# Patient Record
Sex: MALE | Race: WHITE | NOT HISPANIC OR LATINO | Employment: FULL TIME | URBAN - METROPOLITAN AREA
[De-identification: names, ages, dates, MRNs, and addresses within clinical notes are randomized per-mention and may not be internally consistent; named-entity substitution may affect disease eponyms.]

---

## 2017-01-23 RX ORDER — PRAVASTATIN SODIUM 80 MG/1
TABLET ORAL
Qty: 90 TABLET | Refills: 0 | Status: SHIPPED | OUTPATIENT
Start: 2017-01-23 | End: 2017-06-02 | Stop reason: SDUPTHER

## 2017-01-23 RX ORDER — PRAVASTATIN SODIUM 80 MG/1
80 TABLET ORAL DAILY
Qty: 90 TABLET | Refills: 0 | Status: SHIPPED | OUTPATIENT
Start: 2017-01-23 | End: 2017-01-23 | Stop reason: SDUPTHER

## 2017-01-23 RX ORDER — PRAVASTATIN SODIUM 80 MG/1
80 TABLET ORAL DAILY
Qty: 90 TABLET | Refills: 0 | Status: SHIPPED | OUTPATIENT
Start: 2017-01-23 | End: 2018-06-07 | Stop reason: SDUPTHER

## 2017-04-10 ENCOUNTER — PATIENT MESSAGE (OUTPATIENT)
Dept: FAMILY MEDICINE | Facility: CLINIC | Age: 62
End: 2017-04-10

## 2017-04-11 ENCOUNTER — PATIENT MESSAGE (OUTPATIENT)
Dept: FAMILY MEDICINE | Facility: CLINIC | Age: 62
End: 2017-04-11

## 2017-04-11 DIAGNOSIS — R07.9 CHEST PAIN, UNSPECIFIED TYPE: Primary | ICD-10-CM

## 2017-04-11 DIAGNOSIS — R53.1 GENERALIZED WEAKNESS: ICD-10-CM

## 2017-04-11 DIAGNOSIS — R42 DIZZINESS: ICD-10-CM

## 2017-04-11 NOTE — TELEPHONE ENCOUNTER
----- Message from German Bull sent at 4/11/2017 11:10 AM CDT -----  Contact: Self  Pt called regarding [facility] messages. Pt states he needs a response ASAP. Pt can be reached @  [phone]

## 2017-04-11 NOTE — TELEPHONE ENCOUNTER
Discussed at length on the phone with patient.  He does admit to taking 1 puff of marijuana but no one else smoking it had similar symptoms and he got it from a reputable friend.  Is not something he typically does.  He felt profoundly weak for about 6 hours.  Apparently the workup in the emergency room was negative.  The did do a urine.  He was not told there was anything unusual found.  No particular explanation could be given.  His description sounds very much like acute vertigo.  All this happened recently any exercise yesterday without any problems and exercise today without problems.  He was not told his blood pressure was low in the emergency room.  He was generally profoundly weak.    We discussed that this is likely an episode of vertigo and could be recurrent    The also reviewed his family history of heart disease, his low HDL, prior abnormal testing and he did have an angiogram that was normal but that was 2003.  We will repeat his cardiac testing under the circumstances and have him come in for a physical.

## 2017-04-24 ENCOUNTER — HOSPITAL ENCOUNTER (OUTPATIENT)
Dept: RADIOLOGY | Facility: HOSPITAL | Age: 62
Discharge: HOME | End: 2017-04-24
Attending: INTERNAL MEDICINE
Payer: COMMERCIAL

## 2017-04-24 ENCOUNTER — PATIENT MESSAGE (OUTPATIENT)
Dept: FAMILY MEDICINE | Facility: CLINIC | Age: 62
End: 2017-04-24

## 2017-04-24 ENCOUNTER — HOSPITAL ENCOUNTER (OUTPATIENT)
Dept: CARDIOLOGY | Facility: HOSPITAL | Age: 62
Discharge: HOME | End: 2017-04-24
Attending: INTERNAL MEDICINE
Payer: COMMERCIAL

## 2017-04-24 DIAGNOSIS — R42 DIZZINESS: ICD-10-CM

## 2017-04-24 DIAGNOSIS — R53.1 GENERALIZED WEAKNESS: ICD-10-CM

## 2017-04-24 DIAGNOSIS — R07.9 CHEST PAIN, UNSPECIFIED TYPE: ICD-10-CM

## 2017-04-24 LAB — DIASTOLIC DYSFUNCTION: NO

## 2017-04-24 PROCEDURE — 78452 HT MUSCLE IMAGE SPECT MULT: CPT | Mod: TC

## 2017-04-24 PROCEDURE — 93017 CV STRESS TEST TRACING ONLY: CPT

## 2017-04-24 PROCEDURE — 78452 HT MUSCLE IMAGE SPECT MULT: CPT | Mod: 26,,, | Performed by: INTERNAL MEDICINE

## 2017-04-24 PROCEDURE — 93016 CV STRESS TEST SUPVJ ONLY: CPT | Mod: ,,, | Performed by: INTERNAL MEDICINE

## 2017-04-24 PROCEDURE — 93018 CV STRESS TEST I&R ONLY: CPT | Mod: ,,, | Performed by: INTERNAL MEDICINE

## 2017-06-02 ENCOUNTER — OFFICE VISIT (OUTPATIENT)
Dept: FAMILY MEDICINE | Facility: CLINIC | Age: 62
End: 2017-06-02
Payer: COMMERCIAL

## 2017-06-02 VITALS
TEMPERATURE: 98 F | BODY MASS INDEX: 25.44 KG/M2 | DIASTOLIC BLOOD PRESSURE: 78 MMHG | SYSTOLIC BLOOD PRESSURE: 130 MMHG | HEART RATE: 89 BPM | OXYGEN SATURATION: 95 % | WEIGHT: 177.69 LBS | HEIGHT: 70 IN

## 2017-06-02 DIAGNOSIS — R09.82 POST-NASAL DRIP: Primary | ICD-10-CM

## 2017-06-02 PROCEDURE — 99213 OFFICE O/P EST LOW 20 MIN: CPT | Mod: S$GLB,,, | Performed by: NURSE PRACTITIONER

## 2017-06-02 PROCEDURE — 99999 PR PBB SHADOW E&M-EST. PATIENT-LVL III: CPT | Mod: PBBFAC,,, | Performed by: NURSE PRACTITIONER

## 2017-06-02 NOTE — PATIENT INSTRUCTIONS
Claritin(loratadine), Allegra(fexofenadine), or zyrtec(cetirizine) for post nasal drip, runny nose, and congestion.   Delsym for cough.

## 2017-06-02 NOTE — PROGRESS NOTES
This dictation has been generated using Dragon Dictation some phonetic errors may occur.     Dariusz was seen today for sinus problem and cough.    Diagnoses and all orders for this visit:    Post-nasal drip      Patient Instructions   Claritin(loratadine), Allegra(fexofenadine), or zyrtec(cetirizine) for post nasal drip, runny nose, and congestion.   Delsym for cough.       Return if symptoms worsen or fail to improve.      ________________________________________________________________  ________________________________________________________________        Chief Complaint   Patient presents with    Sinus Problem    Cough     History of present illness  This 61 y.o. presents today for complaint of postnasal drip and cough.  Cough isn't present for a couple weeks.  Chest is become somewhat uncomfortable.  Postnasal drip is worse at night.  Recently had cardiac workup which was unremarkable.  Does 30 minutes on the elliptical.  He tried Delsym for the cough which was helpful.  Review of systems  No fever or chills  No facial pain or pressure  Some tightness in the chest no shortness of breath no dyspnea on exertion  No nausea vomiting.  Patient did have an episode of diarrhea.  Past medical and social history reviewed.  Patient due to me.  Follows with normal partners.    Past Medical History:   Diagnosis Date    Decreased libido 11/11/2013    Dermatitis 10/12/2012    Hyperlipidemia 11/11/2013       History reviewed. No pertinent surgical history.    History reviewed. No pertinent family history.    Social History     Social History    Marital status:      Spouse name: N/A    Number of children: N/A    Years of education: N/A     Social History Main Topics    Smoking status: Never Smoker    Smokeless tobacco: None    Alcohol use None    Drug use: Unknown    Sexual activity: Not Asked     Other Topics Concern    None     Social History Narrative    None       Current Outpatient Prescriptions    Medication Sig Dispense Refill    aspirin 325 MG tablet Take 325 mg by mouth.  Tablet Oral Every day      pravastatin (PRAVACHOL) 80 MG tablet Take 1 tablet (80 mg total) by mouth once daily. 90 tablet 0     No current facility-administered medications for this visit.        Review of patient's allergies indicates:  No Known Allergies    Physical examination  Vitals Reviewed  Gen. Well-dressed well-nourished no apparent distress  Skin warm dry and intact.  No rashes noted.  HEENT.  TM intact bilateral with normal light reflex.  No mastoid tenderness during percussion.  Nares patent bilateral.  Pharynx is unremarkable except postnasal drip.  No maxillary or frontal sinus tenderness when percussed.    Neck is supple without adenopathy  Chest.  Respirations are even unlabored.  Lungs are clear to auscultation.  Cardiac regular rate and rhythm.  No chest wall adenopathy noted.  Neuro. Awake alert oriented x4.  Normal judgment and cognition noted.  Extremities no clubbing cyanosis or edema noted.     Call or return to clinic prn if these symptoms worsen or fail to improve as anticipated.

## 2017-06-10 ENCOUNTER — PATIENT MESSAGE (OUTPATIENT)
Dept: FAMILY MEDICINE | Facility: CLINIC | Age: 62
End: 2017-06-10

## 2017-06-10 ENCOUNTER — OFFICE VISIT (OUTPATIENT)
Dept: FAMILY MEDICINE | Facility: CLINIC | Age: 62
End: 2017-06-10
Payer: COMMERCIAL

## 2017-06-10 ENCOUNTER — HOSPITAL ENCOUNTER (OUTPATIENT)
Dept: RADIOLOGY | Facility: HOSPITAL | Age: 62
Discharge: HOME | End: 2017-06-10
Attending: FAMILY MEDICINE
Payer: COMMERCIAL

## 2017-06-10 VITALS
RESPIRATION RATE: 18 BRPM | OXYGEN SATURATION: 94 % | HEIGHT: 70 IN | SYSTOLIC BLOOD PRESSURE: 120 MMHG | DIASTOLIC BLOOD PRESSURE: 70 MMHG | BODY MASS INDEX: 24.79 KG/M2 | HEART RATE: 107 BPM | WEIGHT: 173.19 LBS | TEMPERATURE: 98 F

## 2017-06-10 DIAGNOSIS — J18.9 ATYPICAL PNEUMONIA: ICD-10-CM

## 2017-06-10 DIAGNOSIS — R93.89 ABNORMAL CXR: Primary | ICD-10-CM

## 2017-06-10 DIAGNOSIS — J18.9 ATYPICAL PNEUMONIA: Primary | ICD-10-CM

## 2017-06-10 DIAGNOSIS — R06.00 DYSPNEA, UNSPECIFIED TYPE: ICD-10-CM

## 2017-06-10 PROCEDURE — 71020 XR CHEST PA AND LATERAL: CPT | Mod: 26,,, | Performed by: RADIOLOGY

## 2017-06-10 PROCEDURE — 99999 PR PBB SHADOW E&M-EST. PATIENT-LVL III: CPT | Mod: PBBFAC,,,

## 2017-06-10 PROCEDURE — 71020 XR CHEST PA AND LATERAL: CPT | Mod: TC,PO

## 2017-06-10 PROCEDURE — 99214 OFFICE O/P EST MOD 30 MIN: CPT | Mod: S$GLB,,, | Performed by: FAMILY MEDICINE

## 2017-06-10 RX ORDER — ASPIRIN 81 MG/1
81 TABLET ORAL EVERY MORNING
COMMUNITY
End: 2017-09-15

## 2017-06-10 RX ORDER — AZITHROMYCIN 250 MG/1
TABLET, FILM COATED ORAL
Qty: 6 TABLET | Refills: 0 | Status: SHIPPED | OUTPATIENT
Start: 2017-06-10 | End: 2017-06-14 | Stop reason: ALTCHOICE

## 2017-06-10 NOTE — PROGRESS NOTES
Routine Office Visit    Patient Name: Dariusz Urbina    : 1955  MRN: [id number]    Subjective:  Dariusz is a 62 y.o. male who presents today for:    1. Fatigue, cough, and shortness of breath  Patient presenting with 2 weeks of feeling fatigued, productive cough, and shortness of breath on exertion.  He states that he had a temperature of 101F today.  There has been some congestion.  No chills, sweats, n/v/d.  No abdominal pain.  He states that there is pain with deep inspiration.      Past Medical History  Past Medical History:   Diagnosis Date    Decreased libido 2013    Dermatitis 10/12/2012    Hyperlipidemia 2013       Past Surgical History  History reviewed. No pertinent surgical history.    Family History  History reviewed. No pertinent family history.    Social History  Social History     Social History    Marital status:      Spouse name: N/A    Number of children: N/A    Years of education: N/A     Occupational History    Not on file.     Social History Main Topics    Smoking status: Former Smoker    Smokeless tobacco: Not on file    Alcohol use Not on file    Drug use: Unknown    Sexual activity: Not on file     Other Topics Concern    Not on file     Social History Narrative    No narrative on file       Current Medications  Current Outpatient Prescriptions on File Prior to Visit   Medication Sig Dispense Refill    pravastatin (PRAVACHOL) 80 MG tablet Take 1 tablet (80 mg total) by mouth once daily. 90 tablet 0    [DISCONTINUED] aspirin 325 MG tablet Take 325 mg by mouth.  Tablet Oral Every day       No current facility-administered medications on file prior to visit.        Allergies   Review of patient's allergies indicates:  No Known Allergies    Review of Systems (Pertinent positives)  Review of Systems   Constitutional: Positive for fever and malaise/fatigue.   HENT: Positive for congestion.    Eyes: Negative.    Respiratory: Positive for cough, sputum production  "and shortness of breath. Negative for hemoptysis and wheezing.    Cardiovascular: Negative.    Gastrointestinal: Negative.    Skin: Negative.          /70 (BP Location: Right arm, Patient Position: Sitting, BP Method: Manual)   Pulse 107   Temp 98.4 °F (36.9 °C) (Oral) Comment: ibuprofen 2 tablets about 25 minutes ago  Resp 18   Ht 5' 10" (1.778 m)   Wt 78.5 kg (173 lb 2.7 oz)   SpO2 (!) 94%   BMI 24.85 kg/m²     GENERAL APPEARANCE: in no apparent distress and well developed and well nourished  HEENT: PERRL, EOMI, Sclera clear, anicteric, Oropharynx clear, no lesions, Neck supple with midline trachea  NECK: normal, supple, no adenopathy, thyroid normal in size  RESPIRATORY: appears well, vitals normal, no respiratory distress, acyanotic, normal RR, chest clear, no wheezing, crepitations, rhonchi, normal symmetric air entry  HEART: regular rate and rhythm, S1, S2 normal, no murmur, click, rub or gallop.    ABDOMEN: abdomen is soft without tenderness, no masses, no hernias, no organomegaly, no rebound, no guarding. Suprapubic tenderness absent. No CVA tenderness.  SKIN: no rashes, no wounds, no other lesions  PSYCH: Alert, oriented x 3, thought content appropriate, speech normal, pleasant and cooperative, good eye contact, well groomed    Assessment/Plan:  Dariusz Urbina is a 62 y.o. male who presents today for :    Dariusz was seen today for cough and fever.    Diagnoses and all orders for this visit:    Atypical pneumonia  -     azithromycin (Z-LUIS) 250 MG tablet; Take 2 tablets by mouth on day 1; Take 1 tablet by mouth on days 2-5  -     X-Ray Chest PA And Lateral; Future    1.  cxr to be done today  2.  zpak as prescribed  3.  He is to call if no improvement by Monday and will get blood work  4.  Follow up as needed    Carlos Jacinto MD      "

## 2017-06-12 ENCOUNTER — HOSPITAL ENCOUNTER (OUTPATIENT)
Dept: RADIOLOGY | Facility: HOSPITAL | Age: 62
Discharge: HOME | End: 2017-06-12
Attending: FAMILY MEDICINE
Payer: COMMERCIAL

## 2017-06-12 DIAGNOSIS — R91.8 ABNORMAL CT SCAN, LUNG: ICD-10-CM

## 2017-06-12 DIAGNOSIS — R93.89 ABNORMAL CXR: ICD-10-CM

## 2017-06-12 DIAGNOSIS — R91.8 PULMONARY NODULES/LESIONS, MULTIPLE: Primary | ICD-10-CM

## 2017-06-12 DIAGNOSIS — R06.00 DYSPNEA, UNSPECIFIED TYPE: ICD-10-CM

## 2017-06-12 PROCEDURE — 71250 CT THORAX DX C-: CPT | Mod: TC

## 2017-06-12 PROCEDURE — 71250 CT THORAX DX C-: CPT | Mod: 26,,, | Performed by: RADIOLOGY

## 2017-06-12 NOTE — PROGRESS NOTES
Patient called and notified of CT results [facility] 6/12/17 at 1745.  Patient informed that abnormal chest CT was concerning for metastatic disease.  He stated that he continues to have fevers and sweats.  He does occasionally get short of breath with exertion, but not at rest.  No night sweats, bruising, or changes in bowel habits.  He is up to date on colonoscopies and reports they have always been normal.  No testicular pain.  Will place referral to pulmonary for evaluation.  Patient told to go to the ED should his current symptoms worsen.    Carlos Jacinto MD

## 2017-06-13 ENCOUNTER — PATIENT MESSAGE (OUTPATIENT)
Dept: FAMILY MEDICINE | Facility: CLINIC | Age: 62
End: 2017-06-13

## 2017-06-13 ENCOUNTER — TELEPHONE (OUTPATIENT)
Dept: FAMILY MEDICINE | Facility: CLINIC | Age: 62
End: 2017-06-13

## 2017-06-13 NOTE — TELEPHONE ENCOUNTER
----- Message from Mari Mohamud sent at 6/13/2017  7:57 AM CDT -----  Patient is requesting a return call regarding a pulmonary referral. Please call at [phone] Thank you!

## 2017-06-13 NOTE — TELEPHONE ENCOUNTER
Patient already has a referral in for pulmonary so please call the referral coordinators and have them work on that ASAP, ANY available pulmonary    If necessary, ask the referral coordinators to call the pulmonary department and have them review the recent CTs of that they can triage an appropriate appointment since he does need to be seen soon

## 2017-06-14 ENCOUNTER — OFFICE VISIT (OUTPATIENT)
Dept: PULMONOLOGY | Facility: CLINIC | Age: 62
End: 2017-06-14
Payer: COMMERCIAL

## 2017-06-14 VITALS
OXYGEN SATURATION: 96 % | DIASTOLIC BLOOD PRESSURE: 82 MMHG | HEIGHT: 70 IN | SYSTOLIC BLOOD PRESSURE: 142 MMHG | HEART RATE: 95 BPM | WEIGHT: 172.5 LBS | BODY MASS INDEX: 24.69 KG/M2

## 2017-06-14 DIAGNOSIS — R91.8 LUNG MASS: Primary | ICD-10-CM

## 2017-06-14 PROCEDURE — 99204 OFFICE O/P NEW MOD 45 MIN: CPT | Mod: S$GLB,,, | Performed by: INTERNAL MEDICINE

## 2017-06-14 PROCEDURE — 99999 PR PBB SHADOW E&M-EST. PATIENT-LVL III: CPT | Mod: PBBFAC,,, | Performed by: INTERNAL MEDICINE

## 2017-06-14 NOTE — PROGRESS NOTES
Subjective:       Patient ID: Dariusz Urbina is a 62 y.o. male.    Chief Complaint: Abnormal Ct Scan    HPI   Dariusz Urbina 62 y.o. male    has a past medical history of Decreased libido (2013); Dermatitis (10/12/2012); and Hyperlipidemia (2013).    has no past surgical history on file.   reports that he has quit smoking. He does not have any smokeless tobacco history on file.  Referred by: Dr. Carlos Jacinto  Who had concerns including Abnormal Ct Scan.  The patient's last visit with me was on Visit date not found.    No chills, ns, wt changes, nausea, vomiting, diarrhea, constipation, chest pain, tightness, pressure  Doing well until 3 weeks ago, developed low grade fever, malaise, fatigue  Thought to be allergies, took mucinex  Returned to pcp  Cxr done with multiple masses  Ct done and referred here  Very distant tobacco history  Sister  20 years ago of lung cancer  Cardiac ct 1.5 years  Ago  Negative except 4mm lll   No rashes, nodes,     Review of Systems   All other systems reviewed and are negative.      Objective:      Physical Exam   Constitutional: He is oriented to person, place, and time. He appears well-developed and well-nourished. He appears not cachectic. No distress.   HENT:   Head: Normocephalic.   Nose: Nose normal. No mucosal edema.   Mouth/Throat: Oropharynx is clear and moist. Normal dentition. No oropharyngeal exudate.   Neck: Normal range of motion. Neck supple.   Cardiovascular: Normal rate, regular rhythm, normal heart sounds and intact distal pulses.  Exam reveals no gallop and no friction rub.    No murmur heard.  Pulmonary/Chest: Effort normal and breath sounds normal. No stridor.   Abdominal: Soft. Bowel sounds are normal. He exhibits no distension.   Musculoskeletal: Normal range of motion. He exhibits no edema or tenderness.   Lymphadenopathy: No supraclavicular adenopathy is present.     He has no cervical adenopathy.   Neurological: He is alert and oriented to  person, place, and time. Gait normal.   Skin: Skin is warm and dry. No rash noted. He is not diaphoretic. No cyanosis or erythema. No pallor. Nails show no clubbing.   Psychiatric: He has a normal mood and affect. His behavior is normal. Judgment and thought content normal.   Nursing note and vitals reviewed.    Personal Diagnostic Review    Pulmonary Function Tests 6/14/2017   SpO2 96   Height 70.000   Weight 2760   BMI (Calculated) 24.8         Assessment:       1. Lung mass        Outpatient Encounter Prescriptions as of 6/14/2017   Medication Sig Dispense Refill    pravastatin (PRAVACHOL) 80 MG tablet Take 1 tablet (80 mg total) by mouth once daily. 90 tablet 0    aspirin (ECOTRIN) 81 MG EC tablet Take 81 mg by mouth once daily.      [DISCONTINUED] azithromycin (Z-LUIS) 250 MG tablet Take 2 tablets by mouth on day 1; Take 1 tablet by mouth on days 2-5 6 tablet 0     No facility-administered encounter medications on file as of 6/14/2017.      No orders of the defined types were placed in this encounter.    Plan:             I personally reviewed the      1. CXR   2. CXR report   3. CT chest   4. CT chest report     Assessment:  Dariusz was seen today for abnormal ct scan.    Diagnoses and all orders for this visit:    Lung mass  -     NM PET CT Routine Skull to Mid Thigh; Future  -     CBC auto differential; Future  -     Comprehensive metabolic panel; Future  -     IR Biopsy Lung; Future        Plan:  Multiple lung masses, concerning for metastatic disease  IR biopsy- discussed with Dr Desir, message left for Marisol [phone]  Labs today  Pet scan ordered  Call patient with results  Advised to call back directly if there are further questions, or if these symptoms fail to improve as anticipated or worsen.    No Follow-up on file.    There are no Patient Instructions on file for this visit.    Immunization History   Administered Date(s) Administered    influenza - Quadrivalent 11/24/2015

## 2017-06-14 NOTE — LETTER
June 14, 2017      Carlos Jacinto MD  [address] [place] [address]           [facility]  [address]  [place] [address]  Phone: [phone]          Patient: Dariusz Urbina   MR Number: [id number]   YOB: 1955   Date of Visit: 6/14/2017       Dear Dr. Carlos ANDRADE Page:    Thank you for referring Dariusz Urbina to me for evaluation. Attached you will find relevant portions of my assessment and plan of care.    If you have questions, please do not hesitate to call me. I look forward to following Dariusz Urbina along with you.    Sincerely,    Rene Yao MD    Enclosure  CC:  No Recipients    If you would like to receive this communication electronically, please contact [email or web address] or [phone] to request more information on [facility] access.    For providers and/or their staff who would like to refer a patient to [facility], please contact us through our one-stop-shop provider referral line, [facility], at [phone].    If you feel you have received this communication in error or would no longer like to receive these types of communications, please e-mail [email or web address]

## 2017-06-16 ENCOUNTER — HOSPITAL ENCOUNTER (OUTPATIENT)
Facility: HOSPITAL | Age: 62
Discharge: HOME | End: 2017-06-16
Attending: INTERNAL MEDICINE | Admitting: INTERNAL MEDICINE
Payer: COMMERCIAL

## 2017-06-16 ENCOUNTER — PATIENT MESSAGE (OUTPATIENT)
Dept: PULMONOLOGY | Facility: CLINIC | Age: 62
End: 2017-06-16

## 2017-06-16 VITALS
HEART RATE: 88 BPM | RESPIRATION RATE: 15 BRPM | OXYGEN SATURATION: 95 % | TEMPERATURE: 99 F | DIASTOLIC BLOOD PRESSURE: 60 MMHG | SYSTOLIC BLOOD PRESSURE: 127 MMHG

## 2017-06-16 DIAGNOSIS — R91.8 LUNG MASS: ICD-10-CM

## 2017-06-16 LAB
INR PPP: 1
PROTHROMBIN TIME: 10.8 SEC

## 2017-06-16 PROCEDURE — 25000003 PHARM REV CODE 250: Performed by: RADIOLOGY

## 2017-06-16 PROCEDURE — 88313 SPECIAL STAINS GROUP 2: CPT | Mod: 26,,, | Performed by: PATHOLOGY

## 2017-06-16 PROCEDURE — 88305 TISSUE EXAM BY PATHOLOGIST: CPT | Mod: 26,,, | Performed by: PATHOLOGY

## 2017-06-16 PROCEDURE — 85610 PROTHROMBIN TIME: CPT

## 2017-06-16 PROCEDURE — 88312 SPECIAL STAINS GROUP 1: CPT | Mod: 26,,, | Performed by: PATHOLOGY

## 2017-06-16 PROCEDURE — 88305 TISSUE EXAM BY PATHOLOGIST: CPT | Performed by: PATHOLOGY

## 2017-06-16 PROCEDURE — 88333 PATH CONSLTJ SURG CYTO XM 1: CPT | Mod: 26,,, | Performed by: PATHOLOGY

## 2017-06-16 PROCEDURE — 63600175 PHARM REV CODE 636 W HCPCS: Performed by: RADIOLOGY

## 2017-06-16 RX ORDER — MIDAZOLAM HYDROCHLORIDE 1 MG/ML
INJECTION, SOLUTION INTRAMUSCULAR; INTRAVENOUS CODE/TRAUMA/SEDATION MEDICATION
Status: COMPLETED | OUTPATIENT
Start: 2017-06-16 | End: 2017-06-16

## 2017-06-16 RX ORDER — MIDAZOLAM HYDROCHLORIDE 1 MG/ML
1 INJECTION INTRAMUSCULAR; INTRAVENOUS
Status: DISCONTINUED | OUTPATIENT
Start: 2017-06-16 | End: 2017-06-16 | Stop reason: HOSPADM

## 2017-06-16 RX ORDER — SODIUM CHLORIDE 9 MG/ML
500 INJECTION, SOLUTION INTRAVENOUS ONCE
Status: DISCONTINUED | OUTPATIENT
Start: 2017-06-16 | End: 2017-06-16 | Stop reason: HOSPADM

## 2017-06-16 RX ORDER — FENTANYL CITRATE 50 UG/ML
50 INJECTION, SOLUTION INTRAMUSCULAR; INTRAVENOUS
Status: DISCONTINUED | OUTPATIENT
Start: 2017-06-16 | End: 2017-06-16 | Stop reason: HOSPADM

## 2017-06-16 RX ORDER — ACETAMINOPHEN 500 MG
500 TABLET ORAL ONCE
Status: COMPLETED | OUTPATIENT
Start: 2017-06-16 | End: 2017-06-16

## 2017-06-16 RX ORDER — AZITHROMYCIN 250 MG/1
TABLET, FILM COATED ORAL
Qty: 6 TABLET | Refills: 0 | Status: SHIPPED | OUTPATIENT
Start: 2017-06-16 | End: 2017-06-21

## 2017-06-16 RX ORDER — FENTANYL CITRATE 50 UG/ML
INJECTION, SOLUTION INTRAMUSCULAR; INTRAVENOUS CODE/TRAUMA/SEDATION MEDICATION
Status: COMPLETED | OUTPATIENT
Start: 2017-06-16 | End: 2017-06-16

## 2017-06-16 RX ADMIN — MIDAZOLAM HYDROCHLORIDE 1 MG: 1 INJECTION, SOLUTION INTRAMUSCULAR; INTRAVENOUS at 08:06

## 2017-06-16 RX ADMIN — ACETAMINOPHEN 500 MG: 500 TABLET ORAL at 10:06

## 2017-06-16 RX ADMIN — FENTANYL CITRATE 50 MCG: 50 INJECTION, SOLUTION INTRAMUSCULAR; INTRAVENOUS at 08:06

## 2017-06-16 NOTE — PROCEDURES
Radiology Post-Procedure Note    Pre Op Diagnosis: Left lung mass    Post Op Diagnosis: Left lung mass    Procedure: left lung biopsy    Procedure performed by: Omaira Scott and Mian Aleman    Written Informed Consent Obtained: Yes    Specimen Removed: YES 3 core specimen    Estimated Blood Loss: Minimal    Findings:   Using CT guidance a 19g sheath needle was placed into a Left lung mass. 20g monopty biopsy gun used to take 3 core biopsy samples. Specimen sent to pathology.     Additional specimen sent to lab: No    Patient tolerated procedure well.    Omaira Scott MD  PGY-3  Department of Radiology  Pager: [phone]

## 2017-06-16 NOTE — PROGRESS NOTES
Per Dr. Scott, CXR are good to proceed with discharge. Recovery completed, pt tolerated well. No apparent distress noted. Dressing CDI. Discharge instructions reviewed and acknowledged. Pt discharged via wheelchair and private vehicle, accompanied by family.

## 2017-06-16 NOTE — PROGRESS NOTES
Pt arrive to IR dept for Lung Bx. Pre--Op assessment is in progress at this time. Awaiting lab results and consent at this time

## 2017-06-16 NOTE — DISCHARGE SUMMARY
Radiology Discharge Summary      Hospital Course: No complications    Admit Date: 6/16/2017  Discharge Date: 06/16/2017     Instructions Given to Patient: Yes  Diet: Resume prior diet  Activity: activity as tolerated and no driving for today    Description of Condition on Discharge: Stable  Vital Signs (Most Recent): Temp: 98.6 °F (37 °C) (06/16/17 0930)  Pulse: 88 (06/16/17 1230)  Resp: 15 (06/16/17 1230)  BP: 127/60 (06/16/17 1230)  SpO2: 95 % (06/16/17 1230)    Discharge Disposition: Home    Discharge Diagnosis: Lung mass s/p CT-guided lung biopsy. Follow up with referring physician.    Omaira Scott MD  PGY-3  Department of Radiology  Pager: [phone]

## 2017-06-16 NOTE — H&P
Radiology History & Physical      SUBJECTIVE:     Chief Complaint: Lung mass    History of Present Illness:  Dariusz Urbina is a 62 y.o. male who presents for CT-guided lung biopsy.  Past Medical History:   Diagnosis Date    Decreased libido 11/11/2013    Dermatitis 10/12/2012    Hyperlipidemia 11/11/2013     History reviewed. No pertinent surgical history.    Home Meds:   Prior to Admission medications    Medication Sig Start Date End Date Taking? Authorizing Provider   aspirin (ECOTRIN) 81 MG EC tablet Take 81 mg by mouth once daily.   Yes Historical Provider, MD   pravastatin (PRAVACHOL) 80 MG tablet Take 1 tablet (80 mg total) by mouth once daily. 1/23/17  Yes Sarika Rodriguez, IRVINGP-C     Anticoagulants/Antiplatelets: no anticoagulation    Allergies: Review of patient's allergies indicates:  No Known Allergies  Sedation History:  no adverse reactions    Review of Systems:   Hematological: no known coagulopathies  Respiratory: no shortness of breath  Cardiovascular: no chest pain  Gastrointestinal: no abdominal pain  Genito-Urinary: no dysuria  Musculoskeletal: negative  Neurological: no TIA or stroke symptoms         OBJECTIVE:     Vital Signs (Most Recent)  Pulse: 85 (06/16/17 0722)  Resp: 18 (06/16/17 0722)  BP: (!) 149/73 (06/16/17 0722)  SpO2: 98 % (06/16/17 0722)    Physical Exam:  ASA: 2  Mallampati: 3    General: no acute distress  Mental Status: alert and oriented to person, place and time  HEENT: normocephalic, atraumatic  Chest: unlabored breathing  Heart: regular heart rate  Abdomen: nondistended  Extremity: moves all extremities    Laboratory  No results found for: INR    Lab Results   Component Value Date    WBC 7.64 06/14/2017    HGB 14.8 06/14/2017    HCT 43.4 06/14/2017    MCV 83 06/14/2017     06/14/2017      Lab Results   Component Value Date     (H) 06/14/2017     06/14/2017    K 4.5 06/14/2017     06/14/2017    CO2 26 06/14/2017    BUN 10 06/14/2017    CREATININE  1.0 06/14/2017    CALCIUM 9.4 06/14/2017    ALT 29 06/14/2017    AST 24 06/14/2017    ALBUMIN 3.2 (L) 06/14/2017    BILITOT 0.6 06/14/2017       ASSESSMENT/PLAN:     Sedation Plan: moderate  Patient will undergo CT-guided lung biopsy.    Omaira Scott MD  PGY-3  Department of Radiology  Pager: [phone]

## 2017-06-19 ENCOUNTER — HOSPITAL ENCOUNTER (OUTPATIENT)
Dept: RADIOLOGY | Facility: HOSPITAL | Age: 62
Discharge: HOME | End: 2017-06-19
Attending: INTERNAL MEDICINE
Payer: COMMERCIAL

## 2017-06-19 DIAGNOSIS — R91.8 LUNG MASS: ICD-10-CM

## 2017-06-19 PROCEDURE — 78815 PET IMAGE W/CT SKULL-THIGH: CPT | Mod: 26,PI,, | Performed by: RADIOLOGY

## 2017-06-19 PROCEDURE — A9552 F18 FDG: HCPCS

## 2017-06-19 PROCEDURE — 78815 PET IMAGE W/CT SKULL-THIGH: CPT | Mod: TC

## 2017-06-20 ENCOUNTER — TELEPHONE (OUTPATIENT)
Dept: CRITICAL CARE MEDICINE | Facility: HOSPITAL | Age: 62
End: 2017-06-20

## 2017-06-21 ENCOUNTER — LAB VISIT (OUTPATIENT)
Dept: LAB | Facility: HOSPITAL | Age: 62
End: 2017-06-21
Payer: COMMERCIAL

## 2017-06-21 ENCOUNTER — OFFICE VISIT (OUTPATIENT)
Dept: INFECTIOUS DISEASES | Facility: CLINIC | Age: 62
End: 2017-06-21
Payer: COMMERCIAL

## 2017-06-21 DIAGNOSIS — R91.8 LUNG MASS: ICD-10-CM

## 2017-06-21 DIAGNOSIS — R91.8 LUNG MASS: Primary | ICD-10-CM

## 2017-06-21 PROCEDURE — 87385 HISTOPLASMA CAPSUL AG IA: CPT | Mod: 91

## 2017-06-21 PROCEDURE — 86682 HELMINTH ANTIBODY: CPT

## 2017-06-21 PROCEDURE — 86635 COCCIDIOIDES ANTIBODY: CPT | Mod: 91

## 2017-06-21 PROCEDURE — 99205 OFFICE O/P NEW HI 60 MIN: CPT | Mod: S$GLB,,, | Performed by: INTERNAL MEDICINE

## 2017-06-21 PROCEDURE — 99999 PR PBB SHADOW E&M-EST. PATIENT-LVL II: CPT | Mod: PBBFAC,,, | Performed by: INTERNAL MEDICINE

## 2017-06-21 PROCEDURE — 87385 HISTOPLASMA CAPSUL AG IA: CPT

## 2017-06-21 PROCEDURE — 36415 COLL VENOUS BLD VENIPUNCTURE: CPT

## 2017-06-21 PROCEDURE — 86703 HIV-1/HIV-2 1 RESULT ANTBDY: CPT

## 2017-06-21 PROCEDURE — 86403 PARTICLE AGGLUT ANTBDY SCRN: CPT

## 2017-06-21 PROCEDURE — 86361 T CELL ABSOLUTE COUNT: CPT

## 2017-06-21 NOTE — PROGRESS NOTES
Subjective:      Patient ID: Dariusz Urbina is a 62 y.o. male.    Chief Complaint:Fever    History of Present Illness    63 y/o male comes to office for evaluation of fever that started about 3 weeks ago. He reports daily fever up to 101F, he has been taking ibuprofen 2-3 times daily with improvement of temperature. Patient also has noticed progressive shortness of breath, more to exercise. He used to perform daily aerobic exercise but now he has noticed decreased in tolerance due to shortness of breath. He also reports generalized weakness and night sweats.    He has been working in [place] for 33 years. Report that his wife had a cough for a year, had extensive work up but no diagnosis was ever done, recently cough improved, no sick contact or any person with similar symptoms. About 3 years ago he visited [facility], denies any visit to [place], last September (2016) he was in [place] for about 9 days (activities included riding motorcycle, no outdoor/rural activities), also was in [place] visiting his daughter (also riding motorcycles and no outdoor exposure); no travel outside [place]. No obvious animal exposure. Patient is a former smoker, a little less than a pack per day for 20 years, he stopped 25 years ago.    Chest CT 6/10/17: Numerous variable size scattered small rounded opacities in the lungs bilaterally which correspond to opacities seen on chest x-ray primarily concerning for metastatic disease.    Pet Scan 6/14/17: Innumerable hypermetabolic masses throughout both lungs concerning for malignancy. No scintigraphic evidence of extrathoracic malignancy.    Patient had IR left lung mass biopsy on 6/14/17 showing: Fibrosis and granuloma with focal necrosis. Special stain GMS is positive for fungal yeast forms. AFB stain is negative. Negative for malignancy.    Review of Systems   Constitution: Positive for fever, weakness and night sweats. Negative for chills, decreased appetite, malaise/fatigue,  weight gain and weight loss.   HENT: Positive for congestion. Negative for ear pain, headaches, hearing loss, hoarse voice, sore throat and tinnitus.    Eyes: Negative for blurred vision, redness and visual disturbance.   Cardiovascular: Negative for chest pain, leg swelling and palpitations.   Respiratory: Positive for cough and sputum production. Negative for hemoptysis and shortness of breath.    Hematologic/Lymphatic: Negative for adenopathy. Does not bruise/bleed easily.   Skin: Negative for dry skin, itching, rash and suspicious lesions.   Musculoskeletal: Negative for back pain, joint pain, myalgias and neck pain.   Gastrointestinal: Negative for abdominal pain, constipation, diarrhea, heartburn, nausea and vomiting.   Genitourinary: Negative for dysuria, flank pain, frequency, hematuria, hesitancy and urgency.   Neurological: Negative for dizziness, numbness and paresthesias.   Psychiatric/Behavioral: Negative for depression and memory loss. The patient does not have insomnia and is not nervous/anxious.      Objective:   Physical Exam   Constitutional: He is oriented to person, place, and time. No distress.   HENT:   Head: Normocephalic and atraumatic.   Mouth/Throat: No oropharyngeal exudate.   Eyes: No scleral icterus.   Cardiovascular: Normal rate, regular rhythm and normal heart sounds.  Exam reveals no gallop and no friction rub.    No murmur heard.  Pulmonary/Chest: Effort normal and breath sounds normal. No respiratory distress. He has no wheezes. He has no rales.   Abdominal: Soft. Bowel sounds are normal. He exhibits no distension. There is no tenderness. There is no rebound and no guarding.   Musculoskeletal: He exhibits no edema.   Neurological: He is alert and oriented to person, place, and time. No cranial nerve deficit.   Skin: No rash noted. No erythema.   No lesions   Vitals reviewed.    Assessment:       1) Lung mass:   - pathology negative for malignancy, GMS showing fungal yeasts forms   -  differential include: Cryptococcus, Blastomyces, Histoplasma, less likely Coccidiomycosis    Plan:       1) Labs today: HIV, CD4 count, Histoplasma serum/urine antigen, Cryptococcus serum antigen, Blastomyces urine antigen, Coccidioidomycosis serum Ab    2) Depending on labs results will take further decisions    Carlos Jimenes MD  Infectious Disease Fellow, PGY-4  Pager: [phone]  [facility]

## 2017-06-22 ENCOUNTER — TELEPHONE (OUTPATIENT)
Dept: INFECTIOUS DISEASES | Facility: CLINIC | Age: 62
End: 2017-06-22

## 2017-06-22 ENCOUNTER — PATIENT MESSAGE (OUTPATIENT)
Dept: INFECTIOUS DISEASES | Facility: CLINIC | Age: 62
End: 2017-06-22

## 2017-06-22 ENCOUNTER — LAB VISIT (OUTPATIENT)
Dept: LAB | Facility: HOSPITAL | Age: 62
End: 2017-06-22
Attending: INTERNAL MEDICINE
Payer: COMMERCIAL

## 2017-06-22 DIAGNOSIS — R91.8 LUNG MASS: Primary | ICD-10-CM

## 2017-06-22 DIAGNOSIS — R91.8 LUNG MASS: ICD-10-CM

## 2017-06-22 LAB
CD3+CD4+ CELLS # BLD: 373 CELLS/UL (ref 300–1400)
CD3+CD4+ CELLS NFR BLD: 38.8 % (ref 28–57)
CRYPTOC AG SER QL LA: NEGATIVE
HIV 1+2 AB+HIV1 P24 AG SERPL QL IA: NEGATIVE

## 2017-06-22 PROCEDURE — 87449 NOS EACH ORGANISM AG IA: CPT

## 2017-06-22 RX ORDER — FLUCONAZOLE 200 MG/1
800 TABLET ORAL DAILY
Qty: 120 TABLET | Refills: 0 | Status: SHIPPED | OUTPATIENT
Start: 2017-06-22 | End: 2017-07-22

## 2017-06-22 NOTE — TELEPHONE ENCOUNTER
HIV negative. Crypto serum antigen negative. Both results discussed with patient.  Tomorrow will coordinate to send PCR from blood to identify the yeast (is a send out lab). Meanwhile will start patient in Fluconazole 800 mg PO daily.    Carlos Jimenes MD  Infectious Disease Fellow, PGY-4  Pager: [phone]  [facility]

## 2017-06-23 LAB — STRONGYLOIDES ANTIBODY IGG: NEGATIVE

## 2017-06-23 NOTE — PROGRESS NOTES
INFECTIOUS DISEASES STAFF  Patient was seen and examined with Dr. Jimenes.  I agree with the history of present illness, physical exam, assessment and plan.  Patient presented with lung mass and suspected to be a malignancy; work up revealed granulomatous inflammation with yeast forms present.  Follow up with Dr. Austin in pathology and reviewed slides including GMS stains; no organisms seen in H&E stains; those seen in GMS were in size similar to crypto; not broad base as expected from blasto and too large for histo although no background to compare.    Patient remains febrile.  Plan:  1. Obtain serologies - blasto, crypto and histo; HIV and CD4; eosinophilia noted a few years back and will obtain strongyloides for completeness but we do not think that it has anything to do with this process.  2. If unable to ascertain diagnosis, will get PCR on fixed tissue and may need additional tissue if no dx is obtained.  3. Await for above, and will start antifungals this week.

## 2017-06-24 ENCOUNTER — PATIENT MESSAGE (OUTPATIENT)
Dept: FAMILY MEDICINE | Facility: CLINIC | Age: 62
End: 2017-06-24

## 2017-06-24 LAB
HISTOPLASMA AG INTERP.: NEGATIVE
HISTOPLASMA RESULT: NORMAL NG/ML

## 2017-06-25 LAB
HISTOPLASMA AG VALUE: 0 NG/ML
HISTOPLASMA ANTIGEN URINE: NEGATIVE

## 2017-06-26 ENCOUNTER — PATIENT MESSAGE (OUTPATIENT)
Dept: INFECTIOUS DISEASES | Facility: CLINIC | Age: 62
End: 2017-06-26

## 2017-06-26 LAB
C IMMITIS AB TITR SER CF: NEGATIVE {TITER}
C IMMITIS IGG SER QL: NEGATIVE
C IMMITIS IGM SER QL: NEGATIVE

## 2017-06-27 LAB
BLASTOMYCES AG INTERP: NEGATIVE
BLASTOMYCES AG RESULT: NORMAL NG/ML
BLASTOMYCES AG SPECIMEN: NORMAL

## 2017-06-30 ENCOUNTER — PATIENT MESSAGE (OUTPATIENT)
Dept: INFECTIOUS DISEASES | Facility: CLINIC | Age: 62
End: 2017-06-30

## 2017-07-01 ENCOUNTER — PATIENT MESSAGE (OUTPATIENT)
Dept: FAMILY MEDICINE | Facility: CLINIC | Age: 62
End: 2017-07-01

## 2017-07-01 DIAGNOSIS — L30.9 DERMATITIS: ICD-10-CM

## 2017-07-03 RX ORDER — TRIAMCINOLONE ACETONIDE 1 MG/G
CREAM TOPICAL 2 TIMES DAILY
Qty: 80 G | Refills: 0 | Status: SHIPPED | OUTPATIENT
Start: 2017-07-03 | End: 2017-09-15

## 2017-07-03 NOTE — TELEPHONE ENCOUNTER
Called patient to see where he would like Triamcinolone cream patient states he would like it at the [facility].

## 2017-07-05 ENCOUNTER — PATIENT MESSAGE (OUTPATIENT)
Dept: INFECTIOUS DISEASES | Facility: CLINIC | Age: 62
End: 2017-07-05

## 2017-07-11 ENCOUNTER — PATIENT MESSAGE (OUTPATIENT)
Dept: PULMONOLOGY | Facility: CLINIC | Age: 62
End: 2017-07-11

## 2017-07-11 ENCOUNTER — OFFICE VISIT (OUTPATIENT)
Dept: INFECTIOUS DISEASES | Facility: CLINIC | Age: 62
End: 2017-07-11
Payer: COMMERCIAL

## 2017-07-11 ENCOUNTER — HOSPITAL ENCOUNTER (OUTPATIENT)
Dept: RADIOLOGY | Facility: HOSPITAL | Age: 62
Discharge: HOME | End: 2017-07-11
Attending: INTERNAL MEDICINE
Payer: COMMERCIAL

## 2017-07-11 VITALS
TEMPERATURE: 98 F | BODY MASS INDEX: 24.81 KG/M2 | WEIGHT: 173.31 LBS | SYSTOLIC BLOOD PRESSURE: 136 MMHG | DIASTOLIC BLOOD PRESSURE: 85 MMHG | HEART RATE: 97 BPM | HEIGHT: 70 IN

## 2017-07-11 DIAGNOSIS — B49 FUNGAL PNEUMONIA: ICD-10-CM

## 2017-07-11 DIAGNOSIS — B49 FUNGAL PNEUMONIA: Primary | ICD-10-CM

## 2017-07-11 DIAGNOSIS — J16.8 FUNGAL PNEUMONIA: Primary | ICD-10-CM

## 2017-07-11 DIAGNOSIS — J16.8 FUNGAL PNEUMONIA: ICD-10-CM

## 2017-07-11 PROCEDURE — 71020 XR CHEST PA AND LATERAL: CPT | Mod: TC

## 2017-07-11 PROCEDURE — 99999 PR PBB SHADOW E&M-EST. PATIENT-LVL III: CPT | Mod: PBBFAC,,, | Performed by: INTERNAL MEDICINE

## 2017-07-11 PROCEDURE — 99214 OFFICE O/P EST MOD 30 MIN: CPT | Mod: S$GLB,,, | Performed by: INTERNAL MEDICINE

## 2017-07-11 PROCEDURE — 71020 XR CHEST PA AND LATERAL: CPT | Mod: 26,,, | Performed by: RADIOLOGY

## 2017-07-11 NOTE — PROGRESS NOTES
Subjective:      Patient ID: Dariusz Urbina is a 62 y.o. male.    Chief Complaint:Follow-up      History of Present Illness    Patient seen a few weeks earlier with pulmonary process initially suspected to be malignancy; however, it was granulomatous and with fungal organisms present.  However, all fungal serologies are negative thus far.    Molecular sequencing was ordered and pending at the time of this visit.   Patient was started empirically on 800 mg fluconazole. Tolerating well.   Still short of breath.  Spoke with pathology and they will follow up on results of PCR.     Review of Systems   Constitution: Positive for chills, fever, weakness and night sweats. Negative for decreased appetite, malaise/fatigue, weight gain and weight loss.   HENT: Negative for congestion, ear pain, headaches, hearing loss, hoarse voice, sore throat and tinnitus.    Eyes: Negative for blurred vision, redness and visual disturbance.   Cardiovascular: Negative for chest pain, leg swelling and palpitations.   Respiratory: Positive for cough, sputum production and wheezing. Negative for hemoptysis and shortness of breath.    Hematologic/Lymphatic: Negative for adenopathy. Does not bruise/bleed easily.   Skin: Negative for dry skin, itching, rash and suspicious lesions.   Musculoskeletal: Negative for back pain, joint pain, myalgias and neck pain.   Gastrointestinal: Negative for abdominal pain, constipation, diarrhea, heartburn, nausea and vomiting.   Genitourinary: Negative for dysuria, flank pain, frequency, hematuria, hesitancy and urgency.   Neurological: Negative for dizziness, numbness and paresthesias.   Psychiatric/Behavioral: Negative for depression and memory loss. The patient does not have insomnia and is not nervous/anxious.      Objective:   Physical Exam   Constitutional: He is oriented to person, place, and time. He appears well-developed and well-nourished.   HENT:   Head: Normocephalic and atraumatic.   Eyes:  Conjunctivae and EOM are normal. Pupils are equal, round, and reactive to light.   Neck: Normal range of motion. Neck supple. No thyromegaly present.   Cardiovascular: Normal rate, regular rhythm and normal heart sounds.    No murmur heard.  Pulmonary/Chest: Effort normal. He has rales.   Abdominal: Soft. Bowel sounds are normal.   Musculoskeletal: Normal range of motion.   Lymphadenopathy:     He has no cervical adenopathy.   Neurological: He is alert and oriented to person, place, and time.   Skin: Skin is warm and dry.   Psychiatric: He has a normal mood and affect. His behavior is normal.   Vitals reviewed.    Assessment:       1. Fungal pneumonia          Plan:       1. Chest x-ray today.  2. Labs today.  3. Await for results of biopsy.      ADDENDUM:  Results reviewed and negative.   Spoke with Dread Maguire and will schedule consultation with CT surgery for VATS instead of bronchoscopy.    Will follow up thereafter.

## 2017-07-12 ENCOUNTER — TELEPHONE (OUTPATIENT)
Dept: CARDIOTHORACIC SURGERY | Facility: CLINIC | Age: 62
End: 2017-07-12

## 2017-07-12 ENCOUNTER — TELEPHONE (OUTPATIENT)
Dept: INFECTIOUS DISEASES | Facility: CLINIC | Age: 62
End: 2017-07-12

## 2017-07-12 ENCOUNTER — PATIENT MESSAGE (OUTPATIENT)
Dept: TRANSPLANT | Facility: CLINIC | Age: 62
End: 2017-07-12

## 2017-07-12 ENCOUNTER — PATIENT MESSAGE (OUTPATIENT)
Dept: PULMONOLOGY | Facility: CLINIC | Age: 62
End: 2017-07-12

## 2017-07-12 DIAGNOSIS — R91.8 MASS OF LOWER LOBE OF LEFT LUNG: Primary | ICD-10-CM

## 2017-07-12 DIAGNOSIS — R93.89 ABNORMAL CT OF THE CHEST: Primary | ICD-10-CM

## 2017-07-12 NOTE — TELEPHONE ENCOUNTER
----- Message from Delmis Tai sent at 7/12/2017  9:49 AM CDT -----  Contact: PT: [phone]  PT Called and wants the staff to call him back [phone].  Please call.    Thanks

## 2017-07-12 NOTE — TELEPHONE ENCOUNTER
"Received a referral from Dr. Yao re: evaluate for VATS biopsy. LLL mass noted on CT chest on 6/12/17 "Variable sized numerous opacities throughout the lungs bilaterally the largest is in the right upper lobe measuring 3.1 cm peripheral lateral and largest in the right lower lobe anteriorly measuring 4 cm with additional prominent focus in the right middle lobe inferiorly measuring 3.4 cm.  There is a large consolidation in the left lower lobe abutting the diaphragmatic surface measuring approximate 9 cm."    IR biopsy performed on 6/16/17 revealing "Fibrosis and granuloma with focal necrosis. Special stain GMS is positive for fungal yeast forms. AFB stain is negative.  Negative for malignancy."   Path sent to [facility] in [place], no fungal DNA found. Pt seen by ID and additional tissue for micro is needed at this time.    PET obtained on 6/19/17 "The largest lesion in the left lower lobe demonstrates hypermetabolic activity and an SUV max of 18.2. An additional hypermetabolic mass within the anterior segment right upper lobe demonstrates an SUV max of 14.4. No extrathoracic disease identified."    Spoke with the pt, he would like to be seen ASAP. Offered appt for tomorrow, he is also agreeable to PFT's at 845a and Dr. Rodriguez at 1015a. Provided him with location information. Message sent to Dr. Yao notifying her of the scheduled appts.  "

## 2017-07-13 ENCOUNTER — HOSPITAL ENCOUNTER (OUTPATIENT)
Dept: PULMONOLOGY | Facility: CLINIC | Age: 62
Discharge: HOME | End: 2017-07-13
Payer: COMMERCIAL

## 2017-07-13 ENCOUNTER — OFFICE VISIT (OUTPATIENT)
Dept: CARDIOTHORACIC SURGERY | Facility: CLINIC | Age: 62
End: 2017-07-13
Payer: COMMERCIAL

## 2017-07-13 VITALS
DIASTOLIC BLOOD PRESSURE: 81 MMHG | WEIGHT: 173 LBS | BODY MASS INDEX: 24.77 KG/M2 | HEIGHT: 70 IN | SYSTOLIC BLOOD PRESSURE: 150 MMHG | OXYGEN SATURATION: 96 % | HEART RATE: 84 BPM

## 2017-07-13 DIAGNOSIS — R91.8 LUNG MASS: Primary | ICD-10-CM

## 2017-07-13 DIAGNOSIS — R91.8 MASS OF LOWER LOBE OF LEFT LUNG: ICD-10-CM

## 2017-07-13 LAB
POST FEV1 FVC: 0.79
POST FEV1: 2.64
POST FVC: 3.33
PRE FEV1 FVC: 76
PRE FEV1: 2.47
PRE FVC: 3.23
PREDICTED FEV1 FVC: 80
PREDICTED FEV1: 3.31
PREDICTED FVC: 4.12

## 2017-07-13 PROCEDURE — 99999 PR PBB SHADOW E&M-EST. PATIENT-LVL IV: CPT | Mod: PBBFAC,,, | Performed by: THORACIC SURGERY (CARDIOTHORACIC VASCULAR SURGERY)

## 2017-07-13 PROCEDURE — 94729 DIFFUSING CAPACITY: CPT | Mod: S$GLB,,, | Performed by: INTERNAL MEDICINE

## 2017-07-13 PROCEDURE — 94727 GAS DIL/WSHOT DETER LNG VOL: CPT | Mod: 59,S$GLB,, | Performed by: INTERNAL MEDICINE

## 2017-07-13 PROCEDURE — 94060 EVALUATION OF WHEEZING: CPT | Mod: S$GLB,,, | Performed by: INTERNAL MEDICINE

## 2017-07-13 PROCEDURE — 99245 OFF/OP CONSLTJ NEW/EST HI 55: CPT | Mod: S$GLB,,, | Performed by: THORACIC SURGERY (CARDIOTHORACIC VASCULAR SURGERY)

## 2017-07-13 NOTE — LETTER
July 13, 2017      Rene Yao MD  [address]  [facility] [address]           [facility]  [address]  [facility] [address]  Phone: [phone]  Fax: [phone]          Patient: Dariusz Urbina   MR Number: [id number]   YOB: 1955   Date of Visit: 7/13/2017       Dear Dr. Rene Yao:    Thank you for referring Dariusz Urbina to me for evaluation. Attached you will find relevant portions of my assessment and plan of care.    If you have questions, please do not hesitate to call me. I look forward to following Dariusz Urbina along with you.    Sincerely,    Yuval Rodriguez MD    Enclosure  CC:  No Recipients    If you would like to receive this communication electronically, please contact [email or web address] or [phone] to request more information on [employer] access.    For providers and/or their staff who would like to refer a patient to [facility], please contact us through our one-stop-shop provider referral line, [facility], at [phone].    If you feel you have received this communication in error or would no longer like to receive these types of communications, please e-mail [email or web address]

## 2017-07-13 NOTE — PROGRESS NOTES
History & Physical    SUBJECTIVE:     History of Present Illness:    Patient is a 61 yo male with HLD presenting with bilateral PET avid lung masses here today for surgical biopsy evaluation. History dates back about 6-7 weeks ago with onset of daily fever up to 101, progressive SOB, generalized weakness, and night sweats. Saw PCP and attributed to URI. After no relief went back to PCP who started him on zpack. CXR revealed bilateral patchy nodules. Subsequent CT significant for LLL mass measuring 9 cm, RUL measuring 3.1cm, RLL measuring 4cm, and RML measuring 3.4cm. IR biopsy of LLL lesion with fibrosis, focal necrosis, negative AFB, and send out negative for fungal DNA. Started on fluconazole. PET scan with bilateral PET avid intrathoracic disease with LLL SUV max 18.2 and RUL SUV max 14.4. Today, continues to report overall fatigue, low grade fever, and occasional SOB/wheezing. Takes ibuprofen daily. Treadmill stress/cardiology w/u April '17 after dizziness and weakness- benign. Prior to onset of malaise daily exercise on elliptical. Denies chills, CP, nausea, vomiting, appetite changes.      Former smoker. Quit 28 years ago. 10 pack year history.  PSH: lumbar sx  Works in commercial real estate.         Chief Complaint   Patient presents with    Consult       Review of patient's allergies indicates:  No Known Allergies    Current Outpatient Prescriptions   Medication Sig Dispense Refill    fluconazole (DIFLUCAN) 200 MG Tab Take 4 tablets (800 mg total) by mouth once daily. 120 tablet 0    aspirin (ECOTRIN) 81 MG EC tablet Take 81 mg by mouth once daily.      pravastatin (PRAVACHOL) 80 MG tablet Take 1 tablet (80 mg total) by mouth once daily. 90 tablet 0    triamcinolone acetonide 0.1% (KENALOG) 0.1 % cream Apply topically 2 (two) times daily. 80 g 0     No current facility-administered medications for this visit.        Past Medical History:   Diagnosis Date    Decreased libido 11/11/2013    Dermatitis  "10/12/2012    Hyperlipidemia 11/11/2013     No past surgical history on file.  No family history on file.  Social History   Substance Use Topics    Smoking status: Former Smoker     Packs/day: 1.00     Years: 10.00     Quit date: 7/13/1989    Smokeless tobacco: Never Used    Alcohol use No        Review of Systems:  Review of Systems   Constitutional: Positive for activity change, fatigue and fever. Negative for appetite change and chills.   HENT: Positive for congestion.    Eyes: Negative for pain.   Respiratory: Positive for shortness of breath and wheezing. Negative for cough and choking.    Cardiovascular: Negative for chest pain and palpitations.   Gastrointestinal: Negative for abdominal pain, nausea and vomiting.   Genitourinary: Negative for difficulty urinating.   Musculoskeletal: Negative for arthralgias.   Skin: Negative for color change and rash.   Neurological: Negative for headaches.   Psychiatric/Behavioral: The patient is not nervous/anxious.        OBJECTIVE:     Vital Signs (Most Recent)  Pulse: 84 (07/13/17 0948)  BP: (!) 150/81 (07/13/17 0948)  SpO2: 96 % (07/13/17 0948)  5' 10" (1.778 m)  78.5 kg (173 lb)     Physical Exam:  Physical Exam   Constitutional: He is oriented to person, place, and time. He appears well-developed and well-nourished.   HENT:   Head: Normocephalic and atraumatic.   Eyes: EOM are normal. Pupils are equal, round, and reactive to light.   Neck: Normal range of motion. Neck supple. No tracheal deviation present.   Cardiovascular: Normal rate, regular rhythm, normal heart sounds and intact distal pulses.    Pulmonary/Chest: Effort normal. He has no wheezes. He has rhonchi in the left lower field.   Abdominal: Soft. He exhibits no distension.   Musculoskeletal: Normal range of motion.   Lymphadenopathy:     He has no cervical adenopathy.   Neurological: He is alert and oriented to person, place, and time.   Skin: Skin is warm and dry.   Psychiatric: He has a normal mood " and affect. Thought content normal.       Treadmill Stress 4/24/17    Impression: NORMAL MYOCARDIAL PERFUSION  1. The perfusion scan is free of evidence for myocardial ischemia or injury.   2. Resting wall motion is physiologic.   3. Resting LV function is normal.   4. The ventricular volumes are normal at rest and stress.   5. The extracardiac distribution of radioactivity is normal.     Pathology 6/14/17:   Fibrosis and granuloma with focal necrosis.   Special stain GMS is positive for fungal yeast forms.   AFB stain is negative.   Negative for malignancy.   No fungal DNA seen     Chest CT 6/12/17:  Numerous variable size scattered small rounded opacities in the lungs bilaterally which correspond to opacities seen on chest x-ray primarily concerning for metastatic disease.  Clinical correlation and further evaluation recommended.  There is large consolidation left lower lobe cannot exclude underlying mass with superimposed pneumonia not excluded.    PET 6/19/17:  Positron emission tomography images demonstrate numerous masses identified throughout both lung fields concerning for malignancy. The largest lesion in the left lower lobe demonstrates hypermetabolic activity and an SUV max of 18.2. An additional hypermetabolic mass within the anterior segment right upper lobe demonstrates an SUV max of 14.4. No extrathoracic disease identified.    PFTs 7/13/17:  FEV1 - 2.47L 75%  DLCO - 21.3  82%      ASSESSMENT/PLAN:     Patient is a 63 yo male with HLD presenting with bilateral PET avid lung masses here today for surgical biopsy evaluation.     PLAN:Plan     Will proceed to OR on 7/19/17 for bronchoscopy, right VATS with wedge resection, possible thoracotomy.   Appropriate patient education regarding the lexii-operative period as well as intraoperative details were discussed. Risks, including but not limited to, bleeding, infection, pain and anesthetic complication were discussed. Patient was given the opportunity to  ask questions and to have those questions answered to their satisfaction. Patient verbalized understanding to both procedure and associated risks. Consent was obtained.    ATTENDING ATTESTATION:    I evaluated the patient and I agree with the assessment and plan.  Scattered bilateral lung nodules that are PET-avid.  Percutaneous biopsy initially concerning for fungal infection, PCR not consistent.  Referred for wedge resection for diagnosis.  Dominant mass in LLL not amenable to thoracoscopic biopsy, so will target right-sided lesion.  This may present difficulty with single-lung ventilation on left, so approach may be changed intraoperatively based on ability to ventilate.    Long discussion with patient about the indication and rationale for wedge resection, the details of the operation, as well as its risks, benefits and potential outcomes, including but not limited to prolonged air leak requiring chest tube, respiratory failure requiring prolonged mechanical ventilation, pneumonia and post-op arrhythmia.  We also discussed the alternative treatments, including repeat percutaneous or bronchoscopic biopsy, and the risks of no treatment.  He was given the opportunity to ask questions and I answered all of his questions to his satisfaction.  He demonstrated understanding and appreciation of above info.  He has decided to proceed with Right Thoracoscopy with Diagnostic Wedge Resection on 7/19/2017.

## 2017-07-18 ENCOUNTER — ANESTHESIA EVENT (OUTPATIENT)
Dept: SURGERY | Facility: HOSPITAL | Age: 62
DRG: 165 | End: 2017-07-18
Payer: COMMERCIAL

## 2017-07-18 ENCOUNTER — TELEPHONE (OUTPATIENT)
Dept: CARDIOTHORACIC SURGERY | Facility: CLINIC | Age: 62
End: 2017-07-18

## 2017-07-19 ENCOUNTER — HOSPITAL ENCOUNTER (OUTPATIENT)
Facility: HOSPITAL | Age: 62
Discharge: HOME | DRG: 165 | End: 2017-07-20
Attending: THORACIC SURGERY (CARDIOTHORACIC VASCULAR SURGERY) | Admitting: THORACIC SURGERY (CARDIOTHORACIC VASCULAR SURGERY)
Payer: COMMERCIAL

## 2017-07-19 ENCOUNTER — SURGERY (OUTPATIENT)
Age: 62
End: 2017-07-19

## 2017-07-19 ENCOUNTER — ANESTHESIA (OUTPATIENT)
Dept: SURGERY | Facility: HOSPITAL | Age: 62
DRG: 165 | End: 2017-07-19
Payer: COMMERCIAL

## 2017-07-19 DIAGNOSIS — R91.8 LUNG MASS: Primary | ICD-10-CM

## 2017-07-19 DIAGNOSIS — R91.8 LUNG NODULES: ICD-10-CM

## 2017-07-19 LAB
ABO GROUP BLD: NORMAL
BLD GP AB SCN CELLS X3 SERPL QL: NORMAL
BLD GP AB SCN CELLS X3 SERPL QL: NORMAL
GRAM STN SPEC: NORMAL
RH BLD: NORMAL

## 2017-07-19 PROCEDURE — 87075 CULTR BACTERIA EXCEPT BLOOD: CPT | Mod: 59

## 2017-07-19 PROCEDURE — 25000003 PHARM REV CODE 250: Performed by: THORACIC SURGERY (CARDIOTHORACIC VASCULAR SURGERY)

## 2017-07-19 PROCEDURE — 88331 PATH CONSLTJ SURG 1 BLK 1SPC: CPT | Performed by: PATHOLOGY

## 2017-07-19 PROCEDURE — 71000039 HC RECOVERY, EACH ADD'L HOUR: Performed by: THORACIC SURGERY (CARDIOTHORACIC VASCULAR SURGERY)

## 2017-07-19 PROCEDURE — 71000033 HC RECOVERY, INTIAL HOUR: Performed by: THORACIC SURGERY (CARDIOTHORACIC VASCULAR SURGERY)

## 2017-07-19 PROCEDURE — 36000710: Performed by: THORACIC SURGERY (CARDIOTHORACIC VASCULAR SURGERY)

## 2017-07-19 PROCEDURE — 94761 N-INVAS EAR/PLS OXIMETRY MLT: CPT

## 2017-07-19 PROCEDURE — 25000003 PHARM REV CODE 250: Performed by: PHYSICIAN ASSISTANT

## 2017-07-19 PROCEDURE — C1769 GUIDE WIRE: HCPCS | Performed by: THORACIC SURGERY (CARDIOTHORACIC VASCULAR SURGERY)

## 2017-07-19 PROCEDURE — 36000711: Performed by: THORACIC SURGERY (CARDIOTHORACIC VASCULAR SURGERY)

## 2017-07-19 PROCEDURE — 25000003 PHARM REV CODE 250: Performed by: SURGERY

## 2017-07-19 PROCEDURE — 87102 FUNGUS ISOLATION CULTURE: CPT | Mod: 59

## 2017-07-19 PROCEDURE — 25000003 PHARM REV CODE 250: Performed by: ANESTHESIOLOGY

## 2017-07-19 PROCEDURE — 31624 DX BRONCHOSCOPE/LAVAGE: CPT | Mod: 51,,, | Performed by: THORACIC SURGERY (CARDIOTHORACIC VASCULAR SURGERY)

## 2017-07-19 PROCEDURE — G0378 HOSPITAL OBSERVATION PER HR: HCPCS

## 2017-07-19 PROCEDURE — 87076 CULTURE ANAEROBE IDENT EACH: CPT

## 2017-07-19 PROCEDURE — 88112 CYTOPATH CELL ENHANCE TECH: CPT | Mod: 26,,, | Performed by: PATHOLOGY

## 2017-07-19 PROCEDURE — 88313 SPECIAL STAINS GROUP 2: CPT | Mod: 26,,, | Performed by: PATHOLOGY

## 2017-07-19 PROCEDURE — 88312 SPECIAL STAINS GROUP 1: CPT | Mod: 26,,, | Performed by: PATHOLOGY

## 2017-07-19 PROCEDURE — 86901 BLOOD TYPING SEROLOGIC RH(D): CPT

## 2017-07-19 PROCEDURE — D9220A PRA ANESTHESIA: Mod: ,,, | Performed by: ANESTHESIOLOGY

## 2017-07-19 PROCEDURE — 87116 MYCOBACTERIA CULTURE: CPT | Mod: 59

## 2017-07-19 PROCEDURE — 87205 SMEAR GRAM STAIN: CPT

## 2017-07-19 PROCEDURE — 63600175 PHARM REV CODE 636 W HCPCS: Performed by: THORACIC SURGERY (CARDIOTHORACIC VASCULAR SURGERY)

## 2017-07-19 PROCEDURE — 86850 RBC ANTIBODY SCREEN: CPT | Mod: 91

## 2017-07-19 PROCEDURE — 88305 TISSUE EXAM BY PATHOLOGIST: CPT | Mod: 26,,, | Performed by: PATHOLOGY

## 2017-07-19 PROCEDURE — 27201423 OPTIME MED/SURG SUP & DEVICES STERILE SUPPLY: Performed by: THORACIC SURGERY (CARDIOTHORACIC VASCULAR SURGERY)

## 2017-07-19 PROCEDURE — 37000009 HC ANESTHESIA EA ADD 15 MINS: Performed by: THORACIC SURGERY (CARDIOTHORACIC VASCULAR SURGERY)

## 2017-07-19 PROCEDURE — 86900 BLOOD TYPING SEROLOGIC ABO: CPT

## 2017-07-19 PROCEDURE — 63600175 PHARM REV CODE 636 W HCPCS: Performed by: ANESTHESIOLOGY

## 2017-07-19 PROCEDURE — C1729 CATH, DRAINAGE: HCPCS | Performed by: THORACIC SURGERY (CARDIOTHORACIC VASCULAR SURGERY)

## 2017-07-19 PROCEDURE — 32666 THORACOSCOPY W/WEDGE RESECT: CPT | Mod: ,,, | Performed by: THORACIC SURGERY (CARDIOTHORACIC VASCULAR SURGERY)

## 2017-07-19 PROCEDURE — 87015 SPECIMEN INFECT AGNT CONCNTJ: CPT | Mod: 59

## 2017-07-19 PROCEDURE — 27000221 HC OXYGEN, UP TO 24 HOURS

## 2017-07-19 PROCEDURE — 37000008 HC ANESTHESIA 1ST 15 MINUTES: Performed by: THORACIC SURGERY (CARDIOTHORACIC VASCULAR SURGERY)

## 2017-07-19 PROCEDURE — 87070 CULTURE OTHR SPECIMN AEROBIC: CPT

## 2017-07-19 PROCEDURE — 20600001 HC STEP DOWN PRIVATE ROOM

## 2017-07-19 PROCEDURE — 87176 TISSUE HOMOGENIZATION CULTR: CPT

## 2017-07-19 PROCEDURE — 88331 PATH CONSLTJ SURG 1 BLK 1SPC: CPT | Mod: 26,,, | Performed by: PATHOLOGY

## 2017-07-19 PROCEDURE — 88305 TISSUE EXAM BY PATHOLOGIST: CPT | Performed by: PATHOLOGY

## 2017-07-19 PROCEDURE — 88309 TISSUE EXAM BY PATHOLOGIST: CPT | Mod: 26,,, | Performed by: PATHOLOGY

## 2017-07-19 RX ORDER — PHENYLEPHRINE HYDROCHLORIDE 10 MG/ML
INJECTION INTRAVENOUS
Status: DISCONTINUED | OUTPATIENT
Start: 2017-07-19 | End: 2017-07-19

## 2017-07-19 RX ORDER — SODIUM CHLORIDE 0.9 % (FLUSH) 0.9 %
3 SYRINGE (ML) INJECTION EVERY 8 HOURS
Status: DISCONTINUED | OUTPATIENT
Start: 2017-07-19 | End: 2017-07-20 | Stop reason: HOSPADM

## 2017-07-19 RX ORDER — PROPOFOL 10 MG/ML
VIAL (ML) INTRAVENOUS
Status: DISCONTINUED | OUTPATIENT
Start: 2017-07-19 | End: 2017-07-19

## 2017-07-19 RX ORDER — ENOXAPARIN SODIUM 100 MG/ML
40 INJECTION SUBCUTANEOUS
Status: DISCONTINUED | OUTPATIENT
Start: 2017-07-20 | End: 2017-07-20 | Stop reason: HOSPADM

## 2017-07-19 RX ORDER — ACETAMINOPHEN 325 MG/1
650 TABLET ORAL EVERY 8 HOURS PRN
Status: DISCONTINUED | OUTPATIENT
Start: 2017-07-19 | End: 2017-07-20 | Stop reason: HOSPADM

## 2017-07-19 RX ORDER — DIPHENHYDRAMINE HYDROCHLORIDE 50 MG/ML
25 INJECTION INTRAMUSCULAR; INTRAVENOUS EVERY 4 HOURS PRN
Status: DISCONTINUED | OUTPATIENT
Start: 2017-07-19 | End: 2017-07-20 | Stop reason: HOSPADM

## 2017-07-19 RX ORDER — CEFAZOLIN SODIUM 2 G/50ML
2 SOLUTION INTRAVENOUS
Status: COMPLETED | OUTPATIENT
Start: 2017-07-19 | End: 2017-07-20

## 2017-07-19 RX ORDER — ONDANSETRON 2 MG/ML
INJECTION INTRAMUSCULAR; INTRAVENOUS
Status: DISCONTINUED | OUTPATIENT
Start: 2017-07-19 | End: 2017-07-19

## 2017-07-19 RX ORDER — OXYCODONE AND ACETAMINOPHEN 10; 325 MG/1; MG/1
1 TABLET ORAL EVERY 4 HOURS PRN
Status: DISCONTINUED | OUTPATIENT
Start: 2017-07-19 | End: 2017-07-20 | Stop reason: HOSPADM

## 2017-07-19 RX ORDER — HYDROMORPHONE HYDROCHLORIDE 1 MG/ML
0.2 INJECTION, SOLUTION INTRAMUSCULAR; INTRAVENOUS; SUBCUTANEOUS EVERY 5 MIN PRN
Status: DISCONTINUED | OUTPATIENT
Start: 2017-07-19 | End: 2017-07-19 | Stop reason: HOSPADM

## 2017-07-19 RX ORDER — NEOSTIGMINE METHYLSULFATE 1 MG/ML
INJECTION, SOLUTION INTRAVENOUS
Status: DISCONTINUED | OUTPATIENT
Start: 2017-07-19 | End: 2017-07-19

## 2017-07-19 RX ORDER — PRAVASTATIN SODIUM 40 MG/1
80 TABLET ORAL DAILY
Status: DISCONTINUED | OUTPATIENT
Start: 2017-07-19 | End: 2017-07-20 | Stop reason: HOSPADM

## 2017-07-19 RX ORDER — ROCURONIUM BROMIDE 10 MG/ML
INJECTION, SOLUTION INTRAVENOUS
Status: DISCONTINUED | OUTPATIENT
Start: 2017-07-19 | End: 2017-07-19

## 2017-07-19 RX ORDER — SODIUM CHLORIDE 9 MG/ML
INJECTION, SOLUTION INTRAVENOUS CONTINUOUS
Status: DISCONTINUED | OUTPATIENT
Start: 2017-07-19 | End: 2017-07-19

## 2017-07-19 RX ORDER — FLUCONAZOLE 200 MG/1
800 TABLET ORAL DAILY
Status: DISCONTINUED | OUTPATIENT
Start: 2017-07-19 | End: 2017-07-20 | Stop reason: HOSPADM

## 2017-07-19 RX ORDER — SODIUM CHLORIDE 0.9 % (FLUSH) 0.9 %
3 SYRINGE (ML) INJECTION
Status: DISCONTINUED | OUTPATIENT
Start: 2017-07-19 | End: 2017-07-19

## 2017-07-19 RX ORDER — ACETAMINOPHEN 10 MG/ML
INJECTION, SOLUTION INTRAVENOUS
Status: DISCONTINUED | OUTPATIENT
Start: 2017-07-19 | End: 2017-07-19

## 2017-07-19 RX ORDER — ASPIRIN 81 MG/1
81 TABLET ORAL EVERY MORNING
Status: DISCONTINUED | OUTPATIENT
Start: 2017-07-20 | End: 2017-07-20 | Stop reason: HOSPADM

## 2017-07-19 RX ORDER — LIDOCAINE HYDROCHLORIDE 10 MG/ML
1 INJECTION, SOLUTION EPIDURAL; INFILTRATION; INTRACAUDAL; PERINEURAL ONCE
Status: COMPLETED | OUTPATIENT
Start: 2017-07-19 | End: 2017-07-19

## 2017-07-19 RX ORDER — OXYCODONE AND ACETAMINOPHEN 5; 325 MG/1; MG/1
1 TABLET ORAL EVERY 4 HOURS PRN
Status: DISCONTINUED | OUTPATIENT
Start: 2017-07-19 | End: 2017-07-20 | Stop reason: HOSPADM

## 2017-07-19 RX ORDER — MIDAZOLAM HYDROCHLORIDE 1 MG/ML
INJECTION, SOLUTION INTRAMUSCULAR; INTRAVENOUS
Status: DISCONTINUED | OUTPATIENT
Start: 2017-07-19 | End: 2017-07-19

## 2017-07-19 RX ORDER — HYDROMORPHONE HYDROCHLORIDE 1 MG/ML
0.5 INJECTION, SOLUTION INTRAMUSCULAR; INTRAVENOUS; SUBCUTANEOUS
Status: DISCONTINUED | OUTPATIENT
Start: 2017-07-19 | End: 2017-07-20 | Stop reason: HOSPADM

## 2017-07-19 RX ORDER — LIDOCAINE HCL/PF 100 MG/5ML
SYRINGE (ML) INTRAVENOUS
Status: DISCONTINUED | OUTPATIENT
Start: 2017-07-19 | End: 2017-07-19

## 2017-07-19 RX ORDER — FENTANYL CITRATE 50 UG/ML
INJECTION, SOLUTION INTRAMUSCULAR; INTRAVENOUS
Status: DISCONTINUED | OUTPATIENT
Start: 2017-07-19 | End: 2017-07-19

## 2017-07-19 RX ORDER — ONDANSETRON 8 MG/1
8 TABLET, ORALLY DISINTEGRATING ORAL EVERY 8 HOURS PRN
Status: DISCONTINUED | OUTPATIENT
Start: 2017-07-19 | End: 2017-07-20 | Stop reason: HOSPADM

## 2017-07-19 RX ORDER — GLYCOPYRROLATE 0.2 MG/ML
INJECTION INTRAMUSCULAR; INTRAVENOUS
Status: DISCONTINUED | OUTPATIENT
Start: 2017-07-19 | End: 2017-07-19

## 2017-07-19 RX ORDER — ENOXAPARIN SODIUM 100 MG/ML
40 INJECTION SUBCUTANEOUS
Status: DISCONTINUED | OUTPATIENT
Start: 2017-07-20 | End: 2017-07-19

## 2017-07-19 RX ORDER — PANTOPRAZOLE SODIUM 40 MG/1
40 TABLET, DELAYED RELEASE ORAL DAILY
Status: DISCONTINUED | OUTPATIENT
Start: 2017-07-19 | End: 2017-07-20 | Stop reason: HOSPADM

## 2017-07-19 RX ADMIN — LIDOCAINE HYDROCHLORIDE 10 MG: 10 INJECTION, SOLUTION EPIDURAL; INFILTRATION; INTRACAUDAL; PERINEURAL at 09:07

## 2017-07-19 RX ADMIN — BUPIVACAINE 20 ML: 13.3 INJECTION, SUSPENSION, LIPOSOMAL INFILTRATION at 12:07

## 2017-07-19 RX ADMIN — PHENYLEPHRINE HYDROCHLORIDE 200 MCG: 10 INJECTION INTRAVENOUS at 11:07

## 2017-07-19 RX ADMIN — PANTOPRAZOLE SODIUM 40 MG: 40 TABLET, DELAYED RELEASE ORAL at 04:07

## 2017-07-19 RX ADMIN — PROPOFOL 50 MG: 10 INJECTION, EMULSION INTRAVENOUS at 10:07

## 2017-07-19 RX ADMIN — CEFAZOLIN SODIUM 2 G: 2 SOLUTION INTRAVENOUS at 07:07

## 2017-07-19 RX ADMIN — FENTANYL CITRATE 125 MCG: 50 INJECTION, SOLUTION INTRAMUSCULAR; INTRAVENOUS at 10:07

## 2017-07-19 RX ADMIN — FENTANYL CITRATE 50 MCG: 50 INJECTION, SOLUTION INTRAMUSCULAR; INTRAVENOUS at 12:07

## 2017-07-19 RX ADMIN — SODIUM CHLORIDE, SODIUM GLUCONATE, SODIUM ACETATE, POTASSIUM CHLORIDE, MAGNESIUM CHLORIDE, SODIUM PHOSPHATE, DIBASIC, AND POTASSIUM PHOSPHATE: .53; .5; .37; .037; .03; .012; .00082 INJECTION, SOLUTION INTRAVENOUS at 10:07

## 2017-07-19 RX ADMIN — HYDROMORPHONE HYDROCHLORIDE 0.2 MG: 1 INJECTION, SOLUTION INTRAMUSCULAR; INTRAVENOUS; SUBCUTANEOUS at 01:07

## 2017-07-19 RX ADMIN — Medication 3 ML: at 10:07

## 2017-07-19 RX ADMIN — OXYCODONE HYDROCHLORIDE AND ACETAMINOPHEN 1 TABLET: 10; 325 TABLET ORAL at 07:07

## 2017-07-19 RX ADMIN — ONDANSETRON 4 MG: 2 INJECTION INTRAMUSCULAR; INTRAVENOUS at 12:07

## 2017-07-19 RX ADMIN — HYDROMORPHONE HYDROCHLORIDE 0.2 MG: 1 INJECTION, SOLUTION INTRAMUSCULAR; INTRAVENOUS; SUBCUTANEOUS at 04:07

## 2017-07-19 RX ADMIN — MIDAZOLAM HYDROCHLORIDE 2 MG: 1 INJECTION, SOLUTION INTRAMUSCULAR; INTRAVENOUS at 10:07

## 2017-07-19 RX ADMIN — OXYCODONE HYDROCHLORIDE AND ACETAMINOPHEN 1 TABLET: 10; 325 TABLET ORAL at 11:07

## 2017-07-19 RX ADMIN — PROPOFOL 150 MG: 10 INJECTION, EMULSION INTRAVENOUS at 10:07

## 2017-07-19 RX ADMIN — HYDROMORPHONE HYDROCHLORIDE 0.2 MG: 1 INJECTION, SOLUTION INTRAMUSCULAR; INTRAVENOUS; SUBCUTANEOUS at 02:07

## 2017-07-19 RX ADMIN — ROCURONIUM BROMIDE 10 MG: 10 INJECTION, SOLUTION INTRAVENOUS at 11:07

## 2017-07-19 RX ADMIN — SODIUM CHLORIDE: 0.9 INJECTION, SOLUTION INTRAVENOUS at 09:07

## 2017-07-19 RX ADMIN — OXYCODONE HYDROCHLORIDE AND ACETAMINOPHEN 1 TABLET: 10; 325 TABLET ORAL at 01:07

## 2017-07-19 RX ADMIN — FENTANYL CITRATE 50 MCG: 50 INJECTION, SOLUTION INTRAMUSCULAR; INTRAVENOUS at 11:07

## 2017-07-19 RX ADMIN — FLUCONAZOLE 800 MG: 200 TABLET ORAL at 05:07

## 2017-07-19 RX ADMIN — ROCURONIUM BROMIDE 50 MG: 10 INJECTION, SOLUTION INTRAVENOUS at 10:07

## 2017-07-19 RX ADMIN — NEOSTIGMINE METHYLSULFATE 5 MG: 1 INJECTION INTRAVENOUS at 12:07

## 2017-07-19 RX ADMIN — GLYCOPYRROLATE 0.6 MG: 0.2 INJECTION, SOLUTION INTRAMUSCULAR; INTRAVENOUS at 12:07

## 2017-07-19 RX ADMIN — PRAVASTATIN SODIUM 80 MG: 20 TABLET ORAL at 01:07

## 2017-07-19 RX ADMIN — ACETAMINOPHEN 1000 MG: 10 INJECTION, SOLUTION INTRAVENOUS at 12:07

## 2017-07-19 RX ADMIN — LIDOCAINE HYDROCHLORIDE 80 MG: 20 INJECTION, SOLUTION INTRAVENOUS at 10:07

## 2017-07-19 RX ADMIN — PROPOFOL 100 MG: 10 INJECTION, EMULSION INTRAVENOUS at 10:07

## 2017-07-19 RX ADMIN — Medication 2 G: at 11:07

## 2017-07-19 RX ADMIN — FENTANYL CITRATE 25 MCG: 50 INJECTION, SOLUTION INTRAMUSCULAR; INTRAVENOUS at 12:07

## 2017-07-19 NOTE — TRANSFER OF CARE
"Anesthesia Transfer of Care Note    Patient: Dariusz Urbina    Procedure(s) Performed: Procedure(s) (LRB):  THORACOSCOPY-VIDEO ASSISTED (VATS) W/WEDGE LUNG RESECTION (Right)  BRONCHOSCOPY-OPERATIVE,FLEXIBLE (N/A)  LAVAGE-ALVEOLAR (N/A)    Patient location: PACU    Anesthesia Type: general    Transport from OR: Transported from OR on 100% O2 by closed face mask with adequate spontaneous ventilation    Post pain: adequate analgesia    Post assessment: no apparent anesthetic complications    Post vital signs: stable    Level of consciousness: awake, alert and oriented    Nausea/Vomiting: no nausea/vomiting    Complications: none          Last vitals:   Visit Vitals  /66   Pulse 65   Temp 36.4 °C (97.5 °F) (Oral)   Resp 17   Ht 5' 10" (1.778 m)   Wt 78 kg (172 lb)   SpO2 95%   BMI 24.68 kg/m²     "

## 2017-07-19 NOTE — H&P (VIEW-ONLY)
History & Physical    SUBJECTIVE:     History of Present Illness:    Patient is a 61 yo male with HLD presenting with bilateral PET avid lung masses here today for surgical biopsy evaluation. History dates back about 6-7 weeks ago with onset of daily fever up to 101, progressive SOB, generalized weakness, and night sweats. Saw PCP and attributed to URI. After no relief went back to PCP who started him on zpack. CXR revealed bilateral patchy nodules. Subsequent CT significant for LLL mass measuring 9 cm, RUL measuring 3.1cm, RLL measuring 4cm, and RML measuring 3.4cm. IR biopsy of LLL lesion with fibrosis, focal necrosis, negative AFB, and send out negative for fungal DNA. Started on fluconazole. PET scan with bilateral PET avid intrathoracic disease with LLL SUV max 18.2 and RUL SUV max 14.4. Today, continues to report overall fatigue, low grade fever, and occasional SOB/wheezing. Takes ibuprofen daily. Treadmill stress/cardiology w/u April '17 after dizziness and weakness- benign. Prior to onset of malaise daily exercise on elliptical. Denies chills, CP, nausea, vomiting, appetite changes.      Former smoker. Quit 28 years ago. 10 pack year history.  PSH: lumbar sx  Works in commercial real estate.         Chief Complaint   Patient presents with    Consult       Review of patient's allergies indicates:  No Known Allergies    Current Outpatient Prescriptions   Medication Sig Dispense Refill    fluconazole (DIFLUCAN) 200 MG Tab Take 4 tablets (800 mg total) by mouth once daily. 120 tablet 0    aspirin (ECOTRIN) 81 MG EC tablet Take 81 mg by mouth once daily.      pravastatin (PRAVACHOL) 80 MG tablet Take 1 tablet (80 mg total) by mouth once daily. 90 tablet 0    triamcinolone acetonide 0.1% (KENALOG) 0.1 % cream Apply topically 2 (two) times daily. 80 g 0     No current facility-administered medications for this visit.        Past Medical History:   Diagnosis Date    Decreased libido 11/11/2013    Dermatitis  "10/12/2012    Hyperlipidemia 11/11/2013     No past surgical history on file.  No family history on file.  Social History   Substance Use Topics    Smoking status: Former Smoker     Packs/day: 1.00     Years: 10.00     Quit date: 7/13/1989    Smokeless tobacco: Never Used    Alcohol use No        Review of Systems:  Review of Systems   Constitutional: Positive for activity change, fatigue and fever. Negative for appetite change and chills.   HENT: Positive for congestion.    Eyes: Negative for pain.   Respiratory: Positive for shortness of breath and wheezing. Negative for cough and choking.    Cardiovascular: Negative for chest pain and palpitations.   Gastrointestinal: Negative for abdominal pain, nausea and vomiting.   Genitourinary: Negative for difficulty urinating.   Musculoskeletal: Negative for arthralgias.   Skin: Negative for color change and rash.   Neurological: Negative for headaches.   Psychiatric/Behavioral: The patient is not nervous/anxious.        OBJECTIVE:     Vital Signs (Most Recent)  Pulse: 84 (07/13/17 0948)  BP: (!) 150/81 (07/13/17 0948)  SpO2: 96 % (07/13/17 0948)  5' 10" (1.778 m)  78.5 kg (173 lb)     Physical Exam:  Physical Exam   Constitutional: He is oriented to person, place, and time. He appears well-developed and well-nourished.   HENT:   Head: Normocephalic and atraumatic.   Eyes: EOM are normal. Pupils are equal, round, and reactive to light.   Neck: Normal range of motion. Neck supple. No tracheal deviation present.   Cardiovascular: Normal rate, regular rhythm, normal heart sounds and intact distal pulses.    Pulmonary/Chest: Effort normal. He has no wheezes. He has rhonchi in the left lower field.   Abdominal: Soft. He exhibits no distension.   Musculoskeletal: Normal range of motion.   Lymphadenopathy:     He has no cervical adenopathy.   Neurological: He is alert and oriented to person, place, and time.   Skin: Skin is warm and dry.   Psychiatric: He has a normal mood " and affect. Thought content normal.       Treadmill Stress 4/24/17    Impression: NORMAL MYOCARDIAL PERFUSION  1. The perfusion scan is free of evidence for myocardial ischemia or injury.   2. Resting wall motion is physiologic.   3. Resting LV function is normal.   4. The ventricular volumes are normal at rest and stress.   5. The extracardiac distribution of radioactivity is normal.     Pathology 6/14/17:   Fibrosis and granuloma with focal necrosis.   Special stain GMS is positive for fungal yeast forms.   AFB stain is negative.   Negative for malignancy.   No fungal DNA seen     Chest CT 6/12/17:  Numerous variable size scattered small rounded opacities in the lungs bilaterally which correspond to opacities seen on chest x-ray primarily concerning for metastatic disease.  Clinical correlation and further evaluation recommended.  There is large consolidation left lower lobe cannot exclude underlying mass with superimposed pneumonia not excluded.    PET 6/19/17:  Positron emission tomography images demonstrate numerous masses identified throughout both lung fields concerning for malignancy. The largest lesion in the left lower lobe demonstrates hypermetabolic activity and an SUV max of 18.2. An additional hypermetabolic mass within the anterior segment right upper lobe demonstrates an SUV max of 14.4. No extrathoracic disease identified.    PFTs 7/13/17:  FEV1 - 2.47L 75%  DLCO - 21.3  82%      ASSESSMENT/PLAN:     Patient is a 61 yo male with HLD presenting with bilateral PET avid lung masses here today for surgical biopsy evaluation.     PLAN:Plan     Will proceed to OR on 7/19/17 for bronchoscopy, right VATS with wedge resection, possible thoracotomy.   Appropriate patient education regarding the lexii-operative period as well as intraoperative details were discussed. Risks, including but not limited to, bleeding, infection, pain and anesthetic complication were discussed. Patient was given the opportunity to  ask questions and to have those questions answered to their satisfaction. Patient verbalized understanding to both procedure and associated risks. Consent was obtained.    ATTENDING ATTESTATION:    I evaluated the patient and I agree with the assessment and plan.  Scattered bilateral lung nodules that are PET-avid.  Percutaneous biopsy initially concerning for fungal infection, PCR not consistent.  Referred for wedge resection for diagnosis.  Dominant mass in LLL not amenable to thoracoscopic biopsy, so will target right-sided lesion.  This may present difficulty with single-lung ventilation on left, so approach may be changed intraoperatively based on ability to ventilate.    Long discussion with patient about the indication and rationale for wedge resection, the details of the operation, as well as its risks, benefits and potential outcomes, including but not limited to prolonged air leak requiring chest tube, respiratory failure requiring prolonged mechanical ventilation, pneumonia and post-op arrhythmia.  We also discussed the alternative treatments, including repeat percutaneous or bronchoscopic biopsy, and the risks of no treatment.  He was given the opportunity to ask questions and I answered all of his questions to his satisfaction.  He demonstrated understanding and appreciation of above info.  He has decided to proceed with Right Thoracoscopy with Diagnostic Wedge Resection on 7/19/2017.

## 2017-07-19 NOTE — INTERVAL H&P NOTE
The patient has been examined and the H&P has been reviewed:    I concur with the findings and no changes have occurred since H&P was written.    Anesthesia/Surgery risks, benefits and alternative options discussed and understood by patient/family.          Active Hospital Problems    Diagnosis  POA    Lung nodules [R91.8]  Yes    Lung mass [R91.8]  Yes      Resolved Hospital Problems    Diagnosis Date Resolved POA   No resolved problems to display.

## 2017-07-19 NOTE — BRIEF OP NOTE
[facility]  Brief Operative Note    SUMMARY     Surgery Date: 7/19/2017     Surgeon(s) and Role:     * Jonathan Schoen, MD - Resident - Assisting     * Yuval Rodriguez MD - Primary        Pre-op Diagnosis:  Lung mass [R91.8]    Post-op Diagnosis:  Post-Op Diagnosis Codes:     * Lung mass [R91.8]    Procedure(s) (LRB):  THORACOSCOPY-VIDEO ASSISTED (VATS) W/WEDGE LUNG RESECTION (Right)  BRONCHOSCOPY-OPERATIVE,FLEXIBLE (N/A)  LAVAGE-ALVEOLAR (N/A)    Anesthesia: General    Description of Procedure: bronchoscopy with LLL BAL with no concerning findings.  R VATS with upper lobe wedge resection of grossly abnormal nodule -- sent for frozen, permanent, and cultures.  28 Fr straight chest tube placed.    Description of the findings of the procedure: as above    Estimated Blood Loss: 20 mL         Specimens:   Specimen (12h ago through future)    Start     Ordered    07/19/17 1156  Specimen to Pathology - Surgery  Once     Comments:  Right upper lobe for Frozen section then to permanent      07/19/17 1159

## 2017-07-20 VITALS
HEIGHT: 70 IN | RESPIRATION RATE: 17 BRPM | SYSTOLIC BLOOD PRESSURE: 117 MMHG | HEART RATE: 73 BPM | OXYGEN SATURATION: 92 % | TEMPERATURE: 98 F | WEIGHT: 172.38 LBS | DIASTOLIC BLOOD PRESSURE: 59 MMHG | BODY MASS INDEX: 24.68 KG/M2

## 2017-07-20 DIAGNOSIS — J84.9 ILD (INTERSTITIAL LUNG DISEASE): Primary | ICD-10-CM

## 2017-07-20 DIAGNOSIS — R91.8 LUNG MASS: Primary | ICD-10-CM

## 2017-07-20 PROCEDURE — 25000003 PHARM REV CODE 250: Performed by: SURGERY

## 2017-07-20 PROCEDURE — 63600175 PHARM REV CODE 636 W HCPCS: Performed by: THORACIC SURGERY (CARDIOTHORACIC VASCULAR SURGERY)

## 2017-07-20 PROCEDURE — G0378 HOSPITAL OBSERVATION PER HR: HCPCS

## 2017-07-20 RX ORDER — OXYCODONE AND ACETAMINOPHEN 5; 325 MG/1; MG/1
1 TABLET ORAL EVERY 4 HOURS PRN
Qty: 51 TABLET | Refills: 0 | Status: SHIPPED | OUTPATIENT
Start: 2017-07-20 | End: 2018-03-06

## 2017-07-20 RX ADMIN — OXYCODONE HYDROCHLORIDE AND ACETAMINOPHEN 1 TABLET: 10; 325 TABLET ORAL at 04:07

## 2017-07-20 RX ADMIN — CEFAZOLIN SODIUM 2 G: 2 SOLUTION INTRAVENOUS at 04:07

## 2017-07-20 RX ADMIN — FLUCONAZOLE 800 MG: 200 TABLET ORAL at 08:07

## 2017-07-20 RX ADMIN — Medication 3 ML: at 05:07

## 2017-07-20 RX ADMIN — CEFAZOLIN SODIUM 2 G: 2 SOLUTION INTRAVENOUS at 12:07

## 2017-07-20 RX ADMIN — OXYCODONE HYDROCHLORIDE AND ACETAMINOPHEN 1 TABLET: 10; 325 TABLET ORAL at 08:07

## 2017-07-20 RX ADMIN — ASPIRIN 81 MG: 81 TABLET, COATED ORAL at 09:07

## 2017-07-20 RX ADMIN — ENOXAPARIN SODIUM 40 MG: 100 INJECTION SUBCUTANEOUS at 08:07

## 2017-07-20 NOTE — ASSESSMENT & PLAN NOTE
Patient is a 63 yo male with HLD presenting with bilateral PET avid lung masses here for biopsy evaluation POD 1 from right upper lobe VATS wedge resection and bronchoscopy.   Patient is doing well this morning. CT to water seal.     -CT to water seal  -Advance to regular diet  -OOB, Ambulate  -Pulmonary toilet  -Daily CXR  -GI DVT ppx

## 2017-07-20 NOTE — HOSPITAL COURSE
7/19/17 - 1. Flexible Bronchoscopy with Bronchoalveolar Lavage.  2. Right Thoracoscopy with Upper Lobe Wedge Resection. Chest tube to water seal.   7/20/17- POD 1 VATS wedge resection. Weaned to RA. Clamped chest tube in morning. Advanced to regular diet. Repeat CXR without pneumothorax. Chest tube removed. Repeat CXR stable. Ambulating and voiding without difficulty. Vitals remained stable.

## 2017-07-20 NOTE — ANESTHESIA PREPROCEDURE EVALUATION
07/20/2017  Dariusz Urbina is a 62 y.o., male.    Anesthesia Evaluation         Review of Systems  Social:  Non-Smoker   Cardiovascular:  Cardiovascular Normal     Pulmonary:  Pulmonary Normal    Renal/:  Renal/ Normal     Hepatic/GI:  Hepatic/GI Normal    Neurological:  Neurology Normal    Endocrine:  Endocrine Normal        Physical Exam  General:  Well nourished    Airway/Jaw/Neck:  Airway Findings: Mouth Opening: Normal Tongue: Normal  General Airway Assessment: Adult  Mallampati: III  Improves to II with phonation.  TM Distance: Normal, at least 6 cm  Jaw/Neck Findings:  Neck ROM: Extension Decreased, Mild      Dental:  Dental Findings: In tact   Chest/Lungs:  Chest/Lungs Findings: Clear to auscultation         Mental Status:  Mental Status Findings:  Cooperative         Anesthesia Plan  Type of Anesthesia, risks & benefits discussed:  Anesthesia Type:  general  Patient's Preference:   Intra-op Monitoring Plan: standard ASA monitors  Intra-op Monitoring Plan Comments:   Post Op Pain Control Plan: multimodal analgesia  Post Op Pain Control Plan Comments:   Induction:   IV  Beta Blocker:  Patient is not currently on a Beta-Blocker (No further documentation required).       Informed Consent: Patient understands risks and agrees with Anesthesia plan.  Questions answered. Anesthesia consent signed with patient.  ASA Score: 2     Day of Surgery Review of History & Physical:    H&P update referred to the surgeon.         Ready For Surgery From Anesthesia Perspective.

## 2017-07-20 NOTE — OP NOTE
Date of Procedure: 7/19/2017    Pre-operative Diagnosis: Bilateral Lung Nodules    Post-operative Diagnosis: Same    Procedure(s): 1. Flexible Bronchoscopy with Bronchoalveolar Lavage.  2. Right Thoracoscopy with Upper Lobe Wedge Resection    Surgeon: Yuval Rodriguez MD    Assistant(s): Jonathan Schoen, MD    Anesthesia: GETA    Findings: Scant thin, white secretions in left lower lobe basilar segment bronchi.  Large upper lobe nodule with overlying parenchyma adherent to chest wall but no evidence of invasion, completely excised, negative for malignancy on frozen section.    Estimated Blood Loss: 20mL    Drains: 24 Kyrgyz Chest Tube    Specimen(s): LLL BAL for culture and cytology; RUL wedge resection for culture and permanent    Complications: None    Indications for Procedure: 63 yo male found to bilateral lung nodules.  One of the nodules was biopsied and was concerning for fungal pneumonia, but was not definitively diagnostic.  It was decided to proceed with wedge resection for diagnosis.  Risks, benefits and possible outcomes were discussed in detail with the patient, and he  and his family were given the opportunity to ask questions and have those questions answered to their satisfaction.  he desires to proceed and signed consent.    Procedure in Detail: The patient was taken to the operating room and placed supine on the operating table.  Adequate general anesthesia was achieved.  Time-out was performed.  Flexible bronchoscope was passed through the endotracheal tube and the entire airway examined thoroughly.  The amy was sharp and the airways were normal to the subsegmental level without endobronchial lesions or excessive secretions.  Given the presence of the dominant mass in the left lower lobe, bronchoalveolar lavage was performed in the basilar segment bronchus and the effluent sectioned into a Lukens trap.  Scope was removed.  Double lumen endotracheal tube was then placed and its position and  function were confirmed with bronchoscopy.  He was turned to the left lateral decubitus position, padded appropriately and secured.  Right chest was prepped and draped in standard sterile fashion.  Intravenous antibiotics were administered.  Right lung was isolated.  Time-out was performed.  A 1.5cm incision was made in the 8th intercostal space in the posterior axillary line.  Port and camera were inserted.  There was no evidence of extraparenchymal disease.  A 4cm incision was made in the 5th intercostal space in the anterior axillary line.  Subcutaneous tissue was divided with electrocautery and the chest was entered.  Wound protector was placed.  There was a dense inflammatory adhesion between the anterolateral chest wall and the upper lobe that was taken down completely with electrocautery.  Deep to this a large nodule was palpated, corresponding to a large PET-avid nodule seen on imaging.  The nodule was completely resected using EndoGIA 45mm purple loads.  The specimen was placed in an endocatch bag and removed through the utility incision.  It was sent for frozen section, which revealed granulomata without evidence of malignancy.  A portion of the nodule was sent for culture.  The chest was irrigated with one liter of sterile water.  All irrigation was evacuated.  Then, 20mL of 1.3% Exparel was mixed with 10mL normal saline and used for a regional intercostal thoracic block; 3ml were injected into the intercostal spaces from T3-T10 under direct vision after aspiration to ensure extravascular injection.  Hemostasis was excellent.  A 24 Micronesian straight chest tube was passed through the camera port and directed posterior apically.  The lung was inflated under direct vision and filled the chest cavity nicely.  Tube was secured to the skin with Neurolon suture.  Utility incision was closed in layers with absorbable suture.  Steri-strips and sterile dressings were applied.  All sponge, needle and instrument counts  were correct at the end of the case.  The patient tolerated the procedure well.  There were no immediate complications.  He was extubated in the operating room.    Disposition: PACU in stable condition

## 2017-07-20 NOTE — PLAN OF CARE
Problem: Patient Care Overview  Goal: Plan of Care Review  Outcome: Ongoing (interventions implemented as appropriate)  Pt free from falls. Pt wears non slip socks when ambulating. Pt bed low and locked position. Pt afebrile. Pt has received oxycodone PRN for pain. Chest tube to water seal.

## 2017-07-20 NOTE — ANESTHESIA RELEASE NOTE
"Anesthesia Release from PACU Note    Patient: Dariusz Urbina    Procedure(s) Performed: Procedure(s) (LRB):  THORACOSCOPY-VIDEO ASSISTED (VATS) W/WEDGE LUNG RESECTION (Right)  BRONCHOSCOPY-OPERATIVE,FLEXIBLE (N/A)  LAVAGE-ALVEOLAR (N/A)    Anesthesia type: general    Post pain: Adequate analgesia    Post assessment: no apparent anesthetic complications    Last Vitals:   Visit Vitals  /61 (BP Location: Right arm, Patient Position: Lying, BP Method: Automatic)   Pulse 83   Temp 36.4 °C (97.5 °F) (Oral)   Resp 18   Ht 5' 10" (1.778 m)   Wt 78.2 kg (172 lb 6.4 oz)   SpO2 (!) 92%   BMI 24.74 kg/m²       Post vital signs: stable    Level of consciousness: awake, alert  and oriented    Nausea/Vomiting: no nausea/no vomiting    Complications: none    Airway Patency: patent    Respiratory: unassisted    Cardiovascular: stable and blood pressure at baseline    Hydration: euvolemic     "

## 2017-07-20 NOTE — DISCHARGE SUMMARY
[facility]  Thoracic Surgery  Discharge Summary    Patient Name: Dariusz Urbina  MRN: [id number]  Admission Date: 7/19/2017  Hospital Length of Stay: 1 days  Discharge Date and Time:  07/20/2017 4:27 PM  Attending Physician: Yuval Rodriguez MD   Discharging Provider: Dione Romero PA-C  Primary Care Provider: Nils Espino MD    HPI:   Patient is a 61 yo male with HLD presenting with bilateral PET avid lung masses here today for surgical biopsy evaluation. History dates back about 6-7 weeks ago with onset of daily fever up to 101, progressive SOB, generalized weakness, and night sweats. Saw PCP and attributed to URI. After no relief went back to PCP who started him on zpack. CXR revealed bilateral patchy nodules. Subsequent CT significant for LLL mass measuring 9 cm, RUL measuring 3.1cm, RLL measuring 4cm, and RML measuring 3.4cm. IR biopsy of LLL lesion with fibrosis, focal necrosis, negative AFB, and send out negative for fungal DNA. Started on fluconazole. PET scan with bilateral PET avid intrathoracic disease with LLL SUV max 18.2 and RUL SUV max 14.4. Today, continues to report overall fatigue, low grade fever, and occasional SOB/wheezing. Takes ibuprofen daily. Treadmill stress/cardiology w/u April '17 after dizziness and weakness- benign. Prior to onset of malaise daily exercise on elliptical. Denies chills, CP, nausea, vomiting, appetite changes.      Former smoker. Quit 28 years ago. 10 pack year history.  PSH: lumbar sx  Works in commercial real estate.          Procedure(s) (LRB):  1.Flexible Bronchoscopy with Bronchoalveolar Lavage.    2. Right Thoracoscopy with Upper Lobe Wedge Resection     Hospital Course:   7/19/17 - 1. Flexible Bronchoscopy with Bronchoalveolar Lavage.  2. Right Thoracoscopy with Upper Lobe Wedge Resection. CLD. Chest tube to water seal.   7/20/17- POD 1 VATS wedge resection. Weaned to RA. Clamped chest tube in morning. Advanced to regular diet. Repeat  CXR without pneumothorax. Chest tube removed. Repeat CXR stable. Ambulating and voiding without difficulty. Vitals remained stable.    Deemed stable for discharge        Significant Diagnostic Studies: Radiology: X-Ray: CXR: X-Ray Chest 1 View (CXR):   Results for orders placed or performed during the hospital encounter of 07/19/17   X-Ray Chest 1 View    Narrative    Time of Procedure: 07/20/17 15:30:00  Accession # [id number]    Comparison: 7/20/2017, 1003 hrs.  7/11/2017.    Number of views: 1.    Findings:  I detect no convincing evidence of pneumothorax following removal of the RIGHT thoracostomy tube.  Mediastinal structures are midline.    Multifocal patchy subsegmental opacities are present in both lungs, more apparent on the RIGHT, similar to 7/11/2017; please to the report of that chest radiograph for further information.    No new disease detected.    Impression    Please see above.      Electronically signed by: Sharla Urias MD  Date:     07/20/17  Time:    15:51        Pending Diagnostic Studies:     Procedure Component Value Units Date/Time    X-Ray Chest 1 View [[id number]]     Order Status:  Sent Lab Status:  No result         Final Active Diagnoses:    Diagnosis Date Noted POA    PRINCIPAL PROBLEM:  Lung mass [R91.8] 06/16/2017 Yes    Lung nodules [R91.8] 07/19/2017 Yes      Problems Resolved During this Admission:    Diagnosis Date Noted Date Resolved POA      Discharged Condition: good    Disposition: Home or Self Care    Follow Up:  Follow-up Information     Yuval Rodriguez MD On 8/3/2017.    Specialty:  Cardiothoracic Surgery  Contact information:  [address] [place] [id number]  [phone]                 Patient Instructions:     Diet general     Activity as tolerated     Shower on day dressing removed (No bath)     Lifting restrictions   Order Comments: Please do not lift more than 20lbs.     Call MD for:  persistent nausea and vomiting or diarrhea     Call MD for:  temperature  >100.4     Call MD for:  severe uncontrolled pain     Call MD for:  redness, tenderness, or signs of infection (pain, swelling, redness, odor or green/yellow discharge around incision site)     Call MD for:  difficulty breathing or increased cough     Call MD for:  severe persistent headache     Call MD for:  worsening rash     Call MD for:  persistent dizziness, light-headedness, or visual disturbances     Call MD for:  increased confusion or weakness     Remove dressing in 24 hours       Medications:  Reconciled Home Medications:   Current Discharge Medication List      START taking these medications    Details   oxycodone-acetaminophen (PERCOCET) 5-325 mg per tablet Take 1 tablet by mouth every 4 (four) hours as needed for Pain.  Qty: 51 tablet, Refills: 0         CONTINUE these medications which have NOT CHANGED    Details   fluconazole (DIFLUCAN) 200 MG Tab Take 4 tablets (800 mg total) by mouth once daily.  Qty: 120 tablet, Refills: 0    Associated Diagnoses: Lung mass      aspirin (ECOTRIN) 81 MG EC tablet Take 81 mg by mouth every morning.       pravastatin (PRAVACHOL) 80 MG tablet Take 1 tablet (80 mg total) by mouth once daily.  Qty: 90 tablet, Refills: 0      triamcinolone acetonide 0.1% (KENALOG) 0.1 % cream Apply topically 2 (two) times daily.  Qty: 80 g, Refills: 0    Associated Diagnoses: Dermatitis             Dione Romero PA-C  Thoracic Surgery  [facility]

## 2017-07-20 NOTE — SUBJECTIVE & OBJECTIVE
No current facility-administered medications on file prior to encounter.      Current Outpatient Prescriptions on File Prior to Encounter   Medication Sig    fluconazole (DIFLUCAN) 200 MG Tab Take 4 tablets (800 mg total) by mouth once daily.    aspirin (ECOTRIN) 81 MG EC tablet Take 81 mg by mouth every morning.     pravastatin (PRAVACHOL) 80 MG tablet Take 1 tablet (80 mg total) by mouth once daily.    triamcinolone acetonide 0.1% (KENALOG) 0.1 % cream Apply topically 2 (two) times daily.       Review of patient's allergies indicates:  No Known Allergies    Past Medical History:   Diagnosis Date    Decreased libido 11/11/2013    Dermatitis 10/12/2012    Hyperlipidemia 11/11/2013     Past Surgical History:   Procedure Laterality Date    COLONOSCOPY      LUMBAR FUSION      titanium rods    TONSILLECTOMY       Family History     None        Social History Main Topics    Smoking status: Former Smoker     Packs/day: 1.00     Years: 10.00     Quit date: 7/13/1989    Smokeless tobacco: Never Used    Alcohol use No    Drug use: No    Sexual activity: Not on file     Review of Systems  Objective:     Vital Signs (Most Recent):  Temp: 97.5 °F (36.4 °C) (07/20/17 0401)  Pulse: 83 (07/20/17 0401)  Resp: 18 (07/20/17 0401)  BP: 114/61 (07/20/17 0401)  SpO2: (!) 92 % (07/20/17 0401) Vital Signs (24h Range):  Temp:  [97.5 °F (36.4 °C)-98.2 °F (36.8 °C)] 97.5 °F (36.4 °C)  Pulse:  [51-83] 83  Resp:  [7-18] 18  SpO2:  [91 %-99 %] 92 %  BP: (111-153)/(58-83) 114/61     Weight: 78.2 kg (172 lb 6.4 oz)  Body mass index is 24.74 kg/m².  ECOG Performance Status Grade: 0 - Fully Active    Intake/Output - Last 3 Shifts       07/18 0700 - 07/19 0659 07/19 0700 - 07/20 0659 07/20 0700 - 07/21 0659    P.O.  900     I.V. (mL/kg)  1400 (17.9)     IV Piggyback  100     Total Intake(mL/kg)  2400 (30.7)     Urine (mL/kg/hr)  1200     Chest Tube  160     Total Output   1360      Net   +1040                   SpO2: (!) 92 %  O2  Device (Oxygen Therapy): room air    Physical Exam   Constitutional: He is oriented to person, place, and time. He appears well-developed and well-nourished. No distress.   HENT:   Head: Normocephalic and atraumatic.   Eyes: Conjunctivae are normal.   Neck: Normal range of motion. Neck supple.   Cardiovascular: Normal rate, regular rhythm, normal heart sounds and intact distal pulses.  Exam reveals no gallop and no friction rub.    No murmur heard.  Pulmonary/Chest: Effort normal.   Chest tube in place on right chest. Incisions CDI. Small air leak.   Abdominal: Soft. Bowel sounds are normal.   Musculoskeletal: Normal range of motion.   Neurological: He is alert and oriented to person, place, and time.   Skin: Skin is warm and dry. He is not diaphoretic.   Psychiatric: He has a normal mood and affect. His behavior is normal. Judgment and thought content normal.       Significant Labs:  BMP: No results for input(s): GLU, NA, K, CL, CO2, BUN, CREATININE, CALCIUM, MG in the last 48 hours.  CBC: No results for input(s): WBC, RBC, HGB, HCT, PLT, MCV, MCH, MCHC in the last 48 hours.    Significant Diagnostics:  I have reviewed and interpreted all pertinent imaging results/findings within the past 24 hours.

## 2017-07-20 NOTE — NURSING
Discharge instructions discussed with pt. Verbalizes understanding. Paperwork and prescription given to pt. Medications reconciled. IV's D/C'd, catheters intact. Tolerated well. Pt informed that showering is okay, but to not submerge incision. Instructed against strenuous lifting. Vital signs stable. No acute distress noted.

## 2017-07-20 NOTE — HPI
Patient is a 61 yo male with HLD presenting with bilateral PET avid lung masses here today for surgical biopsy evaluation. History dates back about 6-7 weeks ago with onset of daily fever up to 101, progressive SOB, generalized weakness, and night sweats. Saw PCP and attributed to URI. After no relief went back to PCP who started him on zpack. CXR revealed bilateral patchy nodules. Subsequent CT significant for LLL mass measuring 9 cm, RUL measuring 3.1cm, RLL measuring 4cm, and RML measuring 3.4cm. IR biopsy of LLL lesion with fibrosis, focal necrosis, negative AFB, and send out negative for fungal DNA. Started on fluconazole. PET scan with bilateral PET avid intrathoracic disease with LLL SUV max 18.2 and RUL SUV max 14.4. Today, continues to report overall fatigue, low grade fever, and occasional SOB/wheezing. Takes ibuprofen daily. Treadmill stress/cardiology w/u April '17 after dizziness and weakness- benign. Prior to onset of malaise daily exercise on elliptical. Denies chills, CP, nausea, vomiting, appetite changes.      Former smoker. Quit 28 years ago. 10 pack year history.  PSH: lumbar sx  Works in commercial real estate.

## 2017-07-20 NOTE — ANESTHESIA POSTPROCEDURE EVALUATION
"Anesthesia Post Evaluation    Patient: Dariusz Urbina    Procedure(s) Performed: Procedure(s) (LRB):  THORACOSCOPY-VIDEO ASSISTED (VATS) W/WEDGE LUNG RESECTION (Right)  BRONCHOSCOPY-OPERATIVE,FLEXIBLE (N/A)  LAVAGE-ALVEOLAR (N/A)    Final Anesthesia Type: general  Patient location during evaluation: PACU  Patient participation: Yes- Able to Participate  Level of consciousness: awake and alert  Post-procedure vital signs: reviewed and stable  Pain management: adequate  Airway patency: patent  PONV status at discharge: No PONV  Anesthetic complications: no      Cardiovascular status: blood pressure returned to baseline  Respiratory status: unassisted  Hydration status: euvolemic  Follow-up not needed.        Visit Vitals  /61 (BP Location: Right arm, Patient Position: Lying, BP Method: Automatic)   Pulse 83   Temp 36.4 °C (97.5 °F) (Oral)   Resp 18   Ht 5' 10" (1.778 m)   Wt 78.2 kg (172 lb 6.4 oz)   SpO2 (!) 92%   BMI 24.74 kg/m²       Pain/Kourtney Score: Pain Assessment Performed: Yes (7/19/2017  7:40 PM)  Presence of Pain: denies (7/19/2017  6:00 PM)  Pain Rating Prior to Med Admin: 8 (7/19/2017 11:42 PM)  Kourtney Score: 9 (7/19/2017  4:00 PM)      "

## 2017-07-20 NOTE — PROGRESS NOTES
[facility]  General Surgery  Progress Note    Subjective:     History of Present Illness:  Patient is a 61 yo male with HLD presenting with bilateral PET avid lung masses here today for surgical biopsy evaluation. History dates back about 6-7 weeks ago with onset of daily fever up to 101, progressive SOB, generalized weakness, and night sweats. Saw PCP and attributed to URI. After no relief went back to PCP who started him on zpack. CXR revealed bilateral patchy nodules. Subsequent CT significant for LLL mass measuring 9 cm, RUL measuring 3.1cm, RLL measuring 4cm, and RML measuring 3.4cm. IR biopsy of LLL lesion with fibrosis, focal necrosis, negative AFB, and send out negative for fungal DNA. Started on fluconazole. PET scan with bilateral PET avid intrathoracic disease with LLL SUV max 18.2 and RUL SUV max 14.4. Today, continues to report overall fatigue, low grade fever, and occasional SOB/wheezing. Takes ibuprofen daily. Treadmill stress/cardiology w/u April '17 after dizziness and weakness- benign. Prior to onset of malaise daily exercise on elliptical. Denies chills, CP, nausea, vomiting, appetite changes.      Former smoker. Quit 28 years ago. 10 pack year history.  PSH: lumbar sx  Works in commercial real estate.          Post-Op Info:  Procedure(s) (LRB):  THORACOSCOPY-VIDEO ASSISTED (VATS) W/WEDGE LUNG RESECTION (Right)  BRONCHOSCOPY-OPERATIVE,FLEXIBLE (N/A)  LAVAGE-ALVEOLAR (N/A)   1 Day Post-Op     Interval History: No acute events overnight. CT to water seal, small air leak. Patient tolerating full liquid diet. Pain well controlled. Voiding spontaneously.     No current facility-administered medications on file prior to encounter.      Current Outpatient Prescriptions on File Prior to Encounter   Medication Sig    fluconazole (DIFLUCAN) 200 MG Tab Take 4 tablets (800 mg total) by mouth once daily.    aspirin (ECOTRIN) 81 MG EC tablet Take 81 mg by mouth every morning.      pravastatin (PRAVACHOL) 80 MG tablet Take 1 tablet (80 mg total) by mouth once daily.    triamcinolone acetonide 0.1% (KENALOG) 0.1 % cream Apply topically 2 (two) times daily.       Review of patient's allergies indicates:  No Known Allergies    Past Medical History:   Diagnosis Date    Decreased libido 11/11/2013    Dermatitis 10/12/2012    Hyperlipidemia 11/11/2013     Past Surgical History:   Procedure Laterality Date    COLONOSCOPY      LUMBAR FUSION      titanium rods    TONSILLECTOMY       Family History     None        Social History Main Topics    Smoking status: Former Smoker     Packs/day: 1.00     Years: 10.00     Quit date: 7/13/1989    Smokeless tobacco: Never Used    Alcohol use No    Drug use: No    Sexual activity: Not on file     Review of Systems  Objective:     Vital Signs (Most Recent):  Temp: 97.5 °F (36.4 °C) (07/20/17 0401)  Pulse: 83 (07/20/17 0401)  Resp: 18 (07/20/17 0401)  BP: 114/61 (07/20/17 0401)  SpO2: (!) 92 % (07/20/17 0401) Vital Signs (24h Range):  Temp:  [97.5 °F (36.4 °C)-98.2 °F (36.8 °C)] 97.5 °F (36.4 °C)  Pulse:  [51-83] 83  Resp:  [7-18] 18  SpO2:  [91 %-99 %] 92 %  BP: (111-153)/(58-83) 114/61     Weight: 78.2 kg (172 lb 6.4 oz)  Body mass index is 24.74 kg/m².  ECOG Performance Status Grade: 0 - Fully Active    Intake/Output - Last 3 Shifts       07/18 0700 - 07/19 0659 07/19 0700 - 07/20 0659 07/20 0700 - 07/21 0659    P.O.  900     I.V. (mL/kg)  1400 (17.9)     IV Piggyback  100     Total Intake(mL/kg)  2400 (30.7)     Urine (mL/kg/hr)  1200     Chest Tube  160     Total Output   1360      Net   +1040                   SpO2: (!) 92 %  O2 Device (Oxygen Therapy): room air    Physical Exam   Constitutional: He is oriented to person, place, and time. He appears well-developed and well-nourished. No distress.   HENT:   Head: Normocephalic and atraumatic.   Eyes: Conjunctivae are normal.   Neck: Normal range of motion. Neck supple.   Cardiovascular: Normal rate,  regular rhythm, normal heart sounds and intact distal pulses.  Exam reveals no gallop and no friction rub.    No murmur heard.  Pulmonary/Chest: Effort normal.   Chest tube in place on right chest. Incisions CDI. Small air leak.   Abdominal: Soft. Bowel sounds are normal.   Musculoskeletal: Normal range of motion.   Neurological: He is alert and oriented to person, place, and time.   Skin: Skin is warm and dry. He is not diaphoretic.   Psychiatric: He has a normal mood and affect. His behavior is normal. Judgment and thought content normal.       Significant Labs:  BMP: No results for input(s): GLU, NA, K, CL, CO2, BUN, CREATININE, CALCIUM, MG in the last 48 hours.  CBC: No results for input(s): WBC, RBC, HGB, HCT, PLT, MCV, MCH, MCHC in the last 48 hours.    Significant Diagnostics:  I have reviewed and interpreted all pertinent imaging results/findings within the past 24 hours.    Assessment/Plan:     * Lung mass    Patient is a 61 yo male with HLD presenting with bilateral PET avid lung masses here for biopsy evaluation POD 1 from right upper lobe VATS wedge resection and bronchoscopy.   Patient is doing well this morning. CT to water seal.     -CT to water seal  -Advance to regular diet  -OOB, Ambulate  -Pulmonary toilet  -Daily CXR  -GI DVT ppx          VTE Risk Mitigation         Ordered     enoxaparin injection 40 mg  Every 24 hours (non-standard times)     Route:  Subcutaneous        07/19/17 1335     Medium Risk of VTE  Once      07/19/17 1308     Place sequential compression device  Until discontinued      07/19/17 1308     Place VIBHA hose  Until discontinued      07/19/17 1308          Chalo Marcelino MD  General Surgery  [facility]

## 2017-07-20 NOTE — PLAN OF CARE
07/20/17 1240   Discharge Assessment   Assessment Type Discharge Planning Assessment   Confirmed/corrected address and phone number on facesheet? Yes   Assessment information obtained from? Patient   Expected Length of Stay (days) 2   Prior to hospitilization cognitive status: Alert/Oriented   Prior to hospitalization functional status: Independent   Current cognitive status: Alert/Oriented   Current Functional Status: Independent   Arrived From home or self-care   Lives With spouse   Able to Return to Prior Arrangements yes   Is patient able to care for self after discharge? Yes   How many people do you have in your home that can help with your care after discharge? 1   Patient's perception of discharge disposition home or selfcare   Readmission Within The Last 30 Days no previous admission in last 30 days   Patient currently being followed by outpatient case management? No   Patient currently receives home health services? No   Does the patient currently use HME? No   Patient currently receives private duty nursing? No   Patient currently receives any other outside agency services? No   Equipment Currently Used at Home none   Do you have any problems affording any of your prescribed medications? No   Is the patient taking medications as prescribed? yes   Do you have any financial concerns preventing you from receiving the healthcare you need? No   Does the patient have transportation to healthcare appointments? Yes   Transportation Available family or friend will provide   On Dialysis? No   Does the patient receive services at the Coumadin Clinic? No   Are there any open cases? No   Discharge Plan A Home with family   Discharge Plan B Home Health   Patient/Family In Agreement With Plan yes     Patient awake & alert in bed when CM rounded. No family present. Patient was admitted with a lung mass. PT POD 1 from right upper lobe VATS wedge resection and bronchoscopy. Chest tube to water seal. Patient lives with  his spouse, Chinmay Urbina ([phone]), & is independent of all ADLs. Plan to discharge patient home with support or home with home health when medically stable. Patient denied the need for assistance with transportation at time of discharge. Will continue to follow.

## 2017-07-20 NOTE — PLAN OF CARE
Problem: Patient Care Overview  Goal: Plan of Care Review  Outcome: Ongoing (interventions implemented as appropriate)  Plan of care discussed with pt. Pt verbalizes understanding. Pt tolerating clear liquid diet with no complaints of discomfort or nausea. Pain managed with PRN pain medication. R CT to water seal. Pt positions independently. Vital signs stable on 2L NC. Pt remains free of falls and injury. Will continue to monitor.

## 2017-07-21 ENCOUNTER — TELEPHONE (OUTPATIENT)
Dept: CARDIOTHORACIC SURGERY | Facility: CLINIC | Age: 62
End: 2017-07-21

## 2017-07-21 NOTE — TELEPHONE ENCOUNTER
Called to check in on the pt, he is recovering well at home and denies any issues. Pain is well controlled and he reports only soreness to the incision area. He is understanding of shower instructions with antibacterial soap and when to remove his bandages. He is using his IS every couple of hours and feels well. He is agreeable to f/u on 8/3 with CXR prior. Messages sent to Dr. Barajas and Dr. Yao as his f/u and treatment will be with both providers. No appointments made at this time, patient understands to reach out to both physicians to set up f/u appts once pathology is received. Pt understands that if his pathology is sent out, his appt may need to be pushed back.   He has our contact information to call with any needs/concerns.

## 2017-07-22 LAB — BACTERIA SPEC AEROBE CULT: NORMAL

## 2017-07-24 ENCOUNTER — PATIENT OUTREACH (OUTPATIENT)
Dept: ADMINISTRATIVE | Facility: CLINIC | Age: 62
End: 2017-07-24

## 2017-07-24 LAB
BACTERIA SPEC AEROBE CULT: NO GROWTH
BACTERIA SPEC ANAEROBE CULT: NORMAL
BACTERIA SPEC ANAEROBE CULT: NORMAL

## 2017-07-24 RX ORDER — FLUCONAZOLE 100 MG/1
100 TABLET ORAL DAILY
COMMUNITY
End: 2017-07-25 | Stop reason: SDUPTHER

## 2017-07-24 NOTE — PATIENT INSTRUCTIONS
After a Thoracoscopy  After surgery, you will wake up in a recovery area. At first youll likely feel groggy and thirsty. An IV (intravenous) line gives you fluids and medicines to relieve pain. Monitors will keep track of your breathing and heartbeat. You may have a chest tube coming out of your chest to drain fluid or air. This may be taken out just after surgery. Or it may stay in place for several days.    Recovering in the hospital  While in the hospital, youll work with a respiratory therapist. He or she will teach you breathing exercises. These help keep your lungs clear and prevent inflammation. Youll do these exercises every hour or so. Depending on your condition, a nurse or therapist will help you get up and walk soon after your surgery. This keeps your blood moving and will help improve your healing. The hospital stay after your surgery will be 1-4 days. If you have chest tubes, you wont go home until theyre removed.  Recovering at home  At home, follow the instructions you are given about how to care for your incisions and lungs. This may include:  · Take your pain medicines as prescribed. This helps relieve soreness and makes activity and deep breathing easier.  · Walk to keep your blood moving and strengthen your muscles. But avoid strenuous activity, heavy lifting, and driving for a few weeks.  · Continue to do the breathing exercises taught to you by your therapist.  · Resume sexual relations when you feel ready.  · Ask your doctor when you can go back to work.  · Follow up with your doctor. He or she will monitor your healing and discuss the results of the procedure.  When to call your doctor  Call your doctor if you have any of the following symptoms after your procedure:  · Shortness of breath  · Dizziness or lightheadedness that continues even after sitting down  · Very red or draining incision  · Sudden, sharp chest pain that does not go away  · Fever of 100.4°F (38°C) or higher, or as  directed by your healthcare provider  · Coughing up bright red blood    Date Last Reviewed: 10/1/2016  © 2291-2542 [employer]. [address] [place] [address], [place] [address]. All rights reserved. This information is not intended as a substitute for professional medical care. Always follow your healthcare professional's instructions.

## 2017-07-25 ENCOUNTER — PATIENT MESSAGE (OUTPATIENT)
Dept: INFECTIOUS DISEASES | Facility: CLINIC | Age: 62
End: 2017-07-25

## 2017-07-25 RX ORDER — FLUCONAZOLE 200 MG/1
800 TABLET ORAL DAILY
Qty: 120 TABLET | Refills: 1 | Status: SHIPPED | OUTPATIENT
Start: 2017-07-25 | End: 2017-09-19 | Stop reason: SDUPTHER

## 2017-07-27 ENCOUNTER — PATIENT MESSAGE (OUTPATIENT)
Dept: CARDIOTHORACIC SURGERY | Facility: CLINIC | Age: 62
End: 2017-07-27

## 2017-07-28 ENCOUNTER — TELEPHONE (OUTPATIENT)
Dept: CARDIOTHORACIC SURGERY | Facility: HOSPITAL | Age: 62
End: 2017-07-28

## 2017-07-30 ENCOUNTER — PATIENT MESSAGE (OUTPATIENT)
Dept: CARDIOTHORACIC SURGERY | Facility: CLINIC | Age: 62
End: 2017-07-30

## 2017-07-30 ENCOUNTER — OFFICE VISIT (OUTPATIENT)
Dept: FAMILY MEDICINE | Facility: CLINIC | Age: 62
End: 2017-07-30
Payer: COMMERCIAL

## 2017-07-30 VITALS
WEIGHT: 172.38 LBS | SYSTOLIC BLOOD PRESSURE: 131 MMHG | TEMPERATURE: 98 F | DIASTOLIC BLOOD PRESSURE: 75 MMHG | HEIGHT: 70 IN | HEART RATE: 99 BPM | BODY MASS INDEX: 24.68 KG/M2

## 2017-07-30 DIAGNOSIS — L03.313 CELLULITIS OF CHEST WALL: Primary | ICD-10-CM

## 2017-07-30 PROCEDURE — 99214 OFFICE O/P EST MOD 30 MIN: CPT | Mod: 25,24,S$GLB, | Performed by: FAMILY MEDICINE

## 2017-07-30 PROCEDURE — 99999 PR PBB SHADOW E&M-EST. PATIENT-LVL III: CPT | Mod: PBBFAC,,,

## 2017-07-30 RX ORDER — SULFAMETHOXAZOLE AND TRIMETHOPRIM 800; 160 MG/1; MG/1
1 TABLET ORAL 2 TIMES DAILY
Qty: 20 TABLET | Refills: 0 | Status: SHIPPED | OUTPATIENT
Start: 2017-07-30 | End: 2017-08-09

## 2017-07-30 NOTE — PROGRESS NOTES
"Routine Office Visit    Patient Name: Dariusz Urbina    : 1955  MRN: [id number]    Subjective:  Dariusz is a 62 y.o. male who presents today for:    1. Redness on right chest  Patient presenting today with three days of redness on right chest after having biopsy done.  He did not call to notify the physician that he was having redness.  There is no pain, but he was concerned due to the increased redness.  He has also noticed a rash on the right pectoral muscle that has not spread over the past 2 days.  The rash causes the skin to feel irritated, but not "severely painful".  There has been no drainage from biopsy site.  No fevers, chills, n/v/d.      Past Medical History  Past Medical History:   Diagnosis Date    Decreased libido 2013    Dermatitis 10/12/2012    Hyperlipidemia 2013       Past Surgical History  Past Surgical History:   Procedure Laterality Date    COLONOSCOPY      LUMBAR FUSION      titanium rods    TONSILLECTOMY         Family History  History reviewed. No pertinent family history.    Social History  Social History     Social History    Marital status:      Spouse name: N/A    Number of children: N/A    Years of education: N/A     Occupational History    Not on file.     Social History Main Topics    Smoking status: Former Smoker     Packs/day: 1.00     Years: 10.00     Quit date: 1989    Smokeless tobacco: Never Used    Alcohol use No    Drug use: No    Sexual activity: Not on file     Other Topics Concern    Not on file     Social History Narrative    No narrative on file       Current Medications  Current Outpatient Prescriptions on File Prior to Visit   Medication Sig Dispense Refill    fluconazole (DIFLUCAN) 200 MG Tab Take 4 tablets (800 mg total) by mouth once daily. 120 tablet 1    aspirin (ECOTRIN) 81 MG EC tablet Take 81 mg by mouth every morning.       oxycodone-acetaminophen (PERCOCET) 5-325 mg per tablet Take 1 tablet by mouth every 4 (four) " "hours as needed for Pain. 51 tablet 0    pravastatin (PRAVACHOL) 80 MG tablet Take 1 tablet (80 mg total) by mouth once daily. 90 tablet 0    triamcinolone acetonide 0.1% (KENALOG) 0.1 % cream Apply topically 2 (two) times daily. 80 g 0     No current facility-administered medications on file prior to visit.        Allergies   Review of patient's allergies indicates:  No Known Allergies    Review of Systems (Pertinent positives)  Review of Systems   Constitutional: Negative.    HENT: Negative.    Eyes: Negative.    Respiratory: Negative.    Cardiovascular: Negative.    Gastrointestinal: Negative.    Skin: Positive for rash. Negative for itching.         /75   Pulse 99   Temp 98 °F (36.7 °C)   Ht 5' 10" (1.778 m)   Wt 78.2 kg (172 lb 6.4 oz)   BMI 24.74 kg/m²     GENERAL APPEARANCE: in no apparent distress and well developed and well nourished  HEENT: PERRL, EOMI, Sclera clear, anicteric, Oropharynx clear, no lesions, Neck supple with midline trachea  NECK: normal, supple, no adenopathy, thyroid normal in size  RESPIRATORY: appears well, vitals normal, no respiratory distress, acyanotic, normal RR, chest clear, no wheezing, crepitations, rhonchi, normal symmetric air entry  HEART: regular rate and rhythm, S1, S2 normal, no murmur, click, rub or gallop.    ABDOMEN: abdomen is soft without tenderness, no masses, no hernias, no organomegaly, no rebound, no guarding. Suprapubic tenderness absent. No CVA tenderness.  SKIN: erythema around biopsy site, but no fluctance.  Papular rash noted to right pectoral with some sensitivity on palpation  PSYCH: Alert, oriented x 3, thought content appropriate, speech normal, pleasant and cooperative, good eye contact, well groomed  Assessment/Plan:  Dariusz Urbina is a 62 y.o. male who presents today for :    Dariusz was seen today for redness at biopsy site.    Diagnoses and all orders for this visit:    Cellulitis of chest wall  -     sulfamethoxazole-trimethoprim " 800-160mg (BACTRIM DS) 800-160 mg Tab; Take 1 tablet by mouth 2 (two) times daily.      1.  Will treat with bactrim for possible staph infection  2.  He is to call physicians office that did biopsy tomorrow to notify of possible infection  3.  Follow up with ID as scheduled  4.  He is to call with any concerns      Carlos Jacinto MD

## 2017-07-31 ENCOUNTER — PATIENT MESSAGE (OUTPATIENT)
Dept: CARDIOTHORACIC SURGERY | Facility: CLINIC | Age: 62
End: 2017-07-31

## 2017-08-01 ENCOUNTER — TELEPHONE (OUTPATIENT)
Dept: INFECTIOUS DISEASES | Facility: CLINIC | Age: 62
End: 2017-08-01

## 2017-08-01 NOTE — TELEPHONE ENCOUNTER
Patient called several times and reported being short of breath and needed earlier appointment although he canceled this week's apt.  Call was returned and patient advised to go to ED if he was short of breath and actually communicated to us that he wanted to see us after his apt on Thursday.

## 2017-08-01 NOTE — TELEPHONE ENCOUNTER
----- Message from Shayna Quinonez MA sent at 8/1/2017  2:06 PM CDT -----  Contact: self  / [phone]      ----- Message -----  From: Tarsha Goldsmith  Sent: 8/1/2017  10:30 AM  To: [facility] Staff    Arvind  -  Pt called stating that he is having problems with his lungs and needs to speak with the Dr. Or would like an appt to seethe  on August 3  Thanks,

## 2017-08-02 ENCOUNTER — HOSPITAL ENCOUNTER (OUTPATIENT)
Dept: RADIOLOGY | Facility: HOSPITAL | Age: 62
Discharge: HOME | End: 2017-08-02
Attending: THORACIC SURGERY (CARDIOTHORACIC VASCULAR SURGERY)
Payer: COMMERCIAL

## 2017-08-02 ENCOUNTER — OFFICE VISIT (OUTPATIENT)
Dept: CARDIOTHORACIC SURGERY | Facility: CLINIC | Age: 62
End: 2017-08-02
Payer: COMMERCIAL

## 2017-08-02 VITALS
SYSTOLIC BLOOD PRESSURE: 140 MMHG | OXYGEN SATURATION: 97 % | BODY MASS INDEX: 24.34 KG/M2 | HEART RATE: 85 BPM | DIASTOLIC BLOOD PRESSURE: 87 MMHG | HEIGHT: 70 IN | WEIGHT: 170 LBS

## 2017-08-02 DIAGNOSIS — R91.8 GROUND GLASS OPACITY PRESENT ON IMAGING OF LUNG: Primary | ICD-10-CM

## 2017-08-02 DIAGNOSIS — R91.8 LUNG MASS: ICD-10-CM

## 2017-08-02 PROCEDURE — 99999 PR PBB SHADOW E&M-EST. PATIENT-LVL III: CPT | Mod: PBBFAC,,, | Performed by: PHYSICIAN ASSISTANT

## 2017-08-02 PROCEDURE — 99024 POSTOP FOLLOW-UP VISIT: CPT | Mod: S$GLB,,, | Performed by: PHYSICIAN ASSISTANT

## 2017-08-02 PROCEDURE — 71020 XR CHEST PA AND LATERAL: CPT | Mod: 26,,, | Performed by: RADIOLOGY

## 2017-08-02 PROCEDURE — 71020 XR CHEST PA AND LATERAL: CPT | Mod: TC

## 2017-08-02 NOTE — PHYSICIAN QUERY
PT Name: Dariusz Urbina  MR #: [id number]    Physician Query Form - Pathology Findings Clarification     CDS/: Bree Bautista               Contact information: [email or web address]    This form is a permanent document in the medical record.     Query Date: August 2, 2017      By submitting this query, we are merely seeking further clarification of documentation.  Please utilize your independent clinical judgment when addressing the question(s) below.      The medical record contains the following:     Findings Supporting Clinical Information Location in Medical Record   Lung Mass FINAL PATHOLOGIC DIAGNOSIS  LUNG, RIGHT UPPER LOBE (WEDGE RESECTION):  Negative for neoplasm  Granulomatous inflammation with central geographic necrosis  No acid fast organisms (AFB stain)  No polarizable material  GMS stain for fungus pending repeat Path report     Please document the clinical significance of the Pathologists findings of _granulomatous Inflammation with central necrosis__.          [X] I agree with the Pathology Findings        [  ] I do not agree with the Pathology Findings        [  ] Clinically Insignificant        [  ] Clinically Undetermined        [  ] Other/Clarification of Findings: ______________________________________________    Please document in your progress notes daily for the duration of treatment until resolved and include in your discharge summary.

## 2017-08-02 NOTE — PROGRESS NOTES
Subjective:       Patient ID: Dariusz Urbina is a 62 y.o. male.    Chief Complaint: No chief complaint on file.    Diagnosis: GGO     Pre-operative therapy: N/A    Procedure(s) and date(s): 7/19/17-  1. Flexible Bronchoscopy with Bronchoalveolar Lavage.  2. Right Thoracoscopy with Upper Lobe Wedge Resection    Pathology: Negative for neoplasm. Granulomatous inflammation with central geographic necrosis     Post-operative therapy: N/A    HPI   61 yo male with PMH of HLD and bilateral PET avid lung nodules returns for follow up today s/p right VATS wedge. Pathology negative for malignancy, additional fungal stains negative as well. Today he reports feeling well. Reports pain at chest tube incision site. He was seen by PCP for chest tube site irritation. Stated on Bactrim. Reports cough is at baseline since onset of disease process. Denies fever, chills, night sweats, weight loss, SOB, CP or changes in bowel and bladder functioning.     Review of Systems   Constitutional: Negative for activity change, appetite change, fatigue, fever and unexpected weight change.   HENT: Positive for congestion. Negative for trouble swallowing and voice change.    Eyes: Negative.    Respiratory: Positive for cough and chest tightness. Negative for shortness of breath and wheezing.    Cardiovascular: Negative.    Gastrointestinal: Negative.    Endocrine: Negative.    Genitourinary: Negative.    Musculoskeletal: Negative.    Skin: Negative.    Allergic/Immunologic: Negative.    Neurological: Negative.    Hematological: Negative.    Psychiatric/Behavioral: Negative.        Objective:      Physical Exam   Constitutional: He is oriented to person, place, and time. He appears well-developed and well-nourished.   Cardiovascular: Normal rate, regular rhythm, normal heart sounds and intact distal pulses.    Pulmonary/Chest: Effort normal and breath sounds normal. He has no wheezes. He exhibits tenderness.   Chest tube site erythematous. No  drainage, induration or warmth.    Abdominal: Soft. Bowel sounds are normal. He exhibits no distension. There is no tenderness.   Musculoskeletal: Normal range of motion. He exhibits no edema.   Neurological: He is alert and oriented to person, place, and time.   Skin: Skin is warm and dry.   Psychiatric: He has a normal mood and affect.   Vitals reviewed.      8/2/17 CXR:   Multiple patchy subsegmental opacities scattered throughout both lungs, not significantly changed when compared to prior exam.  Improved aeration of the right costophrenic angle.  No cardiomegaly.  Calcification of the aortic arch.  Osseous structures demonstrate no significant abnormalities.    FINAL PATHOLOGIC DIAGNOSIS  LUNG, RIGHT UPPER LOBE (WEDGE RESECTION):  Negative for neoplasm  Granulomatous inflammation with central geographic necrosis  No acid fast organisms (AFB stain)  No polarizable material  GMS stain for fungus negative.   In addition, to clarify, sections show a central confluent zone of necrosis with surrounding  granulomatous inflammation. In non-necrotic areas, the granulomas are well formed, non-caseating and with giant  cells.    Assessment:       63 yo male with PMH of HLD and bilateral PET avid lung nodules returns for follow up today s/p right VATS wedge.    Plan:       Long discussion with patient and his wife to review OR pathology. They are understandably frustrated with the unknown etiology of bilateral lung opacities.   He will finish the Bactrim course. Suture removed in clinic today which I expect was the source of incisional erythema and pain.   Recommend follow up with Infectious Disease and Pulmonology to further work up and treatment.   No further follow up with Thoracic Surgery required at this time.     Adrianna Gonzalez PA-C  Thoracic Surgery  [id number]

## 2017-08-03 ENCOUNTER — TELEPHONE (OUTPATIENT)
Dept: INFECTIOUS DISEASES | Facility: CLINIC | Age: 62
End: 2017-08-03

## 2017-08-03 ENCOUNTER — OFFICE VISIT (OUTPATIENT)
Dept: INFECTIOUS DISEASES | Facility: CLINIC | Age: 62
End: 2017-08-03
Payer: COMMERCIAL

## 2017-08-03 VITALS
TEMPERATURE: 98 F | SYSTOLIC BLOOD PRESSURE: 143 MMHG | WEIGHT: 171.5 LBS | DIASTOLIC BLOOD PRESSURE: 86 MMHG | HEART RATE: 97 BPM | HEIGHT: 70 IN | BODY MASS INDEX: 24.55 KG/M2

## 2017-08-03 DIAGNOSIS — J18.9 LUNG INFECTION: Primary | ICD-10-CM

## 2017-08-03 DIAGNOSIS — R91.8 LUNG MASS: Primary | ICD-10-CM

## 2017-08-03 DIAGNOSIS — R91.8 LUNG NODULES: ICD-10-CM

## 2017-08-03 PROCEDURE — 99214 OFFICE O/P EST MOD 30 MIN: CPT | Mod: S$GLB,,, | Performed by: INTERNAL MEDICINE

## 2017-08-03 PROCEDURE — 99999 PR PBB SHADOW E&M-EST. PATIENT-LVL III: CPT | Mod: PBBFAC,,, | Performed by: INTERNAL MEDICINE

## 2017-08-03 NOTE — PROGRESS NOTES
Subjective:      Patient ID: Dariusz Urbina is a 62 y.o. male.    Chief Complaint:Follow-up      History of Present Illness    Patient seen initially with pulmonary process suspected to be malignancy; however, it was granulomatous and with fungal organisms present.  However, all fungal serologies are negative thus far.    Molecular sequencing was ordered and was reported negative.    Patient was started empirically on 800 mg fluconazole. Tolerating well.   In view of above, he underwent, bronchoscopy and and a right lobe wedge resection.    Path has shown:  FINAL PATHOLOGIC DIAGNOSIS  LUNG, RIGHT UPPER LOBE (WEDGE RESECTION):  Negative for neoplasm  Granulomatous inflammation with central geographic necrosis  No acid fast organisms (AFB stain)  No polarizable material  GMS stain x 2 - negative    Fungal Serologies repeated and negative,    Microbiology:  Anaerobic cx - Finegoldia magna  And Actinomyces odontolyticus.     Still short of breath.      Review of Systems   Constitution: Negative for chills, decreased appetite, fever, weakness, malaise/fatigue, night sweats, weight gain and weight loss.   HENT: Negative for congestion, ear pain, headaches, hearing loss, hoarse voice, sore throat and tinnitus.    Eyes: Negative for blurred vision, redness and visual disturbance.   Cardiovascular: Negative for chest pain, leg swelling and palpitations.   Respiratory: Negative for cough, hemoptysis, shortness of breath and sputum production.    Hematologic/Lymphatic: Negative for adenopathy. Does not bruise/bleed easily.   Skin: Negative for dry skin, itching, rash and suspicious lesions.   Musculoskeletal: Negative for back pain, joint pain, myalgias and neck pain.   Gastrointestinal: Negative for abdominal pain, constipation, diarrhea, heartburn, nausea and vomiting.   Genitourinary: Negative for dysuria, flank pain, frequency, hematuria, hesitancy and urgency.   Neurological: Negative for dizziness, numbness and  paresthesias.   Psychiatric/Behavioral: Negative for depression and memory loss. The patient does not have insomnia and is not nervous/anxious.      Objective:   Physical Exam   Constitutional: He is oriented to person, place, and time. He appears well-developed and well-nourished.   HENT:   Head: Normocephalic and atraumatic.   Eyes: Conjunctivae and EOM are normal. Pupils are equal, round, and reactive to light.   Neck: Normal range of motion. Neck supple. No thyromegaly present.   Cardiovascular: Normal rate, regular rhythm and normal heart sounds.    No murmur heard.  Pulmonary/Chest: Effort normal. He has no wheezes. He has rales.   Abdominal: Soft. Bowel sounds are normal. He exhibits no mass. There is no tenderness. There is no rebound.   Lymphadenopathy:     He has no cervical adenopathy.   Neurological: He is alert and oriented to person, place, and time.   Skin: Skin is warm and dry.   Vitals reviewed.    Assessment:       1. Lung mass    2. Lung nodules          Plan:       1. Still unclear as to why there were some yeast forms in the initial sample.  PCR was negative.  2. Now with anaerobes of which Finegoldia is likely a colonizer; but Actinomyces can't be ignored as it can present with granulomas; although would expect some abscess formation.  Patient reports that he has chest x-rays dating back to April when he was seen at [facility] ED after a dizzy spell, and these radiographs are abnormal. He is bringing those films and the report.  3. In view of the above and until fungal cultures are final, would continue fluconazole; but will add IV antibiotics to cover for Actino - amp/sulbactam and may actually do penicillin (24 Mu).    4. Spoke with pathology, Dr. Contreras and she will run additional stains to see if actino present as it was not seen with AFB.    5. Will set up follow up after the above.

## 2017-08-04 ENCOUNTER — TELEPHONE (OUTPATIENT)
Dept: INFECTIOUS DISEASES | Facility: CLINIC | Age: 62
End: 2017-08-04

## 2017-08-04 NOTE — TELEPHONE ENCOUNTER
----- Message from Fern Dahl sent at 8/4/2017  1:27 PM CDT -----  Contact: self  Pt is returning a call he missed.    Pt can be reached at [phone].    Thank you.

## 2017-08-04 NOTE — TELEPHONE ENCOUNTER
Spoke with patient - he will be here Tuesday for picc placement at 8am and then come down to our office

## 2017-08-04 NOTE — TELEPHONE ENCOUNTER
Tried to contact patient to inform he of date and time for picc placement, to come to our office for first dosing and to let him know CPP will contact him.  Patient's mail box is full. Can't leave a message.

## 2017-08-08 ENCOUNTER — HOSPITAL ENCOUNTER (OUTPATIENT)
Dept: INTERVENTIONAL RADIOLOGY/VASCULAR | Facility: HOSPITAL | Age: 62
Discharge: HOME | End: 2017-08-08
Attending: INTERNAL MEDICINE
Payer: COMMERCIAL

## 2017-08-08 DIAGNOSIS — J18.9 LUNG INFECTION: ICD-10-CM

## 2017-08-08 PROCEDURE — 76937 US GUIDE VASCULAR ACCESS: CPT | Mod: TC

## 2017-08-08 PROCEDURE — 76937 US GUIDE VASCULAR ACCESS: CPT | Mod: 26,,, | Performed by: RADIOLOGY

## 2017-08-08 PROCEDURE — 77001 FLUOROGUIDE FOR VEIN DEVICE: CPT | Mod: TC

## 2017-08-08 PROCEDURE — 77001 FLUOROGUIDE FOR VEIN DEVICE: CPT | Mod: 26,,, | Performed by: RADIOLOGY

## 2017-08-08 PROCEDURE — 36569 INSJ PICC 5 YR+ W/O IMAGING: CPT | Mod: ,,, | Performed by: RADIOLOGY

## 2017-08-08 NOTE — PROCEDURES
Radiology Post-Procedure Note    Pre Op Diagnosis: Lung nodules     Post Op Diagnosis: Same    Procedure: PICC placement    Procedure performed by: Gina Cervantes MD    Written Informed Consent Obtained: Yes    Specimen Removed: No    Estimated Blood Loss: Minimal    Findings:   Using realtime U/S guidance a 5 Fr PICC catheter was placed into the left Basilic Vein with tip of the catheter in the SVC.    PICC is ready for use.     Alexander Vasquez   Interventional radiology

## 2017-08-08 NOTE — H&P
Radiology History & Physical      SUBJECTIVE:     Chief Complaint: lung nodules    History of Present Illness:  Dariusz Urbina is a 62 y.o. male who presents for PICC line placement.    Pt with h/o lung nodules, biopsy is +ve for fungal infection. ID suspecting an underlying bacterial infection and plan to treat with antibiotic.     Past Medical History:   Diagnosis Date    Decreased libido 11/11/2013    Dermatitis 10/12/2012    Hyperlipidemia 11/11/2013     Past Surgical History:   Procedure Laterality Date    COLONOSCOPY      LUMBAR FUSION      titanium rods    TONSILLECTOMY         Home Meds:   Prior to Admission medications    Medication Sig Start Date End Date Taking? Authorizing Provider   aspirin (ECOTRIN) 81 MG EC tablet Take 81 mg by mouth every morning.     Historical Provider, MD   fluconazole (DIFLUCAN) 200 MG Tab Take 4 tablets (800 mg total) by mouth once daily. 7/25/17   Juany Barajas MD   oxycodone-acetaminophen (PERCOCET) 5-325 mg per tablet Take 1 tablet by mouth every 4 (four) hours as needed for Pain. 7/20/17   CHARLES Rivera   pravastatin (PRAVACHOL) 80 MG tablet Take 1 tablet (80 mg total) by mouth once daily. 1/23/17   TALIA Brennan-ROBIN   sulfamethoxazole-trimethoprim 800-160mg (BACTRIM DS) 800-160 mg Tab Take 1 tablet by mouth 2 (two) times daily. 7/30/17 8/9/17  Carlos Jacinto MD   triamcinolone acetonide 0.1% (KENALOG) 0.1 % cream Apply topically 2 (two) times daily. 7/3/17   CATARINO Brennan     Anticoagulants/Antiplatelets: no anticoagulation    Allergies: Review of patient's allergies indicates:  No Known Allergies  Sedation History:  no adverse reactions    Review of Systems:   Hematological: no known coagulopathies  Respiratory: no shortness of breath  Cardiovascular: no chest pain  Gastrointestinal: no abdominal pain  Genito-Urinary: no dysuria  Musculoskeletal: negative  Neurological: negative         OBJECTIVE:     Vital Signs (Most Recent)        Physical Exam:  ASA: 2  Mallampati: 2    General: no acute distress  Mental Status: alert and oriented to person, place and time  HEENT: normocephalic, atraumatic  Chest: unlabored breathing  Heart: regular heart rate  Abdomen: nondistended  Extremity: moves all extremities    Laboratory  Lab Results   Component Value Date    INR 1.0 06/16/2017       Lab Results   Component Value Date    WBC 6.04 07/11/2017    HGB 14.4 07/11/2017    HCT 42.9 07/11/2017    MCV 82 07/11/2017     07/11/2017      Lab Results   Component Value Date     (H) 07/11/2017     07/11/2017    K 4.2 07/11/2017     07/11/2017    CO2 27 07/11/2017    BUN 15 07/11/2017    CREATININE 1.0 07/11/2017    CALCIUM 9.9 07/11/2017    ALT 34 07/11/2017    AST 33 07/11/2017    ALBUMIN 3.3 (L) 07/11/2017    BILITOT 0.3 07/11/2017       ASSESSMENT/PLAN:     Sedation Plan: Local  Patient will undergo PICC line placement.    Alexander Vasquez   Interventional radiology

## 2017-08-12 ENCOUNTER — TELEPHONE (OUTPATIENT)
Dept: INFECTIOUS DISEASES | Facility: CLINIC | Age: 62
End: 2017-08-12

## 2017-08-12 DIAGNOSIS — J18.9 PULMONARY INFECTION: Primary | ICD-10-CM

## 2017-08-15 ENCOUNTER — PATIENT MESSAGE (OUTPATIENT)
Dept: INFECTIOUS DISEASES | Facility: CLINIC | Age: 62
End: 2017-08-15

## 2017-08-16 ENCOUNTER — LAB VISIT (OUTPATIENT)
Dept: LAB | Facility: HOSPITAL | Age: 62
End: 2017-08-16
Attending: INTERNAL MEDICINE
Payer: COMMERCIAL

## 2017-08-16 DIAGNOSIS — R91.8 LUNG MASS: Primary | ICD-10-CM

## 2017-08-16 LAB
BASOPHILS # BLD AUTO: 0.06 K/UL
BASOPHILS NFR BLD: 0.7 %
DIFFERENTIAL METHOD: ABNORMAL
EOSINOPHIL # BLD AUTO: 0.3 K/UL
EOSINOPHIL NFR BLD: 4 %
ERYTHROCYTE [DISTWIDTH] IN BLOOD BY AUTOMATED COUNT: 14.5 %
ERYTHROCYTE [SEDIMENTATION RATE] IN BLOOD BY WESTERGREN METHOD: 25 MM/HR
HCT VFR BLD AUTO: 38.2 %
HGB BLD-MCNC: 13.1 G/DL
LYMPHOCYTES # BLD AUTO: 1.5 K/UL
LYMPHOCYTES NFR BLD: 18.1 %
MCH RBC QN AUTO: 28.5 PG
MCHC RBC AUTO-ENTMCNC: 34.3 G/DL
MCV RBC AUTO: 83 FL
MONOCYTES # BLD AUTO: 1.3 K/UL
MONOCYTES NFR BLD: 15.8 %
NEUTROPHILS # BLD AUTO: 5.1 K/UL
NEUTROPHILS NFR BLD: 60.7 %
PLATELET # BLD AUTO: 246 K/UL
PMV BLD AUTO: 9.8 FL
RBC # BLD AUTO: 4.6 M/UL
WBC # BLD AUTO: 8.33 K/UL

## 2017-08-16 PROCEDURE — 86140 C-REACTIVE PROTEIN: CPT

## 2017-08-16 PROCEDURE — 80053 COMPREHEN METABOLIC PANEL: CPT

## 2017-08-16 PROCEDURE — 85025 COMPLETE CBC W/AUTO DIFF WBC: CPT

## 2017-08-16 PROCEDURE — 85651 RBC SED RATE NONAUTOMATED: CPT

## 2017-08-17 ENCOUNTER — TELEPHONE (OUTPATIENT)
Dept: INFECTIOUS DISEASES | Facility: CLINIC | Age: 62
End: 2017-08-17

## 2017-08-17 LAB
ALBUMIN SERPL BCP-MCNC: 3.4 G/DL
ALP SERPL-CCNC: 66 U/L
ALT SERPL W/O P-5'-P-CCNC: 50 U/L
ANION GAP SERPL CALC-SCNC: 15 MMOL/L
AST SERPL-CCNC: 32 U/L
BILIRUB SERPL-MCNC: 0.2 MG/DL
BUN SERPL-MCNC: 12 MG/DL
CALCIUM SERPL-MCNC: 9.4 MG/DL
CHLORIDE SERPL-SCNC: 105 MMOL/L
CO2 SERPL-SCNC: 23 MMOL/L
CREAT SERPL-MCNC: 0.9 MG/DL
CRP SERPL-MCNC: 11.3 MG/L
EST. GFR  (AFRICAN AMERICAN): >60 ML/MIN/1.73 M^2
EST. GFR  (NON AFRICAN AMERICAN): >60 ML/MIN/1.73 M^2
GLUCOSE SERPL-MCNC: 87 MG/DL
POTASSIUM SERPL-SCNC: 4 MMOL/L
PROT SERPL-MCNC: 6.8 G/DL
SODIUM SERPL-SCNC: 143 MMOL/L

## 2017-08-17 NOTE — TELEPHONE ENCOUNTER
----- Message from Mariia Lu sent at 8/17/2017 10:02 AM CDT -----  Contact: Patient  Requesting a call back.    Please call [phone].

## 2017-08-18 ENCOUNTER — PATIENT MESSAGE (OUTPATIENT)
Dept: INFECTIOUS DISEASES | Facility: CLINIC | Age: 62
End: 2017-08-18

## 2017-08-19 ENCOUNTER — HOSPITAL ENCOUNTER (EMERGENCY)
Facility: OTHER | Age: 62
Discharge: HOME | End: 2017-08-19
Attending: INTERNAL MEDICINE
Payer: COMMERCIAL

## 2017-08-19 VITALS
DIASTOLIC BLOOD PRESSURE: 92 MMHG | HEART RATE: 81 BPM | OXYGEN SATURATION: 98 % | TEMPERATURE: 99 F | RESPIRATION RATE: 18 BRPM | SYSTOLIC BLOOD PRESSURE: 158 MMHG

## 2017-08-19 DIAGNOSIS — Z48.01 ENCOUNTER FOR SURGICAL WOUND DRESSING CHANGE: Primary | ICD-10-CM

## 2017-08-19 PROCEDURE — 99281 EMR DPT VST MAYX REQ PHY/QHP: CPT

## 2017-08-20 NOTE — ED NOTES
Dressing removed from left upper arm PICC line, sterile dressing applied, dated and timed, pt tolerated well, pt reports he is receiving antibiotics for a lung infection, goes to infusion center on Wednesdays to have dressings changed, redness noted right around insertion site, redness around edge of old dressing, appears to be from sensitivity to dressing, no signs of infection noted, pt instructed to return for any changes in appearance of dressing or site, and to f/u with PCP as needed

## 2017-08-20 NOTE — ED PROVIDER NOTES
Encounter Date: 8/19/2017       History     Chief Complaint   Patient presents with    Dressing Change     patient reports dressing to left upper arm picc line came off     62-year-old male presents to the emergency department with a request for a wound dressing change.  States he lost his PICC line dressing from his left upper arm Patient denies fevers/chills      The history is provided by the patient. No  was used.   General Illness    The current episode started today. The problem has been unchanged. The pain is at a severity of 0/10. Pertinent negatives include no fever. He has received no recent medical care.     Review of patient's allergies indicates:  No Known Allergies  Past Medical History:   Diagnosis Date    Decreased libido 11/11/2013    Dermatitis 10/12/2012    Hyperlipidemia 11/11/2013     Past Surgical History:   Procedure Laterality Date    COLONOSCOPY      LUMBAR FUSION      titanium rods    TONSILLECTOMY       History reviewed. No pertinent family history.  Social History   Substance Use Topics    Smoking status: Former Smoker     Packs/day: 1.00     Years: 10.00     Quit date: 7/13/1989    Smokeless tobacco: Never Used    Alcohol use No     Review of Systems   Constitutional: Negative for fever.   Skin: Positive for wound.   All other systems reviewed and are negative.      Physical Exam     Initial Vitals [08/19/17 1844]   BP Pulse Resp Temp SpO2   (!) 158/92 81 18 98.5 °F (36.9 °C) 98 %      MAP       114         Physical Exam    Nursing note and vitals reviewed.  Constitutional: He appears well-developed and well-nourished.   HENT:   Head: Normocephalic.   Eyes: EOM are normal.   Neck: Normal range of motion.   Cardiovascular: Normal rate.   Pulmonary/Chest: No respiratory distress.   Abdominal: He exhibits no distension.   Musculoskeletal: Normal range of motion.   Neurological: He is alert.   Skin: Skin is warm.   Left upper arm with PICC line in place and  clean/dry insertion site.   Psychiatric: He has a normal mood and affect.         ED Course   Procedures  Labs Reviewed - No data to display          Medical Decision Making:   Initial Assessment:   62-year-old male presents to the emergency department with a request for a wound dressing change.  Wound is to the left upper arm and is a result of discontinuation of the PICC line.  Patient denies fevers/chills    Differential Diagnosis:   Cellulitis  Abscess  Healing wound    ED Management:  Patient had dressing changed without difficulty after wound was cleaned.  He was advised to follow-up with his primary care physician in 2 days for reevaluation.                   ED Course     Clinical Impression:   The encounter diagnosis was Encounter for surgical wound dressing change.    Disposition:   Disposition: Discharged  Condition: Stable                        Hakan Caballero MD  08/19/17 9419

## 2017-08-20 NOTE — ED NOTES
"PT VERIFIES THAT NAME AND  ARE CORRECT.     LOC: The patient is awake, alert and aware of environment with an appropriate affect, the patient is oriented x 3 and answers all questions appropriately.    APPEARANCE: Patient resting comfortably and in no acute distress, appears well nourished and groomed    SKIN: The skin is warm and dry, color consistent with ethnicity, patient has normal skin turgor and moist mucus membranes, skin intact, no breakdown, brusing or alterations in skin integrity noted.    MUSCULOSKELETAL: Patient moving all extremities well, CMS intact, no obvious swelling, deformity or trauma noted     RESPIRATORY: Airway is open and patent, trachea is midline, respirations are spontaneous, even and non-labored, Patient denies any shortness of breath or difficulty breathing at this time, pt reports he has "a lung infection" and that is why he is receiving the PICC line antibiotics, pt in no distress at this time, no symptoms from this     NEUROLOGIC: eyes open spontaneously, behavior appropriate to situation, follows all commands, facial expression symmetrical, purposeful motor response noted,     CHIEF COMPLAINT: pt requesting dressing to PICC Line in left upper arm be changed, came off after he was caught in the rain today    "

## 2017-08-22 ENCOUNTER — PATIENT MESSAGE (OUTPATIENT)
Dept: INFECTIOUS DISEASES | Facility: CLINIC | Age: 62
End: 2017-08-22

## 2017-08-23 ENCOUNTER — LAB VISIT (OUTPATIENT)
Dept: LAB | Facility: HOSPITAL | Age: 62
End: 2017-08-23
Attending: INTERNAL MEDICINE
Payer: COMMERCIAL

## 2017-08-23 DIAGNOSIS — R91.8 LUNG MASS: Primary | ICD-10-CM

## 2017-08-23 LAB
ALBUMIN SERPL BCP-MCNC: 3.2 G/DL
ALP SERPL-CCNC: 63 U/L
ALT SERPL W/O P-5'-P-CCNC: 29 U/L
ANION GAP SERPL CALC-SCNC: 9 MMOL/L
AST SERPL-CCNC: 21 U/L
BASOPHILS # BLD AUTO: 0.05 K/UL
BASOPHILS NFR BLD: 0.8 %
BILIRUB SERPL-MCNC: 0.3 MG/DL
BUN SERPL-MCNC: 16 MG/DL
CALCIUM SERPL-MCNC: 9.2 MG/DL
CHLORIDE SERPL-SCNC: 104 MMOL/L
CO2 SERPL-SCNC: 27 MMOL/L
CREAT SERPL-MCNC: 1 MG/DL
CRP SERPL-MCNC: 13.7 MG/L
DIFFERENTIAL METHOD: ABNORMAL
EOSINOPHIL # BLD AUTO: 0.3 K/UL
EOSINOPHIL NFR BLD: 3.9 %
ERYTHROCYTE [DISTWIDTH] IN BLOOD BY AUTOMATED COUNT: 15.2 %
ERYTHROCYTE [SEDIMENTATION RATE] IN BLOOD BY WESTERGREN METHOD: 30 MM/HR
EST. GFR  (AFRICAN AMERICAN): >60 ML/MIN/1.73 M^2
EST. GFR  (NON AFRICAN AMERICAN): >60 ML/MIN/1.73 M^2
FUNGUS SPEC CULT: NORMAL
FUNGUS SPEC CULT: NORMAL
GLUCOSE SERPL-MCNC: 142 MG/DL
HCT VFR BLD AUTO: 38.3 %
HGB BLD-MCNC: 13.1 G/DL
LYMPHOCYTES # BLD AUTO: 1.2 K/UL
LYMPHOCYTES NFR BLD: 18.5 %
MCH RBC QN AUTO: 28.7 PG
MCHC RBC AUTO-ENTMCNC: 34.2 G/DL
MCV RBC AUTO: 84 FL
MONOCYTES # BLD AUTO: 0.8 K/UL
MONOCYTES NFR BLD: 11.7 %
NEUTROPHILS # BLD AUTO: 4.2 K/UL
NEUTROPHILS NFR BLD: 64.8 %
PLATELET # BLD AUTO: 259 K/UL
PMV BLD AUTO: 9.3 FL
POTASSIUM SERPL-SCNC: 4.1 MMOL/L
PROT SERPL-MCNC: 7 G/DL
RBC # BLD AUTO: 4.56 M/UL
SODIUM SERPL-SCNC: 140 MMOL/L
WBC # BLD AUTO: 6.49 K/UL

## 2017-08-23 PROCEDURE — 85025 COMPLETE CBC W/AUTO DIFF WBC: CPT

## 2017-08-23 PROCEDURE — 85651 RBC SED RATE NONAUTOMATED: CPT

## 2017-08-23 PROCEDURE — 86140 C-REACTIVE PROTEIN: CPT

## 2017-08-23 PROCEDURE — 80053 COMPREHEN METABOLIC PANEL: CPT

## 2017-08-25 ENCOUNTER — TELEPHONE (OUTPATIENT)
Dept: FAMILY MEDICINE | Facility: CLINIC | Age: 62
End: 2017-08-25

## 2017-08-25 NOTE — TELEPHONE ENCOUNTER
----- Message from Mari Mohamud sent at 8/25/2017 12:13 PM CDT -----  Patient is requesting a return call concerning ongoing medical issue from Dr Espino himself. Please call at [phone] Thank you!

## 2017-08-30 ENCOUNTER — LAB VISIT (OUTPATIENT)
Dept: LAB | Facility: HOSPITAL | Age: 62
End: 2017-08-30
Attending: INTERNAL MEDICINE
Payer: COMMERCIAL

## 2017-08-30 DIAGNOSIS — R91.8 LUNG MASS: Primary | ICD-10-CM

## 2017-08-30 LAB
ALBUMIN SERPL BCP-MCNC: 3.4 G/DL
ALP SERPL-CCNC: 67 U/L
ALT SERPL W/O P-5'-P-CCNC: 30 U/L
ANION GAP SERPL CALC-SCNC: 9 MMOL/L
AST SERPL-CCNC: 25 U/L
BASOPHILS # BLD AUTO: 0.05 K/UL
BASOPHILS NFR BLD: 0.8 %
BILIRUB SERPL-MCNC: 0.5 MG/DL
BUN SERPL-MCNC: 13 MG/DL
CALCIUM SERPL-MCNC: 9.2 MG/DL
CHLORIDE SERPL-SCNC: 103 MMOL/L
CO2 SERPL-SCNC: 26 MMOL/L
CREAT SERPL-MCNC: 0.9 MG/DL
CRP SERPL-MCNC: 16.1 MG/L
DIFFERENTIAL METHOD: ABNORMAL
EOSINOPHIL # BLD AUTO: 0.2 K/UL
EOSINOPHIL NFR BLD: 2.7 %
ERYTHROCYTE [DISTWIDTH] IN BLOOD BY AUTOMATED COUNT: 15 %
ERYTHROCYTE [SEDIMENTATION RATE] IN BLOOD BY WESTERGREN METHOD: 20 MM/HR
EST. GFR  (AFRICAN AMERICAN): >60 ML/MIN/1.73 M^2
EST. GFR  (NON AFRICAN AMERICAN): >60 ML/MIN/1.73 M^2
GLUCOSE SERPL-MCNC: 92 MG/DL
HCT VFR BLD AUTO: 39.5 %
HGB BLD-MCNC: 13.8 G/DL
LYMPHOCYTES # BLD AUTO: 1.3 K/UL
LYMPHOCYTES NFR BLD: 21 %
MCH RBC QN AUTO: 28.9 PG
MCHC RBC AUTO-ENTMCNC: 34.9 G/DL
MCV RBC AUTO: 83 FL
MONOCYTES # BLD AUTO: 0.9 K/UL
MONOCYTES NFR BLD: 14.8 %
NEUTROPHILS # BLD AUTO: 3.6 K/UL
NEUTROPHILS NFR BLD: 60.5 %
PLATELET # BLD AUTO: 280 K/UL
PMV BLD AUTO: 9.5 FL
POTASSIUM SERPL-SCNC: 4.3 MMOL/L
PROT SERPL-MCNC: 7 G/DL
RBC # BLD AUTO: 4.77 M/UL
SODIUM SERPL-SCNC: 138 MMOL/L
WBC # BLD AUTO: 6 K/UL

## 2017-08-30 PROCEDURE — 85025 COMPLETE CBC W/AUTO DIFF WBC: CPT

## 2017-08-30 PROCEDURE — 85651 RBC SED RATE NONAUTOMATED: CPT

## 2017-08-30 PROCEDURE — 86140 C-REACTIVE PROTEIN: CPT

## 2017-08-30 PROCEDURE — 80053 COMPREHEN METABOLIC PANEL: CPT

## 2017-09-06 ENCOUNTER — LAB VISIT (OUTPATIENT)
Dept: LAB | Facility: HOSPITAL | Age: 62
End: 2017-09-06
Attending: INTERNAL MEDICINE
Payer: COMMERCIAL

## 2017-09-06 DIAGNOSIS — R91.8 LUNG MASS: Primary | ICD-10-CM

## 2017-09-06 LAB
ALBUMIN SERPL BCP-MCNC: 3.5 G/DL
ALP SERPL-CCNC: 65 U/L
ALT SERPL W/O P-5'-P-CCNC: 35 U/L
ANION GAP SERPL CALC-SCNC: 10 MMOL/L
AST SERPL-CCNC: 31 U/L
BASOPHILS # BLD AUTO: 0.04 K/UL
BASOPHILS NFR BLD: 0.6 %
BILIRUB SERPL-MCNC: 0.5 MG/DL
BUN SERPL-MCNC: 13 MG/DL
CALCIUM SERPL-MCNC: 9.9 MG/DL
CHLORIDE SERPL-SCNC: 103 MMOL/L
CO2 SERPL-SCNC: 25 MMOL/L
CREAT SERPL-MCNC: 1 MG/DL
CRP SERPL-MCNC: 8.7 MG/L
DIFFERENTIAL METHOD: ABNORMAL
EOSINOPHIL # BLD AUTO: 0.2 K/UL
EOSINOPHIL NFR BLD: 3.2 %
ERYTHROCYTE [DISTWIDTH] IN BLOOD BY AUTOMATED COUNT: 15.3 %
ERYTHROCYTE [SEDIMENTATION RATE] IN BLOOD BY WESTERGREN METHOD: 17 MM/HR
EST. GFR  (AFRICAN AMERICAN): >60 ML/MIN/1.73 M^2
EST. GFR  (NON AFRICAN AMERICAN): >60 ML/MIN/1.73 M^2
GLUCOSE SERPL-MCNC: 91 MG/DL
HCT VFR BLD AUTO: 42 %
HGB BLD-MCNC: 14.1 G/DL
LYMPHOCYTES # BLD AUTO: 1.5 K/UL
LYMPHOCYTES NFR BLD: 23.4 %
MCH RBC QN AUTO: 28.3 PG
MCHC RBC AUTO-ENTMCNC: 33.6 G/DL
MCV RBC AUTO: 84 FL
MONOCYTES # BLD AUTO: 0.9 K/UL
MONOCYTES NFR BLD: 14.7 %
NEUTROPHILS # BLD AUTO: 3.6 K/UL
NEUTROPHILS NFR BLD: 57.9 %
PLATELET # BLD AUTO: 272 K/UL
PMV BLD AUTO: 9.9 FL
POTASSIUM SERPL-SCNC: 4.4 MMOL/L
PROT SERPL-MCNC: 7.4 G/DL
RBC # BLD AUTO: 4.98 M/UL
SODIUM SERPL-SCNC: 138 MMOL/L
WBC # BLD AUTO: 6.19 K/UL

## 2017-09-06 PROCEDURE — 85651 RBC SED RATE NONAUTOMATED: CPT

## 2017-09-06 PROCEDURE — 85025 COMPLETE CBC W/AUTO DIFF WBC: CPT

## 2017-09-06 PROCEDURE — 86140 C-REACTIVE PROTEIN: CPT

## 2017-09-06 PROCEDURE — 80053 COMPREHEN METABOLIC PANEL: CPT

## 2017-09-07 ENCOUNTER — PATIENT MESSAGE (OUTPATIENT)
Dept: INFECTIOUS DISEASES | Facility: CLINIC | Age: 62
End: 2017-09-07

## 2017-09-11 ENCOUNTER — LAB VISIT (OUTPATIENT)
Dept: LAB | Facility: HOSPITAL | Age: 62
End: 2017-09-11
Attending: INTERNAL MEDICINE
Payer: COMMERCIAL

## 2017-09-11 DIAGNOSIS — R91.8 LUNG MASS: Primary | ICD-10-CM

## 2017-09-11 LAB
ALBUMIN SERPL BCP-MCNC: 3.1 G/DL
ALP SERPL-CCNC: 56 U/L
ALT SERPL W/O P-5'-P-CCNC: 23 U/L
ANION GAP SERPL CALC-SCNC: 9 MMOL/L
AST SERPL-CCNC: 22 U/L
BASOPHILS # BLD AUTO: 0.05 K/UL
BASOPHILS NFR BLD: 0.9 %
BILIRUB SERPL-MCNC: 0.4 MG/DL
BUN SERPL-MCNC: 13 MG/DL
CALCIUM SERPL-MCNC: 8.8 MG/DL
CHLORIDE SERPL-SCNC: 105 MMOL/L
CO2 SERPL-SCNC: 25 MMOL/L
CREAT SERPL-MCNC: 0.9 MG/DL
CRP SERPL-MCNC: 5.7 MG/L
DIFFERENTIAL METHOD: ABNORMAL
EOSINOPHIL # BLD AUTO: 0.3 K/UL
EOSINOPHIL NFR BLD: 4.9 %
ERYTHROCYTE [DISTWIDTH] IN BLOOD BY AUTOMATED COUNT: 15.6 %
ERYTHROCYTE [SEDIMENTATION RATE] IN BLOOD BY WESTERGREN METHOD: 11 MM/HR
EST. GFR  (AFRICAN AMERICAN): >60 ML/MIN/1.73 M^2
EST. GFR  (NON AFRICAN AMERICAN): >60 ML/MIN/1.73 M^2
GLUCOSE SERPL-MCNC: 101 MG/DL
HCT VFR BLD AUTO: 40.4 %
HGB BLD-MCNC: 13.6 G/DL
LYMPHOCYTES # BLD AUTO: 1.5 K/UL
LYMPHOCYTES NFR BLD: 27.5 %
MCH RBC QN AUTO: 28.6 PG
MCHC RBC AUTO-ENTMCNC: 33.7 G/DL
MCV RBC AUTO: 85 FL
MONOCYTES # BLD AUTO: 0.8 K/UL
MONOCYTES NFR BLD: 15.2 %
NEUTROPHILS # BLD AUTO: 2.7 K/UL
NEUTROPHILS NFR BLD: 51.3 %
PLATELET # BLD AUTO: 262 K/UL
PMV BLD AUTO: 9.6 FL
POTASSIUM SERPL-SCNC: 4.2 MMOL/L
PROT SERPL-MCNC: 6.4 G/DL
RBC # BLD AUTO: 4.75 M/UL
SODIUM SERPL-SCNC: 139 MMOL/L
WBC # BLD AUTO: 5.28 K/UL

## 2017-09-11 PROCEDURE — 85651 RBC SED RATE NONAUTOMATED: CPT

## 2017-09-11 PROCEDURE — 86140 C-REACTIVE PROTEIN: CPT

## 2017-09-11 PROCEDURE — 80053 COMPREHEN METABOLIC PANEL: CPT

## 2017-09-11 PROCEDURE — 85025 COMPLETE CBC W/AUTO DIFF WBC: CPT

## 2017-09-15 ENCOUNTER — OFFICE VISIT (OUTPATIENT)
Dept: INFECTIOUS DISEASES | Facility: CLINIC | Age: 62
End: 2017-09-15
Payer: COMMERCIAL

## 2017-09-15 VITALS
WEIGHT: 180.31 LBS | DIASTOLIC BLOOD PRESSURE: 83 MMHG | HEIGHT: 70 IN | BODY MASS INDEX: 25.81 KG/M2 | SYSTOLIC BLOOD PRESSURE: 153 MMHG | TEMPERATURE: 99 F | HEART RATE: 101 BPM

## 2017-09-15 DIAGNOSIS — R91.8 LUNG MASS: ICD-10-CM

## 2017-09-15 DIAGNOSIS — R91.8 LUNG NODULES: Primary | ICD-10-CM

## 2017-09-15 DIAGNOSIS — B49 FUNGAL INFECTION OF LUNG: ICD-10-CM

## 2017-09-15 DIAGNOSIS — A42.9 ACTINOMYCOSIS DUE TO ACTINOMYCES ODONTOLYTICUS: ICD-10-CM

## 2017-09-15 PROCEDURE — 3008F BODY MASS INDEX DOCD: CPT | Mod: S$GLB,,, | Performed by: INTERNAL MEDICINE

## 2017-09-15 PROCEDURE — 99214 OFFICE O/P EST MOD 30 MIN: CPT | Mod: S$GLB,,, | Performed by: INTERNAL MEDICINE

## 2017-09-15 PROCEDURE — 99999 PR PBB SHADOW E&M-EST. PATIENT-LVL III: CPT | Mod: PBBFAC,,, | Performed by: INTERNAL MEDICINE

## 2017-09-15 NOTE — PROGRESS NOTES
"Subjective:      Patient ID: Dariusz Urbina is a 62 y.o. male.    Chief Complaint:Follow-up      History of Present Illness  Presented on Chika 10, 2017 with 2 weeks of fatigue, productive cough, shortness of breath on exertion, and fever.  X-ray on Chika 10, 2017 showed patchy airspace disease with multiple rounded opacities.  CT on June 12 showed numerous opacities throughout the lungs bilaterally.  The largest measured 4 cm in the right lower lobe with one in the right upper lobe measuring 3.1 cm and one in the right middle lobe measuring 3.4 cm.  There was an area of consolidation measuring 9 cm in the left lower lobe.      CT-guided biopsy of the lung was performed on June 16. No cultures were done.   Biopsy from FNA on June 16 showed fibrosis with granuloma and focal necrosis.  No malignancy was seen. Special stains were positive for fungal yeast forms. PCR for fungal RNA was negative, however, the report states "formalin dramatically reduces the sensitivity of the assays due to reduced template yield in quality."     PET scan on June 19 showed "innumerable hypermetabolic masses."    Cryptococcal antigen on June 21 was negative.  Repeat on July 11 also negative.      On July 19 he had bronchoscopy with LLL BAL and right upper lobe wedge resection via VATS.     BAL from the left lower lobe showed no malignant cells and a few inflammatory cells.    Pathology from the right upper lobe wedge resection showed formed, noncaseating, giant cell granulomata.  No fungal elements on GMS stain and no filamentous organisms on PASD stain seen.  No neoplasm or AFB were detected.    Cultures on July 19 from the left lower lobe were negative for aerobes but positive for Actinomyces odontolyticus in the anaerobic culture.  No AFB or fungus was seen or grown.  The anaerobic culture in the right upper lobe grew Finegoldia magna, and cultures for aerobes, AFB, fungi were negative.    Treatment included fluconazole started in late " June at 800 mg daily and IV antibiotics starting on 8/8. PICC line was placed on August 8. He is on this therapy until Sept 20.    He says that he has no more fever. His cough is improved, and he is not short of breath with exertion. He is concerned that he has damage to his lungs.     Review of Systems   Constitution: Negative for chills, decreased appetite, fever, weakness, malaise/fatigue, night sweats, weight gain and weight loss.   HENT: Negative for congestion, ear pain, hearing loss, hoarse voice, sore throat and tinnitus.    Eyes: Negative for blurred vision, redness and visual disturbance.   Cardiovascular: Negative for chest pain, leg swelling and palpitations.   Respiratory: Positive for shortness of breath. Negative for cough, hemoptysis and sputum production.    Hematologic/Lymphatic: Negative for adenopathy. Does not bruise/bleed easily.   Skin: Negative for dry skin, itching, rash and suspicious lesions.   Musculoskeletal: Negative for back pain, joint pain, myalgias and neck pain.   Gastrointestinal: Negative for abdominal pain, constipation, diarrhea, heartburn, nausea and vomiting.   Genitourinary: Negative for dysuria, flank pain, frequency, hematuria, hesitancy and urgency.   Neurological: Negative for dizziness, headaches, numbness and paresthesias.   Psychiatric/Behavioral: Negative for depression and memory loss. The patient does not have insomnia and is not nervous/anxious.      Objective:   Physical Exam   Constitutional: He appears well-developed and well-nourished.   HENT:   Head: Normocephalic and atraumatic.   Eyes: Conjunctivae and EOM are normal. Pupils are equal, round, and reactive to light.   Neck: Normal range of motion. Neck supple.   Cardiovascular: Normal rate, regular rhythm and normal heart sounds.    Pulmonary/Chest: Effort normal and breath sounds normal. He has no rales.   Incisions on right chest healed   Abdominal: Soft. Bowel sounds are normal. He exhibits no distension.  There is no tenderness.   Musculoskeletal: Normal range of motion. He exhibits no edema.   Lymphadenopathy:     He has no cervical adenopathy.   Skin: Skin is warm and dry.   PICC line in place and intact - no cords or erythema.   Psychiatric: He has a normal mood and affect. His behavior is normal. Judgment and thought content normal.   Nursing note and vitals reviewed.    Assessment:       1. Lung nodules    2. Lung mass    3. 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 Fungal infection of lung          Plan:       I spent over 50 % of a 60 minute encounter explaining the test results, the treatments, and the limitations of the tests as well as the treatments.  He understands that he may have both fungal infection (of unknown species) and actinomycosis.  Plan:  1. Stop Unasyn and start doxycycline for Actinomycosis  2. Stop fluconazole as scheduled  3. Repeat CT scan to determine need for further therapy or investigation (biopsy, BAL, etc.)  4. Check IgG levels  5. Follow up for fungal disease off therapy with serology and cultures as needed  6. Follow up PFTs in 6 months to determine functional issues.

## 2017-09-18 ENCOUNTER — HOSPITAL ENCOUNTER (OUTPATIENT)
Dept: RADIOLOGY | Facility: HOSPITAL | Age: 62
Discharge: HOME | End: 2017-09-18
Attending: INTERNAL MEDICINE
Payer: COMMERCIAL

## 2017-09-18 ENCOUNTER — PATIENT MESSAGE (OUTPATIENT)
Dept: INFECTIOUS DISEASES | Facility: CLINIC | Age: 62
End: 2017-09-18

## 2017-09-18 DIAGNOSIS — J18.9 PULMONARY INFECTION: ICD-10-CM

## 2017-09-18 PROCEDURE — 71250 CT THORAX DX C-: CPT | Mod: 26,,, | Performed by: RADIOLOGY

## 2017-09-18 PROCEDURE — 71250 CT THORAX DX C-: CPT | Mod: TC

## 2017-09-19 ENCOUNTER — OFFICE VISIT (OUTPATIENT)
Dept: INFECTIOUS DISEASES | Facility: CLINIC | Age: 62
End: 2017-09-19
Payer: COMMERCIAL

## 2017-09-19 VITALS — TEMPERATURE: 99 F | HEIGHT: 70 IN | BODY MASS INDEX: 25.81 KG/M2 | WEIGHT: 180.31 LBS

## 2017-09-19 DIAGNOSIS — Z45.2 PICC (PERIPHERALLY INSERTED CENTRAL CATHETER) REMOVAL: ICD-10-CM

## 2017-09-19 DIAGNOSIS — B49 FUNGAL INFECTION OF LUNG: ICD-10-CM

## 2017-09-19 DIAGNOSIS — A42.9 ACTINOMYCOSIS DUE TO ACTINOMYCES ODONTOLYTICUS: Primary | ICD-10-CM

## 2017-09-19 PROCEDURE — 99213 OFFICE O/P EST LOW 20 MIN: CPT | Mod: S$GLB,,, | Performed by: INTERNAL MEDICINE

## 2017-09-19 PROCEDURE — 3008F BODY MASS INDEX DOCD: CPT | Mod: S$GLB,,, | Performed by: INTERNAL MEDICINE

## 2017-09-19 PROCEDURE — 99999 PR PBB SHADOW E&M-EST. PATIENT-LVL III: CPT | Mod: PBBFAC,,, | Performed by: INTERNAL MEDICINE

## 2017-09-19 RX ORDER — FLUCONAZOLE 200 MG/1
400 TABLET ORAL DAILY
Qty: 120 TABLET | Refills: 1 | Status: SHIPPED | OUTPATIENT
Start: 2017-09-19 | End: 2019-05-02

## 2017-09-19 RX ORDER — DOXYCYCLINE 100 MG/1
100 CAPSULE ORAL 2 TIMES DAILY
Qty: 60 CAPSULE | Refills: 5 | Status: SHIPPED | OUTPATIENT
Start: 2017-09-19 | End: 2018-04-23 | Stop reason: SDUPTHER

## 2017-09-19 NOTE — PROGRESS NOTES
Subjective:      Patient ID: Dariusz Urbina is a 62 y.o. male.    Chief Complaint:Follow-up      History of Present Illness  Patient returns for follow up from CT scan and removal of PICC. CT scan shows improvement but not resolution.     Review of Systems   Constitution: Negative for chills, decreased appetite, fever, weakness, malaise/fatigue, night sweats, weight gain and weight loss.   HENT: Negative for congestion, ear pain, hearing loss, hoarse voice, sore throat and tinnitus.    Eyes: Negative for blurred vision, redness and visual disturbance.   Cardiovascular: Negative for chest pain, leg swelling and palpitations.   Respiratory: Negative for cough, hemoptysis, shortness of breath and sputum production.    Hematologic/Lymphatic: Negative for adenopathy. Does not bruise/bleed easily.   Skin: Negative for dry skin, itching, rash and suspicious lesions.   Musculoskeletal: Negative for back pain, joint pain, myalgias and neck pain.   Gastrointestinal: Negative for abdominal pain, constipation, diarrhea, heartburn, nausea and vomiting.   Genitourinary: Negative for dysuria, flank pain, frequency, hematuria, hesitancy and urgency.   Neurological: Negative for dizziness, headaches, numbness and paresthesias.   Psychiatric/Behavioral: Negative for depression and memory loss. The patient does not have insomnia and is not nervous/anxious.      Objective:   Physical Exam   Constitutional: He appears well-developed and well-nourished.   HENT:   Head: Normocephalic and atraumatic.   Eyes: Conjunctivae and EOM are normal. Pupils are equal, round, and reactive to light.   Neck: Normal range of motion. Neck supple.   Cardiovascular: Normal rate, regular rhythm and normal heart sounds.    Pulmonary/Chest: Effort normal and breath sounds normal. He has no rales.   Abdominal: He exhibits no distension. There is no tenderness.   Musculoskeletal: Normal range of motion. He exhibits no edema.   Lymphadenopathy:     He has no  cervical adenopathy.   Skin: Skin is warm and dry.   PICC line removed intact at 45 cm.   Psychiatric: He has a normal mood and affect. His behavior is normal. Judgment and thought content normal.   Nursing note and vitals reviewed.        Assessment:       1. 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 Fungal infection of lung    3. PICC (peripherally inserted central catheter) removal          Plan:       I reviewed the CT results and discussed the incidental finding of fatty liver. He has normal LFT's while on fluconazole and does not need additional evaluation at this time.  He will reduce his fluconazole dose to 400 mg daily  PICC was removed - he will stop Unasyn and start oral doxycycline for Actinomycosis.  We discussed follow up with serology and CXR, then CT, then possibly bronchoscopy for residual lesions in the LLL (if still visible in 6 months).  He expressed understanding and agreement and will have monthly labs for ESR, CRP while on therapy to evaluate his progress.

## 2017-09-20 LAB
ACID FAST MOD KINY STN SPEC: NORMAL
ACID FAST MOD KINY STN SPEC: NORMAL
MYCOBACTERIUM SPEC QL CULT: NORMAL
MYCOBACTERIUM SPEC QL CULT: NORMAL

## 2017-11-01 ENCOUNTER — PATIENT MESSAGE (OUTPATIENT)
Dept: INFECTIOUS DISEASES | Facility: CLINIC | Age: 62
End: 2017-11-01

## 2017-11-01 DIAGNOSIS — A42.9 ACTINOMYCOSIS DUE TO ACTINOMYCES ODONTOLYTICUS: Primary | ICD-10-CM

## 2017-11-01 DIAGNOSIS — R91.8 LUNG MASS: ICD-10-CM

## 2017-11-01 DIAGNOSIS — B49 FUNGAL INFECTION OF LUNG: ICD-10-CM

## 2017-11-21 ENCOUNTER — PATIENT MESSAGE (OUTPATIENT)
Dept: FAMILY MEDICINE | Facility: CLINIC | Age: 62
End: 2017-11-21

## 2017-11-21 ENCOUNTER — LAB VISIT (OUTPATIENT)
Dept: LAB | Facility: HOSPITAL | Age: 62
End: 2017-11-21
Attending: INTERNAL MEDICINE
Payer: COMMERCIAL

## 2017-11-21 DIAGNOSIS — B49 FUNGAL INFECTION OF LUNG: ICD-10-CM

## 2017-11-21 DIAGNOSIS — R91.8 LUNG MASS: ICD-10-CM

## 2017-11-21 DIAGNOSIS — A42.9 ACTINOMYCOSIS DUE TO ACTINOMYCES ODONTOLYTICUS: ICD-10-CM

## 2017-11-21 LAB
ALBUMIN SERPL BCP-MCNC: 3.5 G/DL
ALP SERPL-CCNC: 51 U/L
ALT SERPL W/O P-5'-P-CCNC: 38 U/L
ANION GAP SERPL CALC-SCNC: 8 MMOL/L
AST SERPL-CCNC: 32 U/L
BASOPHILS # BLD AUTO: 0.07 K/UL
BASOPHILS NFR BLD: 1.1 %
BILIRUB SERPL-MCNC: 0.5 MG/DL
BUN SERPL-MCNC: 18 MG/DL
CALCIUM SERPL-MCNC: 9.6 MG/DL
CHLORIDE SERPL-SCNC: 102 MMOL/L
CO2 SERPL-SCNC: 28 MMOL/L
CREAT SERPL-MCNC: 1.1 MG/DL
CRP SERPL-MCNC: 2.4 MG/L
DIFFERENTIAL METHOD: NORMAL
EOSINOPHIL # BLD AUTO: 0.3 K/UL
EOSINOPHIL NFR BLD: 4.1 %
ERYTHROCYTE [DISTWIDTH] IN BLOOD BY AUTOMATED COUNT: 13.5 %
ERYTHROCYTE [SEDIMENTATION RATE] IN BLOOD BY WESTERGREN METHOD: 2 MM/HR
EST. GFR  (AFRICAN AMERICAN): >60 ML/MIN/1.73 M^2
EST. GFR  (NON AFRICAN AMERICAN): >60 ML/MIN/1.73 M^2
GLUCOSE SERPL-MCNC: 83 MG/DL
HCT VFR BLD AUTO: 41.8 %
HGB BLD-MCNC: 14.3 G/DL
IMM GRANULOCYTES # BLD AUTO: 0.01 K/UL
IMM GRANULOCYTES NFR BLD AUTO: 0.2 %
LYMPHOCYTES # BLD AUTO: 1.8 K/UL
LYMPHOCYTES NFR BLD: 27.8 %
MCH RBC QN AUTO: 29.4 PG
MCHC RBC AUTO-ENTMCNC: 34.2 G/DL
MCV RBC AUTO: 86 FL
MONOCYTES # BLD AUTO: 1 K/UL
MONOCYTES NFR BLD: 15 %
NEUTROPHILS # BLD AUTO: 3.4 K/UL
NEUTROPHILS NFR BLD: 51.8 %
NRBC BLD-RTO: 0 /100 WBC
PLATELET # BLD AUTO: 252 K/UL
PMV BLD AUTO: 11.3 FL
POTASSIUM SERPL-SCNC: 4.6 MMOL/L
PROT SERPL-MCNC: 7.5 G/DL
RBC # BLD AUTO: 4.87 M/UL
SODIUM SERPL-SCNC: 138 MMOL/L
WBC # BLD AUTO: 6.52 K/UL

## 2017-11-21 PROCEDURE — 80053 COMPREHEN METABOLIC PANEL: CPT

## 2017-11-21 PROCEDURE — 85651 RBC SED RATE NONAUTOMATED: CPT

## 2017-11-21 PROCEDURE — 86140 C-REACTIVE PROTEIN: CPT

## 2017-11-21 PROCEDURE — 36415 COLL VENOUS BLD VENIPUNCTURE: CPT | Mod: PO

## 2017-11-21 PROCEDURE — 85025 COMPLETE CBC W/AUTO DIFF WBC: CPT

## 2018-01-05 ENCOUNTER — PATIENT MESSAGE (OUTPATIENT)
Dept: INFECTIOUS DISEASES | Facility: CLINIC | Age: 63
End: 2018-01-05

## 2018-01-09 ENCOUNTER — PATIENT MESSAGE (OUTPATIENT)
Dept: INFECTIOUS DISEASES | Facility: CLINIC | Age: 63
End: 2018-01-09

## 2018-01-09 DIAGNOSIS — A42.9 ACTINOMYCOSIS DUE TO ACTINOMYCES ODONTOLYTICUS: Primary | ICD-10-CM

## 2018-01-09 DIAGNOSIS — B49 FUNGAL INFECTION OF LUNG: ICD-10-CM

## 2018-01-26 ENCOUNTER — PATIENT MESSAGE (OUTPATIENT)
Dept: INFECTIOUS DISEASES | Facility: CLINIC | Age: 63
End: 2018-01-26

## 2018-01-26 ENCOUNTER — LAB VISIT (OUTPATIENT)
Dept: LAB | Facility: HOSPITAL | Age: 63
End: 2018-01-26
Attending: INTERNAL MEDICINE
Payer: COMMERCIAL

## 2018-01-26 DIAGNOSIS — B49 FUNGAL INFECTION OF LUNG: ICD-10-CM

## 2018-01-26 DIAGNOSIS — A42.9 ACTINOMYCOSIS DUE TO ACTINOMYCES ODONTOLYTICUS: Primary | ICD-10-CM

## 2018-01-26 DIAGNOSIS — A42.9 ACTINOMYCOSIS DUE TO ACTINOMYCES ODONTOLYTICUS: ICD-10-CM

## 2018-01-26 LAB
ALBUMIN SERPL BCP-MCNC: 3.8 G/DL
ALP SERPL-CCNC: 54 U/L
ALT SERPL W/O P-5'-P-CCNC: 35 U/L
ANION GAP SERPL CALC-SCNC: 9 MMOL/L
AST SERPL-CCNC: 29 U/L
BASOPHILS # BLD AUTO: 0.04 K/UL
BASOPHILS NFR BLD: 0.7 %
BILIRUB SERPL-MCNC: 0.8 MG/DL
BUN SERPL-MCNC: 14 MG/DL
CALCIUM SERPL-MCNC: 9.5 MG/DL
CHLORIDE SERPL-SCNC: 103 MMOL/L
CHOLEST SERPL-MCNC: 236 MG/DL
CHOLEST/HDLC SERPL: 6.6 {RATIO}
CO2 SERPL-SCNC: 26 MMOL/L
CREAT SERPL-MCNC: 1 MG/DL
CRP SERPL-MCNC: 3.2 MG/L
DIFFERENTIAL METHOD: NORMAL
EOSINOPHIL # BLD AUTO: 0.1 K/UL
EOSINOPHIL NFR BLD: 1.8 %
ERYTHROCYTE [DISTWIDTH] IN BLOOD BY AUTOMATED COUNT: 13.4 %
ERYTHROCYTE [SEDIMENTATION RATE] IN BLOOD BY WESTERGREN METHOD: 5 MM/HR
EST. GFR  (AFRICAN AMERICAN): >60 ML/MIN/1.73 M^2
EST. GFR  (NON AFRICAN AMERICAN): >60 ML/MIN/1.73 M^2
GLUCOSE SERPL-MCNC: 170 MG/DL
HCT VFR BLD AUTO: 40.5 %
HDLC SERPL-MCNC: 36 MG/DL
HDLC SERPL: 15.3 %
HGB BLD-MCNC: 14 G/DL
IMM GRANULOCYTES # BLD AUTO: 0.01 K/UL
IMM GRANULOCYTES NFR BLD AUTO: 0.2 %
LDLC SERPL CALC-MCNC: 131.8 MG/DL
LYMPHOCYTES # BLD AUTO: 1.6 K/UL
LYMPHOCYTES NFR BLD: 27.6 %
MCH RBC QN AUTO: 30.2 PG
MCHC RBC AUTO-ENTMCNC: 34.6 G/DL
MCV RBC AUTO: 87 FL
MONOCYTES # BLD AUTO: 0.6 K/UL
MONOCYTES NFR BLD: 10.4 %
NEUTROPHILS # BLD AUTO: 3.4 K/UL
NEUTROPHILS NFR BLD: 59.3 %
NONHDLC SERPL-MCNC: 200 MG/DL
NRBC BLD-RTO: 0 /100 WBC
PLATELET # BLD AUTO: 238 K/UL
PMV BLD AUTO: 10.4 FL
POTASSIUM SERPL-SCNC: 3.6 MMOL/L
PROT SERPL-MCNC: 7.3 G/DL
RBC # BLD AUTO: 4.64 M/UL
SODIUM SERPL-SCNC: 138 MMOL/L
TRIGL SERPL-MCNC: 341 MG/DL
WBC # BLD AUTO: 5.65 K/UL

## 2018-01-26 PROCEDURE — 80053 COMPREHEN METABOLIC PANEL: CPT

## 2018-01-26 PROCEDURE — 85025 COMPLETE CBC W/AUTO DIFF WBC: CPT

## 2018-01-26 PROCEDURE — 80061 LIPID PANEL: CPT

## 2018-01-26 PROCEDURE — 85651 RBC SED RATE NONAUTOMATED: CPT

## 2018-01-26 PROCEDURE — 82040 ASSAY OF SERUM ALBUMIN: CPT

## 2018-01-26 PROCEDURE — 86140 C-REACTIVE PROTEIN: CPT

## 2018-01-29 ENCOUNTER — PATIENT MESSAGE (OUTPATIENT)
Dept: FAMILY MEDICINE | Facility: CLINIC | Age: 63
End: 2018-01-29

## 2018-02-02 LAB
ALBUMIN SERPL-MCNC: 4.5 G/DL (ref 3.6–5.1)
SHBG SERPL-SCNC: 46 NMOL/L (ref 22–77)
TESTOST FREE SERPL-MCNC: 42.3 PG/ML (ref 46–224)
TESTOST SERPL-MCNC: 426 NG/DL (ref 250–1100)
TESTOSTERONE.FREE+WB SERPL-MCNC: 87 NG/DL (ref 110–575)

## 2018-02-06 RX ORDER — PRAVASTATIN SODIUM 80 MG/1
TABLET ORAL
Qty: 90 TABLET | Refills: 0 | Status: SHIPPED | OUTPATIENT
Start: 2018-02-06 | End: 2018-03-06 | Stop reason: SDUPTHER

## 2018-02-27 ENCOUNTER — PATIENT MESSAGE (OUTPATIENT)
Dept: INFECTIOUS DISEASES | Facility: CLINIC | Age: 63
End: 2018-02-27

## 2018-02-28 ENCOUNTER — HOSPITAL ENCOUNTER (OUTPATIENT)
Dept: RADIOLOGY | Facility: HOSPITAL | Age: 63
Discharge: HOME | End: 2018-02-28
Attending: INTERNAL MEDICINE
Payer: COMMERCIAL

## 2018-02-28 DIAGNOSIS — A42.9 ACTINOMYCOSIS DUE TO ACTINOMYCES ODONTOLYTICUS: ICD-10-CM

## 2018-02-28 DIAGNOSIS — B49 FUNGAL INFECTION OF LUNG: ICD-10-CM

## 2018-02-28 PROCEDURE — 71250 CT THORAX DX C-: CPT | Mod: TC

## 2018-02-28 PROCEDURE — 71250 CT THORAX DX C-: CPT | Mod: 26,,, | Performed by: RADIOLOGY

## 2018-03-05 ENCOUNTER — PATIENT MESSAGE (OUTPATIENT)
Dept: INFECTIOUS DISEASES | Facility: CLINIC | Age: 63
End: 2018-03-05

## 2018-03-06 ENCOUNTER — OFFICE VISIT (OUTPATIENT)
Dept: INFECTIOUS DISEASES | Facility: CLINIC | Age: 63
End: 2018-03-06
Payer: COMMERCIAL

## 2018-03-06 VITALS
TEMPERATURE: 98 F | SYSTOLIC BLOOD PRESSURE: 130 MMHG | WEIGHT: 184.06 LBS | BODY MASS INDEX: 26.35 KG/M2 | DIASTOLIC BLOOD PRESSURE: 77 MMHG | HEART RATE: 96 BPM | HEIGHT: 70 IN

## 2018-03-06 DIAGNOSIS — A42.9 ACTINOMYCOSIS DUE TO ACTINOMYCES ODONTOLYTICUS: Primary | ICD-10-CM

## 2018-03-06 DIAGNOSIS — R91.8 LUNG MASS: ICD-10-CM

## 2018-03-06 DIAGNOSIS — R91.8 LUNG NODULES: ICD-10-CM

## 2018-03-06 DIAGNOSIS — B49 FUNGAL INFECTION OF LUNG: ICD-10-CM

## 2018-03-06 PROBLEM — Z45.2 PICC (PERIPHERALLY INSERTED CENTRAL CATHETER) REMOVAL: Status: RESOLVED | Noted: 2017-09-19 | Resolved: 2018-03-06

## 2018-03-06 PROCEDURE — 99213 OFFICE O/P EST LOW 20 MIN: CPT | Mod: S$GLB,,, | Performed by: INTERNAL MEDICINE

## 2018-03-06 PROCEDURE — 99999 PR PBB SHADOW E&M-EST. PATIENT-LVL II: CPT | Mod: PBBFAC,,, | Performed by: INTERNAL MEDICINE

## 2018-03-06 NOTE — PROGRESS NOTES
Subjective:      Patient ID: Dariusz Urbina is a 62 y.o. male.    Chief Complaint:No chief complaint on file.      History of Present Illness    Complains of shortness of breath with some exercise.    Shoulder/deltoid pain - stopped pravastatin 1 month ago.  CT shows significant improvement from June 2017    No fever or chills. No sputum or hemoptysis.    Review of Systems   Constitution: Negative for chills, decreased appetite, fever, weakness, malaise/fatigue, night sweats, weight gain and weight loss.   HENT: Negative for congestion, ear pain, hearing loss, hoarse voice, sore throat and tinnitus.    Eyes: Negative for blurred vision, redness and visual disturbance.   Cardiovascular: Negative for chest pain, leg swelling and palpitations.   Respiratory: Positive for shortness of breath. Negative for cough, hemoptysis and sputum production.    Hematologic/Lymphatic: Negative for adenopathy. Does not bruise/bleed easily.   Skin: Positive for dry skin. Negative for itching, rash and suspicious lesions.   Musculoskeletal: Positive for myalgias. Negative for back pain, joint pain and neck pain.   Gastrointestinal: Negative for abdominal pain, constipation, diarrhea, heartburn, nausea and vomiting.   Genitourinary: Negative for dysuria, flank pain, frequency, hematuria, hesitancy and urgency.   Neurological: Negative for dizziness, headaches, numbness and paresthesias.   Psychiatric/Behavioral: Negative for depression and memory loss. The patient does not have insomnia and is not nervous/anxious.      Objective:   Physical Exam   Constitutional: He appears well-developed and well-nourished.   Eyes: Conjunctivae and EOM are normal. Pupils are equal, round, and reactive to light.   Pulmonary/Chest: Effort normal.   Nursing note and vitals reviewed.    Assessment:       1. 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 Fungal infection of lung    3. Lung mass    4. Lung nodules          Plan:       Continue  fluconazole for another month - total 9 months  Continue Doxycycline until September  Repeat PFTs this summer.   Repeat CT in September.  Return in 6 months.  I spent over 50% of a 45 minute encounter counseling the patient.

## 2018-03-27 ENCOUNTER — PATIENT MESSAGE (OUTPATIENT)
Dept: FAMILY MEDICINE | Facility: CLINIC | Age: 63
End: 2018-03-27

## 2018-03-27 DIAGNOSIS — Z00.00 ROUTINE MEDICAL EXAM: ICD-10-CM

## 2018-03-27 DIAGNOSIS — Z12.5 SCREENING FOR PROSTATE CANCER: Primary | ICD-10-CM

## 2018-04-23 RX ORDER — DOXYCYCLINE 100 MG/1
CAPSULE ORAL
Qty: 60 CAPSULE | Refills: 0 | Status: SHIPPED | OUTPATIENT
Start: 2018-04-23 | End: 2018-05-24 | Stop reason: SDUPTHER

## 2018-05-24 RX ORDER — DOXYCYCLINE 100 MG/1
CAPSULE ORAL
Qty: 60 CAPSULE | Refills: 0 | Status: SHIPPED | OUTPATIENT
Start: 2018-05-24 | End: 2018-06-07 | Stop reason: SDUPTHER

## 2018-06-07 RX ORDER — DOXYCYCLINE 100 MG/1
100 CAPSULE ORAL EVERY 12 HOURS
Qty: 60 CAPSULE | Refills: 0 | Status: SHIPPED | OUTPATIENT
Start: 2018-06-07 | End: 2019-05-02

## 2018-06-11 RX ORDER — PRAVASTATIN SODIUM 80 MG/1
80 TABLET ORAL DAILY
Qty: 90 TABLET | Refills: 0 | Status: SHIPPED | OUTPATIENT
Start: 2018-06-11 | End: 2019-04-11 | Stop reason: SDUPTHER

## 2018-07-27 ENCOUNTER — PATIENT MESSAGE (OUTPATIENT)
Dept: FAMILY MEDICINE | Facility: CLINIC | Age: 63
End: 2018-07-27

## 2018-07-27 DIAGNOSIS — Z00.00 ROUTINE MEDICAL EXAM: Primary | ICD-10-CM

## 2018-07-30 ENCOUNTER — LAB VISIT (OUTPATIENT)
Dept: LAB | Facility: HOSPITAL | Age: 63
End: 2018-07-30
Attending: INTERNAL MEDICINE
Payer: COMMERCIAL

## 2018-07-30 DIAGNOSIS — Z00.00 ROUTINE MEDICAL EXAM: ICD-10-CM

## 2018-07-30 LAB
25(OH)D3+25(OH)D2 SERPL-MCNC: 39 NG/ML
ALBUMIN SERPL BCP-MCNC: 4 G/DL
ALP SERPL-CCNC: 55 U/L
ALT SERPL W/O P-5'-P-CCNC: 35 U/L
ANION GAP SERPL CALC-SCNC: 8 MMOL/L
AST SERPL-CCNC: 30 U/L
BASOPHILS # BLD AUTO: 0.07 K/UL
BASOPHILS NFR BLD: 1.2 %
BILIRUB SERPL-MCNC: 0.9 MG/DL
BUN SERPL-MCNC: 16 MG/DL
CALCIUM SERPL-MCNC: 9.8 MG/DL
CHLORIDE SERPL-SCNC: 105 MMOL/L
CHOLEST SERPL-MCNC: 214 MG/DL
CHOLEST/HDLC SERPL: 5.4 {RATIO}
CO2 SERPL-SCNC: 27 MMOL/L
COMPLEXED PSA SERPL-MCNC: 1.3 NG/ML
CREAT SERPL-MCNC: 0.9 MG/DL
DIFFERENTIAL METHOD: NORMAL
EOSINOPHIL # BLD AUTO: 0.1 K/UL
EOSINOPHIL NFR BLD: 2.3 %
ERYTHROCYTE [DISTWIDTH] IN BLOOD BY AUTOMATED COUNT: 13 %
EST. GFR  (AFRICAN AMERICAN): >60 ML/MIN/1.73 M^2
EST. GFR  (NON AFRICAN AMERICAN): >60 ML/MIN/1.73 M^2
ESTIMATED AVG GLUCOSE: 128 MG/DL
GLUCOSE SERPL-MCNC: 118 MG/DL
HBA1C MFR BLD HPLC: 6.1 %
HCT VFR BLD AUTO: 44.7 %
HDLC SERPL-MCNC: 40 MG/DL
HDLC SERPL: 18.7 %
HGB BLD-MCNC: 15.3 G/DL
IMM GRANULOCYTES # BLD AUTO: 0.02 K/UL
IMM GRANULOCYTES NFR BLD AUTO: 0.3 %
LDLC SERPL CALC-MCNC: 112.2 MG/DL
LYMPHOCYTES # BLD AUTO: 1.3 K/UL
LYMPHOCYTES NFR BLD: 21.7 %
MCH RBC QN AUTO: 30.9 PG
MCHC RBC AUTO-ENTMCNC: 34.2 G/DL
MCV RBC AUTO: 90 FL
MONOCYTES # BLD AUTO: 0.7 K/UL
MONOCYTES NFR BLD: 11.9 %
NEUTROPHILS # BLD AUTO: 3.8 K/UL
NEUTROPHILS NFR BLD: 62.6 %
NONHDLC SERPL-MCNC: 174 MG/DL
NRBC BLD-RTO: 0 /100 WBC
PLATELET # BLD AUTO: 263 K/UL
PMV BLD AUTO: 10.4 FL
POTASSIUM SERPL-SCNC: 5 MMOL/L
PROT SERPL-MCNC: 7.1 G/DL
RBC # BLD AUTO: 4.95 M/UL
SODIUM SERPL-SCNC: 140 MMOL/L
TRIGL SERPL-MCNC: 309 MG/DL
TSH SERPL DL<=0.005 MIU/L-ACNC: 2.96 UIU/ML
WBC # BLD AUTO: 6.04 K/UL

## 2018-07-30 PROCEDURE — 80053 COMPREHEN METABOLIC PANEL: CPT

## 2018-07-30 PROCEDURE — 83036 HEMOGLOBIN GLYCOSYLATED A1C: CPT

## 2018-07-30 PROCEDURE — 80061 LIPID PANEL: CPT

## 2018-07-30 PROCEDURE — 84153 ASSAY OF PSA TOTAL: CPT

## 2018-07-30 PROCEDURE — 36415 COLL VENOUS BLD VENIPUNCTURE: CPT | Mod: PO

## 2018-07-30 PROCEDURE — 82306 VITAMIN D 25 HYDROXY: CPT

## 2018-07-30 PROCEDURE — 84443 ASSAY THYROID STIM HORMONE: CPT

## 2018-07-30 PROCEDURE — 85025 COMPLETE CBC W/AUTO DIFF WBC: CPT

## 2018-08-06 ENCOUNTER — PATIENT MESSAGE (OUTPATIENT)
Dept: INFECTIOUS DISEASES | Facility: CLINIC | Age: 63
End: 2018-08-06

## 2018-08-06 ENCOUNTER — PATIENT MESSAGE (OUTPATIENT)
Dept: FAMILY MEDICINE | Facility: CLINIC | Age: 63
End: 2018-08-06

## 2018-08-15 DIAGNOSIS — B49 FUNGAL INFECTION OF LUNG: ICD-10-CM

## 2018-08-15 DIAGNOSIS — A42.9 ACTINOMYCOSIS DUE TO ACTINOMYCES ODONTOLYTICUS: ICD-10-CM

## 2018-08-15 DIAGNOSIS — R91.8 LUNG MASS: Primary | ICD-10-CM

## 2018-08-22 ENCOUNTER — PATIENT MESSAGE (OUTPATIENT)
Dept: INFECTIOUS DISEASES | Facility: CLINIC | Age: 63
End: 2018-08-22

## 2018-08-22 ENCOUNTER — PATIENT MESSAGE (OUTPATIENT)
Dept: FAMILY MEDICINE | Facility: CLINIC | Age: 63
End: 2018-08-22

## 2018-08-24 ENCOUNTER — OFFICE VISIT (OUTPATIENT)
Dept: FAMILY MEDICINE | Facility: CLINIC | Age: 63
End: 2018-08-24
Payer: COMMERCIAL

## 2018-08-24 VITALS
TEMPERATURE: 98 F | WEIGHT: 179.88 LBS | OXYGEN SATURATION: 97 % | SYSTOLIC BLOOD PRESSURE: 130 MMHG | DIASTOLIC BLOOD PRESSURE: 82 MMHG | BODY MASS INDEX: 25.75 KG/M2 | HEIGHT: 70 IN | HEART RATE: 76 BPM

## 2018-08-24 DIAGNOSIS — R22.9 SKIN MASS: Primary | ICD-10-CM

## 2018-08-24 PROCEDURE — 99999 PR PBB SHADOW E&M-EST. PATIENT-LVL III: CPT | Mod: PBBFAC,,, | Performed by: NURSE PRACTITIONER

## 2018-08-24 PROCEDURE — 99213 OFFICE O/P EST LOW 20 MIN: CPT | Mod: S$GLB,,, | Performed by: NURSE PRACTITIONER

## 2018-08-24 PROCEDURE — 3008F BODY MASS INDEX DOCD: CPT | Mod: CPTII,S$GLB,, | Performed by: NURSE PRACTITIONER

## 2018-08-24 NOTE — PROGRESS NOTES
This dictation has been generated using Modal Fluency Dictation some phonetic errors may occur. Please contact author for clarification if needed.     Problem List Items Addressed This Visit     None      Visit Diagnoses     Skin mass    -  Primary    Relevant Orders    Ambulatory referral to Dermatology          Orders Placed This Encounter    Ambulatory referral to Dermatology       Skin mass.  Refer to Dermatology for evaluation.  Defer to them on decision about biopsy.  Please note recent history infectious process in the chest thought to be metastatic but confirm infectious an on current antifungal therapy.    No Follow-up on file.    ________________________________________________________________  ________________________________________________________________      Chief Complaint   Patient presents with    lump on sterum     History of present illness  This 63 y.o. presents today for complaint of skin mass on the sternum cyst.  Patient notes presents more recently.  Denies any pain.  He is concerned due to recent history.  Patient had scans and thought to have a metastatic process but biopsy confirmed infection.  He is currently on antifungal therapy into the 20th of September.  Patient denies gland swelling or masses elsewhere.  No oral lesions. No fever chills or sweats.  Complete review of systems otherwise negative.    Past Medical History:   Diagnosis Date    Decreased libido 11/11/2013    Dermatitis 10/12/2012    Hyperlipidemia 11/11/2013       Past Surgical History:   Procedure Laterality Date    COLONOSCOPY      LUMBAR FUSION      titanium rods    TONSILLECTOMY         History reviewed. No pertinent family history.    Social History     Socioeconomic History    Marital status:      Spouse name: None    Number of children: None    Years of education: None    Highest education level: None   Social Needs    Financial resource strain: None    Food insecurity - worry: None    Food  insecurity - inability: None    Transportation needs - medical: None    Transportation needs - non-medical: None   Occupational History    None   Tobacco Use    Smoking status: Former Smoker     Packs/day: 1.00     Years: 10.00     Pack years: 10.00     Last attempt to quit: 1989     Years since quittin.1    Smokeless tobacco: Never Used   Substance and Sexual Activity    Alcohol use: No    Drug use: No    Sexual activity: None   Other Topics Concern    None   Social History Narrative    None       Current Outpatient Medications   Medication Sig Dispense Refill    doxycycline (VIBRAMYCIN) 100 MG Cap Take 1 capsule (100 mg total) by mouth every 12 (twelve) hours. 60 capsule 0    fluconazole (DIFLUCAN) 200 MG Tab Take 2 tablets (400 mg total) by mouth once daily. 120 tablet 1    pravastatin (PRAVACHOL) 80 MG tablet Take 1 tablet (80 mg total) by mouth once daily. 90 tablet 0     No current facility-administered medications for this visit.        Review of patient's allergies indicates:  No Known Allergies    Physical examination  Vitals Reviewed  Gen. Well-dressed well-nourished   Skin warm dry and intact.  No rashes noted. Just to the left of the sternum patient has a small nodule which is not inflamed.  It is mobile.  It is about a 10mm in size.  Neck is supple without adenopathy  Chest.  Respirations are even unlabored.  Lungs are clear to auscultation.  Cardiac regular rate and rhythm.  No chest wall adenopathy noted.  Neuro. Awake alert oriented x4.  Normal judgment and cognition noted.  Extremities no clubbing cyanosis or edema noted.     Call or return to clinic prn if these symptoms worsen or fail to improve as anticipated.

## 2018-08-27 ENCOUNTER — TELEPHONE (OUTPATIENT)
Dept: FAMILY MEDICINE | Facility: CLINIC | Age: 63
End: 2018-08-27

## 2018-08-31 ENCOUNTER — HOSPITAL ENCOUNTER (OUTPATIENT)
Dept: RADIOLOGY | Facility: HOSPITAL | Age: 63
Discharge: HOME | End: 2018-08-31
Attending: INTERNAL MEDICINE
Payer: COMMERCIAL

## 2018-08-31 ENCOUNTER — PATIENT MESSAGE (OUTPATIENT)
Dept: INFECTIOUS DISEASES | Facility: CLINIC | Age: 63
End: 2018-08-31

## 2018-08-31 DIAGNOSIS — R91.8 LUNG MASS: ICD-10-CM

## 2018-08-31 DIAGNOSIS — B49 FUNGAL INFECTION OF LUNG: ICD-10-CM

## 2018-08-31 DIAGNOSIS — A42.9 ACTINOMYCOSIS DUE TO ACTINOMYCES ODONTOLYTICUS: ICD-10-CM

## 2018-08-31 PROCEDURE — 71250 CT THORAX DX C-: CPT | Mod: TC

## 2018-08-31 PROCEDURE — 71250 CT THORAX DX C-: CPT | Mod: 26,,, | Performed by: RADIOLOGY

## 2018-09-03 ENCOUNTER — PATIENT MESSAGE (OUTPATIENT)
Dept: INFECTIOUS DISEASES | Facility: CLINIC | Age: 63
End: 2018-09-03

## 2018-09-05 ENCOUNTER — OFFICE VISIT (OUTPATIENT)
Dept: FAMILY MEDICINE | Facility: CLINIC | Age: 63
End: 2018-09-05
Payer: COMMERCIAL

## 2018-09-05 VITALS
HEART RATE: 87 BPM | SYSTOLIC BLOOD PRESSURE: 124 MMHG | OXYGEN SATURATION: 96 % | BODY MASS INDEX: 26.07 KG/M2 | TEMPERATURE: 98 F | DIASTOLIC BLOOD PRESSURE: 82 MMHG | HEIGHT: 70 IN | WEIGHT: 182.13 LBS

## 2018-09-05 DIAGNOSIS — R73.03 PREDIABETES: ICD-10-CM

## 2018-09-05 DIAGNOSIS — B49 FUNGAL INFECTION OF LUNG: ICD-10-CM

## 2018-09-05 DIAGNOSIS — Z12.5 SCREENING FOR PROSTATE CANCER: ICD-10-CM

## 2018-09-05 DIAGNOSIS — Z00.00 ROUTINE MEDICAL EXAM: Primary | ICD-10-CM

## 2018-09-05 DIAGNOSIS — G57.01 PYRIFORMIS SYNDROME, RIGHT: ICD-10-CM

## 2018-09-05 DIAGNOSIS — E78.5 HYPERLIPIDEMIA, UNSPECIFIED HYPERLIPIDEMIA TYPE: ICD-10-CM

## 2018-09-05 PROCEDURE — 99396 PREV VISIT EST AGE 40-64: CPT | Mod: S$GLB,,, | Performed by: INTERNAL MEDICINE

## 2018-09-05 PROCEDURE — 99999 PR PBB SHADOW E&M-EST. PATIENT-LVL III: CPT | Mod: PBBFAC,,, | Performed by: INTERNAL MEDICINE

## 2018-09-05 RX ORDER — NAPROXEN SODIUM 220 MG/1
81 TABLET, FILM COATED ORAL DAILY
COMMUNITY
End: 2022-01-07

## 2018-09-05 NOTE — PROGRESS NOTES
Chief complaint Physical  -last seen me 2015    Patient is a 63 -year-old white male Here for physical.  2 normal colonoscopies in the past the last being 2012 at the next in 7-10 years..  PSA is normal.  Apparently having several months of sciatica type pain down the right leg when he sits in certain places and positions and apparently has a previous diagnosis of piriformis syndrome.  He had been given exercises.  Anti-inflammatories help.  Occasional half of a Vicodin 2-3 every several months seems to help.  We discussed seeing Pain Management for possible injection    Numerous questions about his interval lung mass issue.  I reviewed all the infectious disease notes and culture reports and explained to him that there is at least something growing on a particular culture looks like he has been treated for that.  He was exercising a lot then was off exercises now trying to get back to Cardio.  He does not feel he can do the 40 min of cardio like he used to.  We discussed possible deconditioning.  We reviewed his pulmonary function test which were normal even before his treatment for his lung condition.  He has no exertional chest pain.  We discussed continued to exercise and assess if his deconditioning.  We can always repeat tests.  We can do an ischemic assessment if indeed there is any other exertional worsening or chest pain.  His nuclear stress test 4/17 was normal.     patient also lately gets a little bit of heaviness and uncomfortable feeling in his lower chest and epigastric area when he lays flat on his back.  If he rolls over to his side it goes away.  It occurs after length for 20 or 30 min.  We discussed it may well be reflux and he can try antacids and some Zantac and so forth and assess response.      ID 3/18  Assessment:       1. 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 Fungal infection of lung    3. Lung mass    4. Lung nodules      Plan:       Continue fluconazole for another month -  total 9 months  Continue Doxycycline until September  Repeat PFTs this summer.   Repeat CT in September.  Return in 6 months.      ROS:   CONST: weight stable. EYES: no vision change. ENT: no sore throat. CV: no chest pain w/ exertion. RESP: no shortness of breath. GI: no nausea, vomiting, diarrhea. No dysphagia. : no urinary issues. MUSCULOSKELETAL: no new myalgias or arthralgias. SKIN: no new changes. NEURO: no focal deficits. PSYCH: no new issues. ENDOCRINE: no polyuria. HEME: no lymph nodes. ALLERGY: no general pruritis.     PAST MEDICAL HISTORY:                                                        1.  Hyperlipidemia.                                                          2.  Back surgery for slipped disk, Dr. Sams.                            3.  History of ED, seen prior by Urology.                                    4.  History of suspicious stress test and atypical chest pain, normal cath  by Dr. Lugo, likely done at [facility].                            5.  Vasectomy.                                                               6.  Normal colonoscopy,  And , 7-10 years                                               7.  Erosive gastropathy, outside EGD along with a normal emptying study.     8.  Slight anemia in , but did donate blood prior.  9.  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                                                                                                    FAMILY HISTORY:  Father  of lung cancer and coronary disease at 73.      Sister  of lung cancer, mom  of breast cancer and she had diabetes.   A brother is apparently okay.                     "                                                                           Gen: no distress  EYES: conjunctiva clear, non-icteric, PERRL  ENT: nose clear, nasal mucosa normal, oropharynx clear and moist, teeth good  NECK:supple, thyroid non-palpable  RESP: effort is good, lungs clear  CV: heart RRR w/o murmur, gallops or rubs; no carotid bruits, no edema  MS: gait normal, no clubbing or cyanosis of the digits,   SKIN: No rashes, warm to touch      Dariusz was seen today for annual exam.    Diagnoses and all orders for this visit:    Routine medical exam , up-to-date on age-appropriate health maintenance, continued to exercise and we will assess if indeed he has developed some deconditioning while off his exercise regimen    Screening for prostate cancer    Hyperlipidemia, unspecified hyperlipidemia type, chronic and stable    Prediabetes, explained the condition, dietary changes and we can repeat the A1c every 3-4 months    Fungal infection of lung, complete therapy, follow-up with Infectious Disease    Pyriformis syndrome, right, ongoing and symptomatic, may well be a candidate for injection, Botox and so forth  -     Ambulatory referral to Pain Clinic     probable GERD, trial of anti acid and Zantac    Clinical it will definitely be sensitive as he expresses significant high anxiety referable to the above issues."This note will not be shared with the patient."  "

## 2018-09-06 RX ORDER — PRAVASTATIN SODIUM 80 MG/1
TABLET ORAL
Qty: 90 TABLET | Refills: 0 | Status: SHIPPED | OUTPATIENT
Start: 2018-09-06 | End: 2019-04-11 | Stop reason: SDUPTHER

## 2018-09-24 RX ORDER — DOXYCYCLINE 100 MG/1
CAPSULE ORAL
Qty: 60 CAPSULE | Refills: 0 | Status: SHIPPED | OUTPATIENT
Start: 2018-09-24 | End: 2019-05-02

## 2018-10-15 ENCOUNTER — TELEPHONE (OUTPATIENT)
Dept: PAIN MEDICINE | Facility: CLINIC | Age: 63
End: 2018-10-15

## 2018-10-26 ENCOUNTER — TELEPHONE (OUTPATIENT)
Dept: PAIN MEDICINE | Facility: CLINIC | Age: 63
End: 2018-10-26

## 2018-10-26 NOTE — TELEPHONE ENCOUNTER
Reminded patient of Pain Management appointment scheduled for Monday at 9a with Dr. Kemp- verbal confirmation received.  Location information also provided.

## 2018-10-29 ENCOUNTER — OFFICE VISIT (OUTPATIENT)
Dept: PAIN MEDICINE | Facility: CLINIC | Age: 63
End: 2018-10-29
Payer: COMMERCIAL

## 2018-10-29 DIAGNOSIS — M25.551 RIGHT HIP PAIN: Primary | ICD-10-CM

## 2018-10-29 PROCEDURE — 99244 OFF/OP CNSLTJ NEW/EST MOD 40: CPT | Mod: S$GLB,,, | Performed by: PAIN MEDICINE

## 2018-10-29 PROCEDURE — 99999 PR PBB SHADOW E&M-EST. PATIENT-LVL III: CPT | Mod: PBBFAC,,, | Performed by: PAIN MEDICINE

## 2018-10-29 NOTE — PROGRESS NOTES
Subjective:     Patient ID: Dariusz Urbina is a 63 y.o. male    Chief Complaint: Low-back Pain (patient dx w/ Pyriformis syndrome Right- patient experiences R leg soreness- patient previously had a spinal fusion of L4-L5 done 12 years ago w/ Dr. Rogers- patient had previously been treated for slip disk- treatment w/ medication, acupunture (not effective), yoga ( temporary ))      Referred by: Nils Espino MD      HPI:    Initial Encounter (10/29/18):  Dariusz Urbina is a 63 y.o. male who presents today with chronic right lateral hip and thigh pain. This pain has been present for over one year. No inciting event or injury. The pain is intermittent and described as achy. He does have some groin pain with external rotation of his right hip. He also notices more pain after riding his motorcycle for a prolonged period. He denies any numbness, tingling, weakness related to this pain. He denies any pain in his back or buttocks. He is s/p lumbar fusion many years ago and reports chronically decreased sensation to the right medial leg.   This pain is described in detail below.    Physical Therapy: One session. Not helpful    Non-pharmacologic Treatment: Rest helps         · TENS? No    Pain Medications:         · Currently taking: Nothing    · Has tried in the past:  NSAIDs, Tylenol    · Has not tried: Opioids, Muscle relaxants, TCAs, SNRIs, anticonvulsants, topical creams    Blood thinners: ASA 81mg daily    Interventional Therapies: None    Relevant Surgeries:   Lumbar fusion    Affecting sleep? No    Affecting daily activities? yes    Depressive symptoms? no          · SI/HI? No    Work status: Employed    Pain Scores:    Best:       1/10  Worst:     3/10  Usually:   2/10  Today:    1/10    Review of Systems   Constitutional: Negative for activity change, appetite change, chills, fatigue, fever and unexpected weight change.   HENT: Negative for hearing loss.    Eyes: Negative for visual disturbance.   Respiratory:  Negative for chest tightness and shortness of breath.    Cardiovascular: Negative for chest pain.   Gastrointestinal: Negative for abdominal pain, constipation, diarrhea, nausea and vomiting.   Genitourinary: Negative for difficulty urinating.   Musculoskeletal: Positive for arthralgias and myalgias. Negative for back pain, gait problem and neck pain.   Skin: Negative for rash.   Neurological: Negative for dizziness, weakness, light-headedness, numbness and headaches.   Psychiatric/Behavioral: Negative for hallucinations, sleep disturbance and suicidal ideas. The patient is not nervous/anxious.        Past Medical History:   Diagnosis Date    Decreased libido 2013    Dermatitis 10/12/2012    Hyperlipidemia 2013       Past Surgical History:   Procedure Laterality Date    BRONCHOSCOPY-OPERATIVE,FLEXIBLE N/A 2017    Performed by [facility] at [facility] OR [address] [place] [address]    COLONOSCOPY      LAVAGE-ALVEOLAR N/A 2017    [facility]    LUMBAR FUSION      titanium rods    THORACOSCOPY-VIDEO ASSISTED (VATS) W/WEDGE LUNG RESECTION Right 2017    Performed by [facility]val [facility] at [facility] OR 2ND FLR    TONSILLECTOMY         Social History     Socioeconomic History    Marital status:      Spouse name: Not on file    Number of children: Not on file    Years of education: Not on file    Highest education level: Not on file   Social Needs    Financial resource strain: Not on file    Food insecurity - worry: Not on file    Food insecurity - inability: Not on file    Transportation needs - medical: Not on file    Transportation needs - non-medical: Not on file   Occupational History    Not on file   Tobacco Use    Smoking status: Former Smoker     Packs/day: 1.00     Years: 10.00     Pack years: 10.00     Last attempt to quit: 1989     Years since quittin.3    Smokeless tobacco: Never Used   Substance and Sexual Activity     Alcohol use: No    Drug use: No    Sexual activity: Not on file   Other Topics Concern    Not on file   Social History Narrative    Not on file       Review of patient's allergies indicates:  No Known Allergies    Current Outpatient Medications on File Prior to Visit   Medication Sig Dispense Refill    aspirin 81 MG Chew Take 81 mg by mouth once daily.      pravastatin (PRAVACHOL) 80 MG tablet Take 1 tablet (80 mg total) by mouth once daily. 90 tablet 0    pravastatin (PRAVACHOL) 80 MG tablet TAKE 1 TABLET BY MOUTH EVERY DAY 90 tablet 0    doxycycline (VIBRAMYCIN) 100 MG Cap Take 1 capsule (100 mg total) by mouth every 12 (twelve) hours. 60 capsule 0    doxycycline (VIBRAMYCIN) 100 MG Cap TAKE 1 CAPSULE(100 MG) BY MOUTH TWICE DAILY 60 capsule 0    fluconazole (DIFLUCAN) 200 MG Tab Take 2 tablets (400 mg total) by mouth once daily. 120 tablet 1     No current facility-administered medications on file prior to visit.        Objective:      There were no vitals taken for this visit.    Exam:  GEN:  Well developed, well nourished.  No acute distress. Normal pain behavior.  HEENT:  No trauma.  Mucous membranes moist.  Nares patent bilaterally.  PSYCH: Normal affect. Thought content appropriate.  CHEST:  Breathing symmetric.  No audible wheezing.  ABD: Soft, non-distended.  SKIN:  Warm, pink, dry.  No rash on exposed areas.    EXT:  No cyanosis, clubbing, or edema.  No color change or changes in nail or hair growth.  NEURO/MUSCULOSKELETAL:  Fully alert, oriented, and appropriate. Speech normal isis. No cranial nerve deficits.   Gait: Normal.  No trendelenburg sign bilaterally.   Motor Strength: 5/5 motor strength throughout lower extremities.   Sensory: No sensory deficit in the lower extremities.   Reflexes:  2+ and symmetric throughout.  Downgoing Babinski's bilaterally.  No clonus or spasticity.  L-Spine:  Full ROM without pain. Negative facet loading bilaterally.  NEgative SLR bilaterally.    SI  Joint/Hip: Negative JERMAINE bilaterally.  NEgative FADIR bilaterally.  No TTP over lumbar paraspinals, bilateral SI joints, hips, piriformis muscles, or GTB.      Limited IR right hip without pain  Pain slightly reproduced with internal rotation and flexion of right hip.        Imaging:  No pertinent imaging available at this time.     Assessment:       Encounter Diagnosis   Name Primary?    Right hip pain Yes         Plan:       Dariusz was seen today for low-back pain.    Diagnoses and all orders for this visit:    Right hip pain  -     X-Ray Hip 2 View Right; Future  -     Ambulatory Referral to Physical/Occupational Therapy        Dariusz Urbina is a 63 y.o. male with chronic right hip. Possibly due to underlying OA or may be from isolated ITB syndrome.     1. Right hip xrays today.  2. Refer to PT for ROM, strengthening, stretching and HEP.  3. RTC in 8 weeks. May consider hip injection depending on imaging and progress with PT.

## 2018-10-29 NOTE — LETTER
October 29, 2018      Nils Espino MD  [address] [facility]  [place] [address]           [facility]  [address] [facility]  [place] [address]  Phone: [phone]  Fax: [phone]          Patient: Dariusz Urbina   MR Number: [id number]   YOB: 1955   Date of Visit: 10/29/2018       Dear Dr. Nils Espino:    Thank you for referring Dariusz Urbina to me for evaluation. Attached you will find relevant portions of my assessment and plan of care.    If you have questions, please do not hesitate to call me. I look forward to following Dariusz Urbina along with you.    Sincerely,    Sahil Kemp Jr., MD    Enclosure  CC:  No Recipients    If you would like to receive this communication electronically, please contact [email or web address] or [phone] to request more information on [facility] access.    For providers and/or their staff who would like to refer a patient to [facility], please contact us through our one-stop-shop provider referral line, [facility], at [phone].    If you feel you have received this communication in error or would no longer like to receive these types of communications, please e-mail [email or web address]

## 2018-11-20 ENCOUNTER — CLINICAL SUPPORT (OUTPATIENT)
Dept: REHABILITATION | Facility: HOSPITAL | Age: 63
End: 2018-11-20
Attending: PAIN MEDICINE
Payer: COMMERCIAL

## 2018-11-20 DIAGNOSIS — R53.1 DECREASED STRENGTH: ICD-10-CM

## 2018-11-20 DIAGNOSIS — M25.661 DECREASED RANGE OF MOTION OF RIGHT LOWER EXTREMITY: ICD-10-CM

## 2018-11-20 DIAGNOSIS — Z78.9 IMPAIRED MOTOR CONTROL: ICD-10-CM

## 2018-11-20 DIAGNOSIS — M25.551 PAIN IN RIGHT HIP: ICD-10-CM

## 2018-11-20 PROCEDURE — 97110 THERAPEUTIC EXERCISES: CPT | Mod: PN

## 2018-11-20 PROCEDURE — 97161 PT EVAL LOW COMPLEX 20 MIN: CPT | Mod: PN

## 2018-11-20 NOTE — PLAN OF CARE
Physical Therapy Evaluation    Name: Dariusz Urbina  Clinic Number: [id number]    Medical Diagnosis:   Encounter Diagnoses   Name Primary?    Decreased range of motion of right lower extremity     Pain in right hip     Decreased strength     Impaired motor control      Physician: Sahil Kemp Jr*  Treatment Orders: PT Eval and Treat    Past Medical History:   Diagnosis Date    Decreased libido 11/11/2013    Dermatitis 10/12/2012    Hyperlipidemia 11/11/2013     Current Outpatient Medications   Medication Sig    aspirin 81 MG Chew Take 81 mg by mouth once daily.    doxycycline (VIBRAMYCIN) 100 MG Cap Take 1 capsule (100 mg total) by mouth every 12 (twelve) hours.    doxycycline (VIBRAMYCIN) 100 MG Cap TAKE 1 CAPSULE(100 MG) BY MOUTH TWICE DAILY    fluconazole (DIFLUCAN) 200 MG Tab Take 2 tablets (400 mg total) by mouth once daily.    pravastatin (PRAVACHOL) 80 MG tablet Take 1 tablet (80 mg total) by mouth once daily.    pravastatin (PRAVACHOL) 80 MG tablet TAKE 1 TABLET BY MOUTH EVERY DAY     No current facility-administered medications for this visit.      Review of patient's allergies indicates:  No Known Allergies    Precautions: L4-L5 done 12 years ago w/ Dr. Rogers-    Evaluation Date: 11/20/2018  Visit # authorized: 20  Authorization period: 12/31/2018    Charisma Arzola is a 63 y.o. male that presents to [facility] outpatient clinic secondary to has a history of back surgery. Patient states that x-rays were taken and indicated right hip. Heel pain with cushion in heel that helped with his pain medication. UnRx hydrocondoine and ibprofen. Pain in groin area. Snapping sensation. June 2017 had lung infection and took antibiotics. Patient states he gets a little out of breath some days. Doing some exercises look up online such as quad exercises, squats        Patient c/o: continuous/intermittent symptoms  Radicular symptoms: none reported   Onset:: insidious/sudden/gradual   Pain Scale: Dariusz becerra  "pain on a scale of 0-10 to be 6 at worst; 0 currently; 0 at best .  Aggravating factors: Sitting in a hard chair is uncomfortable, using the elliptical, snapping when pulling it back it will go up to about an 8 but only for a minute    Relieving factors: Hydrocodone 1/2 at night, sometimes will take ibuprofen during the day    Previous treatment: Injection in hip and pain medication   Imaging: present in chart and indicates "Skeletal structures at the hips are intact.  Severe degenerative joint disease is present at the right hip with near obliteration of joint space in the weight-bearing area, sclerosis, and marginal spurring.  The left hip is better with the minimal narrowing of the joint space.  The SI joints are unremarkable.  Hardware is noted at the lower lumbar spine.  Prostate calcifications are incidentally noted."   Past surgical history: none   Functional deficits: Nothing that he can't do he just works through the pain   Prior level of function: IND  Environment: Not severe pain in hip,   No cultural or spiritual barriers identified to treatment or learning.  Patient's goals: Patient would like to prolong surgery.       Objective     Observation: Patient presents with non antalgic gait     Posture: Within normal limits     Hip Range of Motion:   Left active Left Passive Right active  Right Passive   Flexion 110 115 100 102   Ext. Rotation 45  40    Int. Rotation 30  20        Lower Extremity Strength   Right LE  Left LE   Knee extension: 5/5 Knee extension: 5/5   Knee flexion: 5/5 Knee flexion: 5/5   Hip flexion: 4+/5 Hip flexion: 5/5   Hip Internal Rotation:  5/5    Hip Internal Rotation: 5/5      Hip External Rotation: 4+/5*    Hip External Rotation: 5/5      Hip extension:  4/5 Hip extension: 4/5   Hip abduction: 4+/5 Hip abduction: 5/5   Hip adduction: 4+/5 Hip adduction: 5/5   Ankle dorsiflexion: 5/5 Ankle dorsiflexion: 5/5   Ankle plantarflexion: 5/5 Ankle plantarflexion: 5/5     Special " Tests:  Bridge Test: Negative   JERMAINE: Positive on right  FADIR: Positive  Scour: Not able to test secondary to pain present in knee      Flexibility:    Right Left   Ely's Test  Negative Negative    Supine 90/90 20 25   Charly's Test NT Negative        Joint Mobility: Decreased mobility of right hip with reproduction of pain symptoms     Palpation: Patient did not present with tenderness but did have pain present in groin area of right hip     Functional Limitations Reports - G Codes  Category: Mobility   Intake 40% Current Status CK -    Predicted 29% Goal Status+ CJ -     PT Evaluation Completed? Yes  Discussed Plan of Care with patient: Yes    TREATMENT:  Dariusz received therapeutic exercises to develop strength and endurance, flexibility for 15 minutes including:  Active hamstring stretch 10 times 5 sec holds ea  Piriformis stretch 3 x 20 sec holds ea  Bridges 2 x 15 reps   SLR 2 x 15 reps right  Prone hip extension 2 x 15 reps ea   SL hip abduction 2 x 15 reps right     Dariusz  received the following manual therapy techniques x 00 min. To include Joint mobilizations and Soft tissue Mobilization were applied to the: Right hip To include: Long axis joint distraction     HEP Provided: See patient instruction   Instructed patient regarding: Proper technique with all exercises. Patient demo good understanding of the education provided. Dariusz demonstrated good return demonstration of activities.  Pt/family was provided educational information, including: role of PT, goals for PT, scheduling - patient verbalized understanding. Discussed insurance limitations with pt.     Pt has no cultural, educational or language barriers to learning provided.    Assessment     This is a 63 y.o. male referred to outpatient physical therapy and presents with a medical diagnosis of right hip pain and demonstrates limitations as described in the problem list. Patient presents to clinic with signs and symptoms of hip dysfunction  on the right. Patient will benefit from activities that will promote greater strength of hip muscles especially with hip extension. Patient has past lumbar surgery that may also contribute to pain and stiffness present in right hip. Patient will benefit from physcial therapy services in order to maximize pain free and/or functional use of right LE. The following goals were discussed with the patient and patient is in agreement with them as to be addressed in the treatment plan. Patient was given a HEP consisting of exercises listed above. Patient verbally understood the instructions as they were given and demonstrated proper form and technique during therapy. Patient was advised to perform these exercises free of pain, and to stop performing them if pain occurs. Patient would benefit from skilled PT to address above stated problems, as well as, achieve pt goals within a timely manner. Patient has set realistic goals and has verbalized good understanding and agreement with reported diagnosis, prognosis and treatment. Patient demonstrates no additional cultural, spiritual or educational need and currently has no barriers to learning.      History  Co-morbidities and personal factors that may impact the plan of care Examination  Body Structures and Functions, activity limitations and participation restrictions that may impact the plan of care Clinical Presentation   Decision Making/ Complexity Score   Co-morbidities:   Dermatitis   Hyperlipidemia     Personal Factors:   Age 63  Lifestyle: Lightly active   Attitudes: Pleasant    Body Regions: LE     Body Systems: Musculoskeletal (symmetry, ROM, strength, flexibility, joint mobility), Neuromuscular (coordination, posture, balance, gait, transfers, motor control/learning), Cardiovascular (endurance)    Activity limitations: Not limited but has the presence of pain in LE with certain activities     Participation Restrictions: Patient indicates he does not have any  limitations just presence of pain when he rides his motorcycle    Stable clinical presentation with stable clinical characteristics    Pain: 6/10 activity pain   Complexity:  Low     Functional Outcome measure  FOTO limitation: 40 % disability         Prognosis: Good    Medical necessity is demonstrated by the following IMPAIRMENTS/PROBLEM LIST:   1) Increase in pain level limiting function   2) Decreased strength   3) Impaired motor control   4) Decreased flexibility    5) Lack of HEP    Anticipated barriers to physical therapy: none    Pt's spiritual, cultural and educational needs considered and pt agreeable to plan of care and goals as stated below:     GOALS: Short Term Goals:  4 weeks  1. Patient will be able to report decreased in active right hip pain  <   / =  4/10  to increase tolerance for daily motorcycle activities.   2. Patient will be able to report a 30% improvement in ability to engage in motorcycle rides without the presence of pain.   3. Patient will demonstrate an increased MMT in hip extension to 4+/5  to increase tolerance for walking and riding.   4. Patient will be able to demonstrate an increase in 5 degrees of hamstring flexibility on L LE to improve mobility.   5. Patient will be able to tolerate HEP to improve ROM and independence with ADL's    Long Term Goals: 8 weeks  1. Patient will be able to report decreased in active right hip pain  <   / =  2 /10  to increase tolerance for daily motorcycle activities.   2. Patient will be able to report a 60% improvement in ability to engage in motorcycle rides without the presence of pain.   3. Patient will demonstrate an increased MMT in hip extension to 5/5  to increase tolerance for walking and riding.   4. Patient will be able to demonstrate an increase in 10 degrees of hamstring flexibility on L LE to improve mobility.   5. Patient will report at CJ level (20-40% impaired) on LEFS  to demonstrate increase in LE function with every day tasks.    6. Patient to be Independent with HEP to improve ROM and independence with ADL's    Plan     Patient will be treated by physical therapy 1-2 times a week for 12 weeks for Electrical Stimulation PRN, Iontophoresis (with dexamethasone PRN), Manual Therapy, Moist Heat/ Ice, Neuromuscular Re-ed, Patient Education, Therapeutic Activites, Therapeutic Exercise and Other therapeutic taping, dry needling, aquatic therapy to achieve established goals. Dariusz may at times be seen by a PTA as part of the Rehab Team.      Cont PT 1-3 times a week for 12 weeks during the certification period (11/20/2018 - 2/20/2018) PN Due: (12/20/2018) .      I certify the need for these services furnished under this plan of treatment and while under my care.______________________________ Physician/Referring Practitioner  Date of Signature      Barry Bryan, PT  11/20/2018

## 2018-11-20 NOTE — PROGRESS NOTES
See full Physical Therapy Evaluation in POC     No Known Allergies    Precautions: L4-L5 done 12 years ago w/ Dr. Rogers-    Evaluation Date: 11/20/2018  Visit # authorized: 20  Authorization period: 12/31/2018    TREATMENT:  Dariusz received therapeutic exercises to develop strength and endurance, flexibility for 15 minutes including:  Active hamstring stretch 10 times 5 sec holds ea  Piriformis stretch 3 x 20 sec holds ea  Bridges 2 x 15 reps   SLR 2 x 15 reps right  Prone hip extension 2 x 15 reps ea   SL hip abduction 2 x 15 reps right     Dariusz  received the following manual therapy techniques x 00 min. To include Joint mobilizations and Soft tissue Mobilization were applied to the: Right hip To include: Long axis joint distraction     Prognosis: Good    Medical necessity is demonstrated by the following IMPAIRMENTS/PROBLEM LIST:   1) Increase in pain level limiting function   2) Decreased strength   3) Impaired motor control   4) Decreased flexibility    5) Lack of HEP    Anticipated barriers to physical therapy: none    Pt's spiritual, cultural and educational needs considered and pt agreeable to plan of care and goals as stated below:     GOALS: Short Term Goals:  4 weeks  1. Patient will be able to report decreased in active right hip pain  <   / =  4/10  to increase tolerance for daily motorcycle activities.   2. Patient will be able to report a 30% improvement in ability to engage in motorcycle rides without the presence of pain.   3. Patient will demonstrate an increased MMT in hip extension to 4+/5  to increase tolerance for walking and riding.   4. Patient will be able to demonstrate an increase in 5 degrees of hamstring flexibility on L LE to improve mobility.   5. Patient will be able to tolerate HEP to improve ROM and independence with ADL's    Long Term Goals: 8 weeks  1. Patient will be able to report decreased in active right hip pain  <   / =  2 /10  to increase tolerance for daily motorcycle  activities.   2. Patient will be able to report a 60% improvement in ability to engage in motorcycle rides without the presence of pain.   3. Patient will demonstrate an increased MMT in hip extension to 5/5  to increase tolerance for walking and riding.   4. Patient will be able to demonstrate an increase in 10 degrees of hamstring flexibility on L LE to improve mobility.   5. Patient will report at CJ level (20-40% impaired) on LEFS  to demonstrate increase in LE function with every day tasks.   6. Patient to be Independent with HEP to improve ROM and independence with ADL's    Plan     Cont PT 1-3 times a week for 12 weeks during the certification period (11/20/2018 - 2/20/2018) PN Due: (12/20/2018) .

## 2018-11-27 ENCOUNTER — CLINICAL SUPPORT (OUTPATIENT)
Dept: REHABILITATION | Facility: HOSPITAL | Age: 63
End: 2018-11-27
Attending: PAIN MEDICINE
Payer: COMMERCIAL

## 2018-11-27 DIAGNOSIS — M25.551 PAIN IN RIGHT HIP: ICD-10-CM

## 2018-11-27 DIAGNOSIS — M25.661 DECREASED RANGE OF MOTION OF RIGHT LOWER EXTREMITY: ICD-10-CM

## 2018-11-27 DIAGNOSIS — Z78.9 IMPAIRED MOTOR CONTROL: ICD-10-CM

## 2018-11-27 DIAGNOSIS — R53.1 DECREASED STRENGTH: ICD-10-CM

## 2018-11-27 PROCEDURE — 97110 THERAPEUTIC EXERCISES: CPT | Mod: PN

## 2018-11-27 NOTE — PROGRESS NOTES
Physical Therapy Daily Note   Name: Dariusz Urbina  Clinic Number: [id number]    Diagnosis:   Encounter Diagnoses   Name Primary?    Decreased range of motion of right lower extremity     Pain in right hip     Decreased strength     Impaired motor control      Physician: Sahil Kemp Jr*  Treatment Orders: PT Eval and Treat    Precautions: L4-L5 done 12 years ago w/ Dr. Rogers-  Visit #: 2 of 20  G-code reported: Initial Evaluation (Visit #1)   Certification period: (11/20/2018 - 2/20/2018) PN Due: (12/20/2018) .     Subjective   Dariusz reports: some of the exercises that he has been doing at home has made him really sore.     Pain Scale:  2/10      Objective     Time In: 0800  Time Out:: 0900  Total Treatment time: 60 minutes (1:1 with PT for duration' of treatment session)     Patient began exercise program on upright bike  for 8 minutes.    Patient performed the following therapeutic exercises for 52 minutes in order to strengthen and stretch right:  Active hamstring stretch 10 times 5 sec holds ea  LTR on SB 2 minutes   Piriformis stretch 3 x 20 sec holds ea  Bridges 2 x 15 reps   SLR 2 x 15 reps right 2#  Prone hip extension 2 x 15 reps ea 2#  Prone hip extension c knee bent 2 x 15 reps 2#  SL hip abduction 2 x 15 reps right 2#  +SL hip adduction on bolster 2 x 15 reps 0#  +SL clams 2 x 15 reps c green TB ea  +Supine hip flexor stretch 3 x 30 sec  +SB squats 3 x 10 reps   Shuttle 2 leg press 3 bands 2 x 15 reps     +Double leg balance on foam 3 x 30 sec EC (next visit)        Dariusz  received the following manual therapy techniques x 00 min. To include Joint mobilizations and Soft tissue Mobilization were applied to the: Right hip To include: Long axis joint distraction     Written Home Exercises Provided: Patient educated to continue with previously issued HEP in order to complement therapy sessions.   Exercises were reviewed and Dariusz was able to  demonstrate them prior to the end of the session. Patient received a written copy of exercises to perform at home. Dariusz demonstrated good  understanding of the education provided.     Assessment     Dariusz is progressing well towards his goals. Patient demonstrates poor accuracy in his HEP exercises demonstrated and performed in clinic today. Patient required additional education on how to correctly perform piriformis stretch without producing excessive rotation present in chart.  Patient will continue to benefit from skilled PT services in order to address limitations and education on proper advancement of HEP .     Pt's spiritual, cultural and educational needs considered and pt agreeable to plan of care and goals.    Prognosis: Good    Medical necessity is demonstrated by the following IMPAIRMENTS/PROBLEM LIST:   1) Increase in pain level limiting function   2) Decreased strength   3) Impaired motor control   4) Decreased flexibility    5) Lack of HEP    Anticipated barriers to physical therapy: none    Pt's spiritual, cultural and educational needs considered and pt agreeable to plan of care and goals as stated below:     GOALS: Short Term Goals:  4 weeks  1. Patient will be able to report decreased in active right hip pain  <   / =  4/10  to increase tolerance for daily motorcycle activities.   2. Patient will be able to report a 30% improvement in ability to engage in motorcycle rides without the presence of pain.   3. Patient will demonstrate an increased MMT in hip extension to 4+/5  to increase tolerance for walking and riding.   4. Patient will be able to demonstrate an increase in 5 degrees of hamstring flexibility on L LE to improve mobility.   5. Patient will be able to tolerate HEP to improve ROM and independence with ADL's    Long Term Goals: 8 weeks  1. Patient will be able to report decreased in active right hip pain  <   / =  2 /10  to increase tolerance for daily motorcycle activities.   2.  Patient will be able to report a 60% improvement in ability to engage in motorcycle rides without the presence of pain.   3. Patient will demonstrate an increased MMT in hip extension to 5/5  to increase tolerance for walking and riding.   4. Patient will be able to demonstrate an increase in 10 degrees of hamstring flexibility on L LE to improve mobility.   5. Patient will report at CJ level (20-40% impaired) on LEFS  to demonstrate increase in LE function with every day tasks.   6. Patient to be Independent with HEP to improve ROM and independence with ADL's    Plan   Continue with established Plan of Care towards PT goals. Cont PT 1-3 times a week for 12 weeks during the certification period (11/20/2018 - 2/20/2018) PN Due: (12/20/2018) .            Barry BRUSH Irvin, PT  11/27/2018

## 2018-11-29 ENCOUNTER — CLINICAL SUPPORT (OUTPATIENT)
Dept: REHABILITATION | Facility: HOSPITAL | Age: 63
End: 2018-11-29
Attending: PAIN MEDICINE
Payer: COMMERCIAL

## 2018-11-29 DIAGNOSIS — R53.1 DECREASED STRENGTH: ICD-10-CM

## 2018-11-29 DIAGNOSIS — M25.661 DECREASED RANGE OF MOTION OF RIGHT LOWER EXTREMITY: ICD-10-CM

## 2018-11-29 DIAGNOSIS — Z78.9 IMPAIRED MOTOR CONTROL: ICD-10-CM

## 2018-11-29 DIAGNOSIS — M25.551 PAIN IN RIGHT HIP: ICD-10-CM

## 2018-11-29 PROCEDURE — 97110 THERAPEUTIC EXERCISES: CPT | Mod: PN

## 2018-11-29 NOTE — PROGRESS NOTES
Physical Therapy Daily Note   Name: Dariusz Urbina  Clinic Number: [id number]    Diagnosis:   Encounter Diagnoses   Name Primary?    Decreased range of motion of right lower extremity     Pain in right hip     Decreased strength     Impaired motor control      Physician: Sahil Kemp Jr*  Treatment Orders: PT Eval and Treat    Precautions: L4-L5 done 12 years ago w/ Dr. Rogers-  Visit #: 3 of 20  G-code reported: Initial Evaluation (Visit #1)   Certification period: (11/20/2018 - 2/20/2018) PN Due: (12/20/2018) .     Subjective   Dariusz reports:that he is feeling some tenderness in his right hip.     Pain Scale:  2/10    Objective     Time In: 1330  Time Out:: 1430  Total Treatment time: 60 minutes (1:1 with PT for duration' of treatment session)     Patient began exercise program on upright bike  for 8 minutes.    Patient performed the following therapeutic exercises for 52 minutes in order to strengthen and stretch right:  Active hamstring stretch 10 times 5 sec holds ea  LTR on SB 2 minutes   Piriformis stretch 3 x 20 sec holds ea  Bridges 2 x 15 reps   SLR 2 x 15 reps right 2#  Prone hip extension 2 x 15 reps ea 2#  Prone hip extension c knee bent 2 x 15 reps 2#  SL hip abduction 2 x 15 reps right 2#  +SL hip adduction on bolster 2 x 15 reps 0#  +SL clams 2 x 15 reps c green TB ea  +Supine hip flexor stretch 3 x 30 sec  +SB squats 3 x 10 reps   Standing hip abduction 2 x 15 reps ea  Shuttle 2 leg press 3 bands 2 x 15 reps   +Double leg balance on foam 3 x 30 sec EC (next visit)  Figure four stretch (next visit)       Dariusz  received the following manual therapy techniques x 00 min. To include Joint mobilizations and Soft tissue Mobilization were applied to the: Right hip To include: Long axis joint distraction     Written Home Exercises Provided: Patient educated to continue with previously issued HEP in order to complement therapy sessions.   Exercises  were reviewed and Draiusz was able to demonstrate them prior to the end of the session. Patient received a written copy of exercises to perform at home. Dariusz demonstrated good  understanding of the education provided.     Assessment     Dariusz is progressing well towards his goals. Patient did well with activities given in clinic today. Patient did not perform piriformis stretch in order to reduce presence of irritation. Patient will continue to benefit from skilled PT services in order to address limitations and education on proper advancement of HEP .     Pt's spiritual, cultural and educational needs considered and pt agreeable to plan of care and goals.    Prognosis: Good    Medical necessity is demonstrated by the following IMPAIRMENTS/PROBLEM LIST:   1) Increase in pain level limiting function   2) Decreased strength   3) Impaired motor control   4) Decreased flexibility    5) Lack of HEP    Anticipated barriers to physical therapy: none    Pt's spiritual, cultural and educational needs considered and pt agreeable to plan of care and goals as stated below:     GOALS: Short Term Goals:  4 weeks  1. Patient will be able to report decreased in active right hip pain  <   / =  4/10  to increase tolerance for daily motorcycle activities.   2. Patient will be able to report a 30% improvement in ability to engage in motorcycle rides without the presence of pain.   3. Patient will demonstrate an increased MMT in hip extension to 4+/5  to increase tolerance for walking and riding.   4. Patient will be able to demonstrate an increase in 5 degrees of hamstring flexibility on L LE to improve mobility.   5. Patient will be able to tolerate HEP to improve ROM and independence with ADL's    Long Term Goals: 8 weeks  1. Patient will be able to report decreased in active right hip pain  <   / =  2 /10  to increase tolerance for daily motorcycle activities.   2. Patient will be able to report a 60% improvement in ability to engage  in motorcycle rides without the presence of pain.   3. Patient will demonstrate an increased MMT in hip extension to 5/5  to increase tolerance for walking and riding.   4. Patient will be able to demonstrate an increase in 10 degrees of hamstring flexibility on L LE to improve mobility.   5. Patient will report at CJ level (20-40% impaired) on LEFS  to demonstrate increase in LE function with every day tasks.   6. Patient to be Independent with HEP to improve ROM and independence with ADL's    Plan   Continue with established Plan of Care towards PT goals. Cont PT 1-3 times a week for 12 weeks during the certification period (11/20/2018 - 2/20/2018) PN Due: (12/20/2018) .            Barry Bryan, PT  11/29/2018

## 2018-12-05 ENCOUNTER — CLINICAL SUPPORT (OUTPATIENT)
Dept: REHABILITATION | Facility: HOSPITAL | Age: 63
End: 2018-12-05
Attending: PAIN MEDICINE
Payer: COMMERCIAL

## 2018-12-05 DIAGNOSIS — Z78.9 IMPAIRED MOTOR CONTROL: ICD-10-CM

## 2018-12-05 DIAGNOSIS — M25.661 DECREASED RANGE OF MOTION OF RIGHT LOWER EXTREMITY: Primary | ICD-10-CM

## 2018-12-05 DIAGNOSIS — M25.551 PAIN IN RIGHT HIP: ICD-10-CM

## 2018-12-05 DIAGNOSIS — R53.1 DECREASED STRENGTH: ICD-10-CM

## 2018-12-05 PROCEDURE — 97110 THERAPEUTIC EXERCISES: CPT | Mod: PN

## 2018-12-05 NOTE — PROGRESS NOTES
Physical Therapy Daily Note   Name: Dariusz Urbina  Clinic Number: [id number]    Diagnosis:   Encounter Diagnoses   Name Primary?    Decreased range of motion of right lower extremity Yes    Pain in right hip     Decreased strength     Impaired motor control      Physician: Sahil Kemp Jr*  Treatment Orders: PT Eval and Treat    Precautions: L4-L5 done 12 years ago w/ Dr. Rogers-  Visit #: 3 of 20  G-code reported: Initial Evaluation (Visit #1)   Certification period: (11/20/2018 - 2/20/2018) PN Due: (12/20/2018) .     Subjective   Dariusz reports:no change in pain since starting therapy.    Pain Scale:5/10    Objective     Time In: 10:30  Time Out:: 1130  Total Treatment time: 60 minutes (1:1 with PT for duration' of treatment session)     Patient began exercise program on upright bike  for 8 minutes.    Patient performed the following therapeutic exercises for 52 minutes in order to strengthen and stretch right:  Active hamstring stretch 10 times 5 sec holds ea  LTR on SB 2 minutes   Piriformis stretch 3 x 20 sec holds ea  Bridges 2 x 15 reps   SLR 2 x 15 reps right 2#  Prone hip extension 2 x 15 reps ea 2#  Prone hip extension c knee bent 2 x 15 reps 2#  SL hip abduction 2 x 15 reps right 2#  +SL hip adduction on bolster 2 x 15 reps 0#  +SL clams 2 x 15 reps c green TB ea  +Supine hip flexor stretch 3 x 30 sec  +SB squats 3 x 10 reps   Standing hip abduction 2 x 15 reps ea  Shuttle 2 leg press 3 bands 2 x 15 reps   +Double leg balance on foam 3 x 30 sec EC (next visit)  Figure four stretch (next visit)       Dariusz  received the following manual therapy techniques x 00 min. To include Joint mobilizations and Soft tissue Mobilization were applied to the: Right hip To include: Long axis joint distraction     Written Home Exercises Provided: Patient educated to continue with previously issued HEP in order to complement therapy sessions.   Exercises were  reviewed and Dariusz was able to demonstrate them prior to the end of the session. Patient received a written copy of exercises to perform at home. Dariusz demonstrated good  understanding of the education provided.     Assessment     Dariusz tolerated treatment fair today without and increase in symptoms.  Continued to hold off on piriformis stretch due to agitation to R piriformis region.  Encouraged pt to hold off on piriformis and SKTC stretch at home due to complaints of irritation, pt verbalized understanding.  Pt displays improved muscle control to R hip musculature.  However, pt does not see any progression since start of therapy symptoms wise.  Continues to experience increase pain requiring pt to take medication.  Continue to progress as tolerated by pt.  Pt's spiritual, cultural and educational needs considered and pt agreeable to plan of care and goals.    Prognosis: Good    Medical necessity is demonstrated by the following IMPAIRMENTS/PROBLEM LIST:   1) Increase in pain level limiting function   2) Decreased strength   3) Impaired motor control   4) Decreased flexibility    5) Lack of HEP    Anticipated barriers to physical therapy: none    Pt's spiritual, cultural and educational needs considered and pt agreeable to plan of care and goals as stated below:     GOALS: Short Term Goals:  4 weeks  1. Patient will be able to report decreased in active right hip pain  <   / =  4/10  to increase tolerance for daily motorcycle activities.   2. Patient will be able to report a 30% improvement in ability to engage in motorcycle rides without the presence of pain.   3. Patient will demonstrate an increased MMT in hip extension to 4+/5  to increase tolerance for walking and riding.   4. Patient will be able to demonstrate an increase in 5 degrees of hamstring flexibility on L LE to improve mobility.   5. Patient will be able to tolerate HEP to improve ROM and independence with ADL's    Long Term Goals: 8 weeks  1.  Patient will be able to report decreased in active right hip pain  <   / =  2 /10  to increase tolerance for daily motorcycle activities.   2. Patient will be able to report a 60% improvement in ability to engage in motorcycle rides without the presence of pain.   3. Patient will demonstrate an increased MMT in hip extension to 5/5  to increase tolerance for walking and riding.   4. Patient will be able to demonstrate an increase in 10 degrees of hamstring flexibility on L LE to improve mobility.   5. Patient will report at CJ level (20-40% impaired) on LEFS  to demonstrate increase in LE function with every day tasks.   6. Patient to be Independent with HEP to improve ROM and independence with ADL's    Plan   Continue with established Plan of Care towards PT goals. Cont PT 1-3 times a week for 12 weeks during the certification period (11/20/2018 - 2/20/2018) PN Due: (12/20/2018) .            Thu Melvin, PTA  12/5/2018

## 2018-12-07 ENCOUNTER — CLINICAL SUPPORT (OUTPATIENT)
Dept: REHABILITATION | Facility: HOSPITAL | Age: 63
End: 2018-12-07
Attending: PAIN MEDICINE
Payer: COMMERCIAL

## 2018-12-07 DIAGNOSIS — M25.551 PAIN IN RIGHT HIP: ICD-10-CM

## 2018-12-07 DIAGNOSIS — Z78.9 IMPAIRED MOTOR CONTROL: ICD-10-CM

## 2018-12-07 DIAGNOSIS — M25.661 DECREASED RANGE OF MOTION OF RIGHT LOWER EXTREMITY: Primary | ICD-10-CM

## 2018-12-07 DIAGNOSIS — R53.1 DECREASED STRENGTH: ICD-10-CM

## 2018-12-07 PROCEDURE — 97140 MANUAL THERAPY 1/> REGIONS: CPT | Mod: PN

## 2018-12-07 PROCEDURE — 97110 THERAPEUTIC EXERCISES: CPT | Mod: PN

## 2018-12-07 NOTE — PROGRESS NOTES
Physical Therapy Daily Note   Name: Dariusz Urbina  Clinic Number: [id number]    Diagnosis:   Encounter Diagnoses   Name Primary?    Decreased range of motion of right lower extremity Yes    Pain in right hip     Decreased strength     Impaired motor control      Physician: Sahil Kemp Jr*  Treatment Orders: PT Eval and Treat    Precautions: L4-L5 done 12 years ago w/ Dr. Rogers-  Visit #: 5 of 20  G-code reported: Initial Evaluation (Visit #1)   Certification period: (11/20/2018 - 2/20/2018) PN Due: (12/20/2018) .     Subjective   Dariusz reports:he plans to see his doctor on Monday for 1330.    Pain Scale: 3/10  Objective     Time In: 10:16 (Pt 15 minutes late to treatment session)  Time Out:: 1103  Total Treatment time: 47 minutes (1:1 with PTA for 45 minutes of treatment session)     Patient began exercise program on upright bike  for 8 minutes.    Patient performed the following therapeutic exercises for 35 minutes in order to strengthen and stretch right:  Active hamstring stretch 10 times 5 sec holds ea  LTR on SB 2 minutes   Piriformis stretch 3 x 20 sec holds ea  Bridges 2 x 15 reps   SLR 2 x 15 reps right 2#  Prone hip extension 2 x 15 reps ea 2#  Prone hip extension c knee bent 2 x 15 reps 2#  SL hip abduction 2 x 15 reps right 2#  SL hip adduction on bolster 2 x 15 reps 0#  SL clams 2 x 15 reps c green TB ea  Supine hip flexor stretch 3 x 30 sec  SB squats 3 x 10 reps   Standing hip abduction 2 x 15 reps ea  Shuttle 2 leg press 3 bands 2 x 15 reps   +Double leg balance on foam 3 x 30 sec EC (next visit)  Figure four stretch (next visit)       Dariusz  received the following manual therapy techniques x 12 minutes   Right hip To include: Lateral and Long axis joint distraction to R hip     Written Home Exercises Provided: Patient educated to continue with previously issued HEP in order to complement therapy sessions.   Exercises were reviewed and Dariusz  was able to demonstrate them prior to the end of the session. Patient received a written copy of exercises to perform at home. Dariusz demonstrated good  understanding of the education provided.     Assessment   Foto Next Visit (6th V)  Dariusz tolerated treatment good today.  Experienced relief with lateral and long axis traction to R hip region.  Improved tolerance with therex today.    Pt's spiritual, cultural and educational needs considered and pt agreeable to plan of care and goals.    Prognosis: Good    Medical necessity is demonstrated by the following IMPAIRMENTS/PROBLEM LIST:   1) Increase in pain level limiting function   2) Decreased strength   3) Impaired motor control   4) Decreased flexibility    5) Lack of HEP    Anticipated barriers to physical therapy: none    Pt's spiritual, cultural and educational needs considered and pt agreeable to plan of care and goals as stated below:     GOALS: Short Term Goals:  4 weeks  1. Patient will be able to report decreased in active right hip pain  <   / =  4/10  to increase tolerance for daily motorcycle activities.   2. Patient will be able to report a 30% improvement in ability to engage in motorcycle rides without the presence of pain.   3. Patient will demonstrate an increased MMT in hip extension to 4+/5  to increase tolerance for walking and riding.   4. Patient will be able to demonstrate an increase in 5 degrees of hamstring flexibility on L LE to improve mobility.   5. Patient will be able to tolerate HEP to improve ROM and independence with ADL's    Long Term Goals: 8 weeks  1. Patient will be able to report decreased in active right hip pain  <   / =  2 /10  to increase tolerance for daily motorcycle activities.   2. Patient will be able to report a 60% improvement in ability to engage in motorcycle rides without the presence of pain.   3. Patient will demonstrate an increased MMT in hip extension to 5/5  to increase tolerance for walking and riding.   4.  Patient will be able to demonstrate an increase in 10 degrees of hamstring flexibility on L LE to improve mobility.   5. Patient will report at CJ level (20-40% impaired) on LEFS  to demonstrate increase in LE function with every day tasks.   6. Patient to be Independent with HEP to improve ROM and independence with ADL's    Plan   Continue with established Plan of Care towards PT goals. Cont PT 1-3 times a week for 12 weeks during the certification period (11/20/2018 - 2/20/2018) PN Due: (12/20/2018) .            Thu Melvin, PTA  12/7/2018

## 2018-12-11 ENCOUNTER — DOCUMENTATION ONLY (OUTPATIENT)
Dept: REHABILITATION | Facility: HOSPITAL | Age: 63
End: 2018-12-11

## 2018-12-11 NOTE — PROGRESS NOTES
Missed Visit/Cancellation     Date: 12/11/18    Canceled Number: 1             Pt initially had visit scheduled for today at 1000.  Pt called office and cancelled appointment 2/2 to appointment time no longer working.

## 2018-12-13 ENCOUNTER — CLINICAL SUPPORT (OUTPATIENT)
Dept: REHABILITATION | Facility: HOSPITAL | Age: 63
End: 2018-12-13
Attending: PAIN MEDICINE
Payer: COMMERCIAL

## 2018-12-13 DIAGNOSIS — R53.1 DECREASED STRENGTH: ICD-10-CM

## 2018-12-13 DIAGNOSIS — Z78.9 IMPAIRED MOTOR CONTROL: ICD-10-CM

## 2018-12-13 DIAGNOSIS — M25.551 PAIN IN RIGHT HIP: ICD-10-CM

## 2018-12-13 DIAGNOSIS — M25.661 DECREASED RANGE OF MOTION OF RIGHT LOWER EXTREMITY: ICD-10-CM

## 2018-12-13 PROCEDURE — 97110 THERAPEUTIC EXERCISES: CPT | Mod: PN

## 2018-12-13 PROCEDURE — 97140 MANUAL THERAPY 1/> REGIONS: CPT | Mod: PN

## 2018-12-13 NOTE — PROGRESS NOTES
Physical Therapy Daily Note   Name: Dariusz Urbina  Clinic Number: [id number]    Diagnosis:   Encounter Diagnoses   Name Primary?    Decreased range of motion of right lower extremity     Pain in right hip     Decreased strength     Impaired motor control      Physician: Sahil Kemp Jr*  Treatment Orders: PT Eval and Treat    Precautions: L4-L5 done 12 years ago w/ Dr. Rogers-  Visit #: 6 of 20  G-code reported: Initial Evaluation (Visit #1)   Certification period: (11/20/2018 - 2/20/2018) PN Due: (12/20/2018) .     Subjective   Dariusz reports:he is scheduled to have a cortisone injection in his right hip on 12/19/18.      Pain Scale: 0/10 at rest 2-3/10 with activity  Objective     Time In: 8:00   Time Out:: 9:03  Total Treatment time: 63 minutes (1:1 with PT for duration of treatment session)     Patient began exercise program on upright bike  for 8 minutes.    Patient performed the following therapeutic exercises for 35 minutes in order to strengthen and stretch right:  Active hamstring stretch 10 times 5 sec holds ea  LTR on SB 2 minutes   Bridges 3 x 12 reps   SLR 2 x 15 reps right 2#  Prone hip extension 2 x 15 reps ea 2#  Prone hip extension c knee bent 2 x 15 reps 2#  SL hip abduction 2 x 15 reps right 2#  SL hip adduction on bolster 2 x 15 reps 0#  SL clams 2 x 15 reps c green TB ea (increase next visit)   Supine hip flexor stretch 3 x 30 sec on right   Figure four stretch - caused pain   SB squats 3 x 10 reps   Standing hip abduction 2 x 15 reps ea  Shuttle 2 leg press 3 bands 2 x 15 reps   +Prone isometric hip ER with yoga block 20x 5 sec holds  +Double leg balance on foam 3 x 30 sec EC (next visit)    Not performed today:  Piriformis stretch 3 x 20 sec holds ea     Dariusz  received the following manual therapy techniques x 12 minutes   Right hip To include: Lateral and Long axis joint distraction to R hip     Written Home Exercises Provided:  Patient educated to continue with previously issued HEP in order to complement therapy sessions.   Exercises were reviewed and Dariusz was able to demonstrate them prior to the end of the session. Patient received a written copy of exercises to perform at home. Dariusz demonstrated good  understanding of the education provided.     Assessment   Foto Next Visit (6th V)  Dariusz participated well in treatment today.  He was able to add a new exercise without any increase in his symptoms.  Patient continues to experience discomfort in his R hip with crossing his leg which is consistent with increased lateral hip weakness.  PT will continue to address this weakness in an effort to increase muscular support of the hip and decrease his pain in order to allow improved activity tolerance.      Pt's spiritual, cultural and educational needs considered and pt agreeable to plan of care and goals.    Prognosis: Good    Medical necessity is demonstrated by the following IMPAIRMENTS/PROBLEM LIST:   1) Increase in pain level limiting function   2) Decreased strength   3) Impaired motor control   4) Decreased flexibility    5) Lack of HEP    Anticipated barriers to physical therapy: none    Pt's spiritual, cultural and educational needs considered and pt agreeable to plan of care and goals as stated below:     GOALS: Short Term Goals:  4 weeks  1. Patient will be able to report decreased in active right hip pain  <   / =  4/10  to increase tolerance for daily motorcycle activities.   2. Patient will be able to report a 30% improvement in ability to engage in motorcycle rides without the presence of pain.   3. Patient will demonstrate an increased MMT in hip extension to 4+/5  to increase tolerance for walking and riding.   4. Patient will be able to demonstrate an increase in 5 degrees of hamstring flexibility on L LE to improve mobility.   5. Patient will be able to tolerate HEP to improve ROM and independence with ADL's    Long Term  Goals: 8 weeks  1. Patient will be able to report decreased in active right hip pain  <   / =  2 /10  to increase tolerance for daily motorcycle activities.   2. Patient will be able to report a 60% improvement in ability to engage in motorcycle rides without the presence of pain.   3. Patient will demonstrate an increased MMT in hip extension to 5/5  to increase tolerance for walking and riding.   4. Patient will be able to demonstrate an increase in 10 degrees of hamstring flexibility on L LE to improve mobility.   5. Patient will report at CJ level (20-40% impaired) on LEFS  to demonstrate increase in LE function with every day tasks.   6. Patient to be Independent with HEP to improve ROM and independence with ADL's    Plan   Continue with established Plan of Care towards PT goals. Cont PT 1-3 times a week for 12 weeks during the certification period (11/20/2018 - 2/20/2018) PN Due: (12/20/2018) .            Barry Bryan, PT  12/13/2018

## 2019-01-01 ENCOUNTER — PATIENT MESSAGE (OUTPATIENT)
Dept: FAMILY MEDICINE | Facility: CLINIC | Age: 64
End: 2019-01-01

## 2019-03-31 ENCOUNTER — PATIENT MESSAGE (OUTPATIENT)
Dept: INFECTIOUS DISEASES | Facility: CLINIC | Age: 64
End: 2019-03-31

## 2019-04-11 RX ORDER — PRAVASTATIN SODIUM 80 MG/1
TABLET ORAL
Qty: 90 TABLET | Refills: 0 | Status: SHIPPED | OUTPATIENT
Start: 2019-04-11 | End: 2019-07-08 | Stop reason: SDUPTHER

## 2019-04-23 ENCOUNTER — PATIENT MESSAGE (OUTPATIENT)
Dept: FAMILY MEDICINE | Facility: CLINIC | Age: 64
End: 2019-04-23

## 2019-05-02 ENCOUNTER — OFFICE VISIT (OUTPATIENT)
Dept: FAMILY MEDICINE | Facility: CLINIC | Age: 64
End: 2019-05-02
Payer: COMMERCIAL

## 2019-05-02 ENCOUNTER — HOSPITAL ENCOUNTER (OUTPATIENT)
Dept: RADIOLOGY | Facility: HOSPITAL | Age: 64
Discharge: HOME | End: 2019-05-02
Attending: INTERNAL MEDICINE
Payer: COMMERCIAL

## 2019-05-02 VITALS
DIASTOLIC BLOOD PRESSURE: 80 MMHG | HEART RATE: 72 BPM | BODY MASS INDEX: 26.55 KG/M2 | WEIGHT: 185.44 LBS | TEMPERATURE: 98 F | SYSTOLIC BLOOD PRESSURE: 130 MMHG | OXYGEN SATURATION: 97 % | HEIGHT: 70 IN

## 2019-05-02 DIAGNOSIS — B49 FUNGAL INFECTION OF LUNG: ICD-10-CM

## 2019-05-02 DIAGNOSIS — Z01.818 PREOPERATIVE EXAMINATION: Primary | ICD-10-CM

## 2019-05-02 DIAGNOSIS — E78.5 HYPERLIPIDEMIA, UNSPECIFIED HYPERLIPIDEMIA TYPE: ICD-10-CM

## 2019-05-02 DIAGNOSIS — Z01.818 PREOPERATIVE EXAMINATION: ICD-10-CM

## 2019-05-02 DIAGNOSIS — M25.551 RIGHT HIP PAIN: ICD-10-CM

## 2019-05-02 DIAGNOSIS — R73.03 PREDIABETES: ICD-10-CM

## 2019-05-02 PROCEDURE — 71046 XR CHEST PA AND LATERAL: ICD-10-PCS | Mod: 26,,, | Performed by: RADIOLOGY

## 2019-05-02 PROCEDURE — 99999 PR PBB SHADOW E&M-EST. PATIENT-LVL III: CPT | Mod: PBBFAC,,, | Performed by: INTERNAL MEDICINE

## 2019-05-02 PROCEDURE — 93005 EKG 12-LEAD: ICD-10-PCS | Mod: S$GLB,,, | Performed by: INTERNAL MEDICINE

## 2019-05-02 PROCEDURE — 99245 OFF/OP CONSLTJ NEW/EST HI 55: CPT | Mod: S$GLB,,, | Performed by: INTERNAL MEDICINE

## 2019-05-02 PROCEDURE — 93010 ELECTROCARDIOGRAM REPORT: CPT | Mod: S$GLB,,, | Performed by: INTERNAL MEDICINE

## 2019-05-02 PROCEDURE — 99999 PR PBB SHADOW E&M-EST. PATIENT-LVL III: ICD-10-PCS | Mod: PBBFAC,,, | Performed by: INTERNAL MEDICINE

## 2019-05-02 PROCEDURE — 93010 EKG 12-LEAD: ICD-10-PCS | Mod: S$GLB,,, | Performed by: INTERNAL MEDICINE

## 2019-05-02 PROCEDURE — 93005 ELECTROCARDIOGRAM TRACING: CPT | Mod: S$GLB,,, | Performed by: INTERNAL MEDICINE

## 2019-05-02 PROCEDURE — 99245 PR OFFICE CONSULTATION,LEVEL V: ICD-10-PCS | Mod: S$GLB,,, | Performed by: INTERNAL MEDICINE

## 2019-05-02 PROCEDURE — 71046 X-RAY EXAM CHEST 2 VIEWS: CPT | Mod: TC,FY,PO

## 2019-05-02 PROCEDURE — 71046 X-RAY EXAM CHEST 2 VIEWS: CPT | Mod: 26,,, | Performed by: RADIOLOGY

## 2019-05-02 NOTE — PROGRESS NOTES
Chief complaint preop    Patient is a 63 -year-old white male Here for   preop assessment.  Seen in consultation from Dr. Pablo Thomas.  He has apparently right hip arthritis causing moderate pain and he is set to have a right hip replacement.  He has had no prior complications related to anesthesia, bleeding or DVT with his prior general anesthesia and surgeries.  He has no cardiopulmonary or infectious symptoms.  He has had negative stress testing in the past and no symptoms of angina.  His EKG today does show some T-wave abnormalities.  Review of his description of his EKG from his prior nuclear stress test also describes some T-wave abnormalities but I do not have an EKG for direct comparison.                ID 3/18  Assessment:       1. 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 Fungal infection of lung    3. Lung mass    4. Lung nodules      Plan:       Continue fluconazole for another month - total 9 months  Continue Doxycycline until September 2018  Repeat PFTs this summer.   Repeat CT in September.  Return in 6 months.      ROS:   CONST: weight stable. EYES: no vision change. ENT: no sore throat. CV: no chest pain w/ exertion. RESP: no shortness of breath. GI: no nausea, vomiting, diarrhea. No dysphagia. : no urinary issues. MUSCULOSKELETAL: no new myalgias or arthralgias. SKIN: no new changes. NEURO: no focal deficits. PSYCH: no new issues. ENDOCRINE: no polyuria. HEME: no lymph nodes. ALLERGY: no general pruritis.     PAST MEDICAL HISTORY:                                                        1.  Hyperlipidemia.                                                          2.  Back surgery for slipped disk, Dr. Sams.                            3.  History of ED, seen prior by Urology.                                    4.  History of suspicious stress test and atypical chest pain, normal cath 2003 by Dr. Lugo, likely done at [facility].                            5.  Vasectomy.                                                                6.  Normal colonoscopy,  And , 7-10 years                                               7.  Erosive gastropathy, outside EGD along with a normal emptying study.     8.  Slight anemia in 2009, but did donate blood prior.  9.  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                                                                                                    FAMILY HISTORY:  Father  of lung cancer and coronary disease at 73.      Sister  of lung cancer, mom  of breast cancer and she had diabetes.   A brother is apparently okay.                                                                                               Gen: no distress  EYES: conjunctiva clear, non-icteric, PERRL  ENT: nose clear, nasal mucosa normal, oropharynx clear and moist, teeth good  NECK:supple, thyroid non-palpable  RESP: effort is good, lungs clear  CV: heart RRR w/o murmur, gallops or rubs; no carotid bruits, no edema  MS: gait normal, no clubbing or cyanosis of the digits,   SKIN: No rashes, warm to touch    Dariusz was seen today for pre-op exam.    Diagnoses and all orders for this visit:    Preoperative examination, low cardiopulmonary risk and patient is cleared for surgery.  I gave him a prescription to bring and he will get copies of his chest x-ray and labs off the computer.  -     APTT; Future  -     Protime-INR; Future  -     CBC auto differential; Future  -     Comprehensive metabolic panel; Future  -     Hemoglobin A1c; Future  -     X-Ray Chest PA And Lateral; Future  -     EKG 12-lead; Future    Right hip pain    Prediabetes  -      "APTT; Future  -     Protime-INR; Future  -     CBC auto differential; Future  -     Comprehensive metabolic panel; Future  -     Hemoglobin A1c; Future  -     X-Ray Chest PA And Lateral; Future  -     EKG 12-lead; Future    Hyperlipidemia, unspecified hyperlipidemia type  -     EKG 12-lead; Future    Fungal infection of lung, asymptomatic and encouraged him to put in a message to Infectious Disease to make sure he is not overdue for assessment and so forth.         Clinical it will definitely be sensitive as he expresses significant high anxiety referable to the above issues."This note will not be shared with the patient."  "

## 2019-05-03 ENCOUNTER — PATIENT MESSAGE (OUTPATIENT)
Dept: FAMILY MEDICINE | Facility: CLINIC | Age: 64
End: 2019-05-03

## 2019-05-03 ENCOUNTER — HOSPITAL ENCOUNTER (OUTPATIENT)
Dept: CARDIOLOGY | Facility: HOSPITAL | Age: 64
Discharge: HOME | End: 2019-05-03
Attending: INTERNAL MEDICINE
Payer: COMMERCIAL

## 2019-05-03 VITALS — HEIGHT: 70 IN | BODY MASS INDEX: 26.48 KG/M2 | WEIGHT: 185 LBS

## 2019-05-03 DIAGNOSIS — R94.31 ABNORMAL EKG: ICD-10-CM

## 2019-05-03 DIAGNOSIS — R94.31 ABNORMAL EKG: Primary | ICD-10-CM

## 2019-05-03 LAB
AORTIC ROOT ANNULUS: 2.42 CM
AORTIC VALVE CUSP SEPERATION: 1.81 CM
ASCENDING AORTA: 2.84 CM
AV INDEX (PROSTH): 0.9
AV MEAN GRADIENT: 3.86 MMHG
AV PEAK GRADIENT: 6.15 MMHG
AV VALVE AREA: 2.92 CM2
AV VELOCITY RATIO: 0.99
BSA FOR ECHO PROCEDURE: 2.04 M2
CV ECHO LV RWT: 0.37 CM
CV STRESS BASE HR: 88 BPM
DIASTOLIC BLOOD PRESSURE: 53 MMHG
DOP CALC AO PEAK VEL: 1.24 M/S
DOP CALC AO VTI: 26.14 CM
DOP CALC LVOT AREA: 3.23 CM2
DOP CALC LVOT DIAMETER: 2.03 CM
DOP CALC LVOT PEAK VEL: 1.22 M/S
DOP CALC LVOT STROKE VOLUME: 76.28 CM3
DOP CALCLVOT PEAK VEL VTI: 23.58 CM
E WAVE DECELERATION TIME: 219.36 MSEC
E/A RATIO: 1.09
E/E' RATIO: 12.57
ECHO LV POSTERIOR WALL: 0.86 CM (ref 0.6–1.1)
FRACTIONAL SHORTENING: 53 % (ref 28–44)
INTERVENTRICULAR SEPTUM: 1.07 CM (ref 0.6–1.1)
IVRT: 0.11 MSEC
LA MAJOR: 4.7 CM
LA MINOR: 4.71 CM
LA WIDTH: 3.34 CM
LEFT ATRIUM SIZE: 4.1 CM
LEFT ATRIUM VOLUME INDEX: 27.1 ML/M2
LEFT ATRIUM VOLUME: 54.77 CM3
LEFT INTERNAL DIMENSION IN SYSTOLE: 2.15 CM (ref 2.1–4)
LEFT VENTRICLE DIASTOLIC VOLUME INDEX: 48 ML/M2
LEFT VENTRICLE DIASTOLIC VOLUME: 96.93 ML
LEFT VENTRICLE MASS INDEX: 74.7 G/M2
LEFT VENTRICLE SYSTOLIC VOLUME INDEX: 7.6 ML/M2
LEFT VENTRICLE SYSTOLIC VOLUME: 15.37 ML
LEFT VENTRICULAR INTERNAL DIMENSION IN DIASTOLE: 4.59 CM (ref 3.5–6)
LEFT VENTRICULAR MASS: 150.75 G
LV LATERAL E/E' RATIO: 11
LV SEPTAL E/E' RATIO: 14.67
MV PEAK A VEL: 0.81 M/S
MV PEAK E VEL: 0.88 M/S
OHS CV CPX 1 MINUTE RECOVERY HEART RATE: 122 BPM
OHS CV CPX 85 PERCENT MAX PREDICTED HEART RATE MALE: 133
OHS CV CPX ESTIMATED METS: 10
OHS CV CPX MAX PREDICTED HEART RATE: 156
OHS CV CPX PATIENT IS FEMALE: 0
OHS CV CPX PATIENT IS MALE: 1
OHS CV CPX PEAK DIASTOLIC BLOOD PRESSURE: 57 MMHG
OHS CV CPX PEAK HEAR RATE: 139 BPM
OHS CV CPX PEAK RATE PRESSURE PRODUCT: NORMAL
OHS CV CPX PEAK SYSTOLIC BLOOD PRESSURE: 193 MMHG
OHS CV CPX PERCENT MAX PREDICTED HEART RATE ACHIEVED: 89
OHS CV CPX PERCENT TARGET HEART RATE ACHIEVED: 105.3
OHS CV CPX RATE PRESSURE PRODUCT PRESENTING: 9856
OHS CV CPX TARGET HEART RATE: 132
PISA TR MAX VEL: 2.75 M/S
PULM VEIN S/D RATIO: 1.39
PV PEAK D VEL: 0.44 M/S
PV PEAK S VEL: 0.61 M/S
PV PEAK VELOCITY: 1.67 CM/S
RA MAJOR: 4.55 CM
RA PRESSURE: 3 MMHG
RA WIDTH: 3.04 CM
RIGHT VENTRICULAR END-DIASTOLIC DIMENSION: 3.6 CM
RV TISSUE DOPPLER FREE WALL SYSTOLIC VELOCITY 1 (APICAL 4 CHAMBER VIEW): 14.18 M/S
SINUS: 2.78 CM
STJ: 2.63 CM
STRESS ECHO POST EXERCISE DUR MIN: 8 MIN
STRESS ECHO POST EXERCISE DUR SEC: 17
SYSTOLIC BLOOD PRESSURE: 112 MMHG
TDI LATERAL: 0.08
TDI SEPTAL: 0.06
TDI: 0.07
TR MAX PG: 30.25 MMHG
TRICUSPID ANNULAR PLANE SYSTOLIC EXCURSION: 2.4 CM
TV REST PULMONARY ARTERY PRESSURE: 33 MMHG

## 2019-05-03 PROCEDURE — 93325 ECHOCARDIOGRAM STRESS TEST WITH COLOR FLOW DOPPLER (CUPID ONLY): ICD-10-PCS | Mod: 26,,, | Performed by: INTERNAL MEDICINE

## 2019-05-03 PROCEDURE — 93325 DOPPLER ECHO COLOR FLOW MAPG: CPT

## 2019-05-03 PROCEDURE — 93325 DOPPLER ECHO COLOR FLOW MAPG: CPT | Mod: 26,,, | Performed by: INTERNAL MEDICINE

## 2019-05-03 PROCEDURE — 93320 DOPPLER ECHO COMPLETE: CPT | Mod: 26,,, | Performed by: INTERNAL MEDICINE

## 2019-05-03 PROCEDURE — 93320 ECHOCARDIOGRAM STRESS TEST WITH COLOR FLOW DOPPLER (CUPID ONLY): ICD-10-PCS | Mod: 26,,, | Performed by: INTERNAL MEDICINE

## 2019-05-03 PROCEDURE — 93351 STRESS TTE COMPLETE: CPT | Mod: 26,,, | Performed by: INTERNAL MEDICINE

## 2019-05-03 PROCEDURE — 93351 ECHOCARDIOGRAM STRESS TEST WITH COLOR FLOW DOPPLER (CUPID ONLY): ICD-10-PCS | Mod: 26,,, | Performed by: INTERNAL MEDICINE

## 2019-05-03 NOTE — TELEPHONE ENCOUNTER
Ask referrals to work on stress test if they do that. If cards does it, call them to set up since trying to do before a surgery upcoming

## 2019-05-06 ENCOUNTER — TELEPHONE (OUTPATIENT)
Dept: FAMILY MEDICINE | Facility: CLINIC | Age: 64
End: 2019-05-06

## 2019-05-06 NOTE — TELEPHONE ENCOUNTER
[facility] surgical department called stated that they need a new copy of pt ekg, another ekg faxed to [phone]

## 2019-06-11 ENCOUNTER — OFFICE VISIT (OUTPATIENT)
Dept: FAMILY MEDICINE | Facility: CLINIC | Age: 64
End: 2019-06-11
Payer: MEDICARE

## 2019-06-11 VITALS
DIASTOLIC BLOOD PRESSURE: 80 MMHG | HEIGHT: 70 IN | OXYGEN SATURATION: 97 % | TEMPERATURE: 98 F | SYSTOLIC BLOOD PRESSURE: 140 MMHG | HEART RATE: 92 BPM | BODY MASS INDEX: 25.79 KG/M2 | WEIGHT: 180.13 LBS

## 2019-06-11 DIAGNOSIS — J06.9 VIRAL URI WITH COUGH: Primary | ICD-10-CM

## 2019-06-11 PROCEDURE — 1101F PT FALLS ASSESS-DOCD LE1/YR: CPT | Mod: CPTII,S$GLB,, | Performed by: PHYSICIAN ASSISTANT

## 2019-06-11 PROCEDURE — 99214 PR OFFICE/OUTPT VISIT, EST, LEVL IV, 30-39 MIN: ICD-10-PCS | Mod: S$GLB,,, | Performed by: PHYSICIAN ASSISTANT

## 2019-06-11 PROCEDURE — 3008F BODY MASS INDEX DOCD: CPT | Mod: CPTII,S$GLB,, | Performed by: PHYSICIAN ASSISTANT

## 2019-06-11 PROCEDURE — 99999 PR PBB SHADOW E&M-EST. PATIENT-LVL III: ICD-10-PCS | Mod: PBBFAC,,, | Performed by: PHYSICIAN ASSISTANT

## 2019-06-11 PROCEDURE — 3008F PR BODY MASS INDEX (BMI) DOCUMENTED: ICD-10-PCS | Mod: CPTII,S$GLB,, | Performed by: PHYSICIAN ASSISTANT

## 2019-06-11 PROCEDURE — 99214 OFFICE O/P EST MOD 30 MIN: CPT | Mod: S$GLB,,, | Performed by: PHYSICIAN ASSISTANT

## 2019-06-11 PROCEDURE — 99999 PR PBB SHADOW E&M-EST. PATIENT-LVL III: CPT | Mod: PBBFAC,,, | Performed by: PHYSICIAN ASSISTANT

## 2019-06-11 PROCEDURE — 1101F PR PT FALLS ASSESS DOC 0-1 FALLS W/OUT INJ PAST YR: ICD-10-PCS | Mod: CPTII,S$GLB,, | Performed by: PHYSICIAN ASSISTANT

## 2019-06-11 RX ORDER — FLUTICASONE PROPIONATE 50 MCG
2 SPRAY, SUSPENSION (ML) NASAL DAILY
Qty: 1 BOTTLE | Refills: 12 | Status: SHIPPED | OUTPATIENT
Start: 2019-06-11 | End: 2019-07-11

## 2019-06-11 RX ORDER — PROMETHAZINE HYDROCHLORIDE AND DEXTROMETHORPHAN HYDROBROMIDE 6.25; 15 MG/5ML; MG/5ML
5 SYRUP ORAL
Qty: 118 ML | Refills: 0 | Status: SHIPPED | OUTPATIENT
Start: 2019-06-11 | End: 2019-06-21

## 2019-06-11 RX ORDER — LEVOCETIRIZINE DIHYDROCHLORIDE 5 MG/1
5 TABLET, FILM COATED ORAL NIGHTLY
Qty: 30 TABLET | Refills: 11 | Status: SHIPPED | OUTPATIENT
Start: 2019-06-11 | End: 2021-11-03

## 2019-06-11 NOTE — PROGRESS NOTES
Patient Name: Dariusz Urbina    : 1955  MRN: [id number]    Subjective:  Dariusz is a 64 y.o. male who presents today for:    Chief Complaint   Patient presents with    Cough    Nasal Congestion       HPI    Past Medical History  Past Medical History:   Diagnosis Date    Decreased libido 2013    Dermatitis 10/12/2012    Hyperlipidemia 2013       Past Surgical History  Past Surgical History:   Procedure Laterality Date    BRONCHOSCOPY-OPERATIVE,FLEXIBLE N/A 2017    [facility]    COLONOSCOPY      LAVAGE-ALVEOLAR N/A 2017    [facility]    LUMBAR FUSION      titanium rods    THORACOSCOPY-VIDEO ASSISTED (VATS) W/WEDGE LUNG RESECTION Right 2017    [facility]    TONSILLECTOMY         Family History  No family history on file.    Social History  Social History     Socioeconomic History    Marital status:      Spouse name: Not on file    Number of children: Not on file    Years of education: Not on file    Highest education level: Not on file   Occupational History    Not on file   Social Needs    Financial resource strain: Not hard at all    Food insecurity:     Worry: Never true     Inability: Never true    Transportation needs:     Medical: No     Non-medical: No   Tobacco Use    Smoking status: Former Smoker     Packs/day: 1.00     Years: 10.00     Pack years: 10.00     Last attempt to quit: 1989     Years since quittin.9    Smokeless tobacco: Never Used   Substance and Sexual Activity    Alcohol use: No     Frequency: 2-4 times a month     Drinks per session: 1 or 2     Binge frequency: Never    Drug use: No    Sexual activity: Not on file   Lifestyle    Physical activity:     Days per week: 1 day     Minutes per session: 0 min    Stress: Not at all   Relationships    Social connections:     Talks on phone: More than three  "times a week     Gets together: Three times a week     Attends Synagogue service: Not on file     Active member of club or organization: Yes     Attends meetings of clubs or organizations: More than 4 times per year     Relationship status:    Other Topics Concern    Not on file   Social History Narrative    Not on file       Current Medications  Current Outpatient Medications on File Prior to Visit   Medication Sig Dispense Refill    aspirin 81 MG Chew Take 81 mg by mouth once daily.      pravastatin (PRAVACHOL) 80 MG tablet TAKE 1 TABLET BY MOUTH EVERY DAY 90 tablet 0     No current facility-administered medications on file prior to visit.        Allergies   Review of patient's allergies indicates:  No Known Allergies      ROS  Review of Systems      Objective:    BP (!) 140/80   Pulse 92   Temp 97.9 °F (36.6 °C) (Oral)   Ht 5' 10" (1.778 m)   Wt 81.7 kg (180 lb 1.9 oz)   SpO2 97%   BMI 25.84 kg/m²     Physical Exam    Assessment/Plan:  Dariusz Urbina is a 64 y.o. male who presents today for :    Dariusz was seen today for cough and nasal congestion.    Diagnoses and all orders for this visit:    Viral URI with cough  -     promethazine-dextromethorphan (PROMETHAZINE-DM) 6.25-15 mg/5 mL Syrp; Take 5 mLs by mouth every 4 to 6 hours as needed.  -     fluticasone propionate (FLONASE) 50 mcg/actuation nasal spray; 2 sprays (100 mcg total) by Each Nare route once daily.  -     levocetirizine (XYZAL) 5 MG tablet; Take 1 tablet (5 mg total) by mouth every evening.          Problem list issues addressed during this visit    No problem-specific Assessment & Plan notes found for this encounter.           Health maintenance reviewed and disussed, deferred at this time due to acute illness      I spent >50% of this 25 minute encounter counseling the patient on diagnoses, risk factors, and treatment.         Encouraged to call/return to clinic if symptoms persist or worsen    Sherron Hilario PA-C  [facility]/ " Internal Med

## 2019-06-11 NOTE — PATIENT INSTRUCTIONS
Viral Upper Respiratory Illness (Adult)  You have a viral upper respiratory illness (URI), which is another term for the common cold. This illness is contagious during the first few days. It is spread through the air by coughing and sneezing. It may also be spread by direct contact (touching the sick person and then touching your own eyes, nose, or mouth). Frequent handwashing will decrease risk of spread. Most viral illnesses go away within 7 to 10 days with rest and simple home remedies. Sometimes the illness may last for several weeks. Antibiotics will not kill a virus, and they are generally not prescribed for this condition.    Home care  · If symptoms are severe, rest at home for the first 2 to 3 days. When you resume activity, don't let yourself get too tired.  · Avoid being exposed to cigarette smoke (yours or others).  · You may use acetaminophen or ibuprofen to control pain and fever, unless another medicine was prescribed. (Note: If you have chronic liver or kidney disease, have ever had a stomach ulcer or gastrointestinal bleeding, or are taking blood-thinning medicines, talk with your healthcare provider before using these medicines.) Aspirin should never be given to anyone under 18 years of age who is ill with a viral infection or fever. It may cause severe liver or brain damage.  · Your appetite may be poor, so a light diet is fine. Avoid dehydration by drinking 6 to 8 glasses of fluids per day (water, soft drinks, juices, tea, or soup). Extra fluids will help loosen secretions in the nose and lungs.  · Over-the-counter cold medicines will not shorten the length of time youre sick, but they may be helpful for the following symptoms: cough, sore throat, and nasal and sinus congestion. (Note: Do not use decongestants if you have high blood pressure.)  Follow-up care  Follow up with your healthcare provider, or as advised.  When to seek medical advice  Call your healthcare provider right away if any  of these occur:  · Cough with lots of colored sputum (mucus)  · Severe headache; face, neck, or ear pain  · Difficulty swallowing due to throat pain  · Fever of 100.4°F (38°C)  Call 911, or get immediate medical care  Call emergency services right away if any of these occur:  · Chest pain, shortness of breath, wheezing, or difficulty breathing  · Coughing up blood  · Inability to swallow due to throat pain  Date Last Reviewed: 9/13/2015  © 2059-0950 [employer]. [address] [place] [id number]. All rights reserved. This information is not intended as a substitute for professional medical care. Always follow your healthcare professional's instructions.

## 2019-06-11 NOTE — PROGRESS NOTES
Patient Name: Dariusz Urbina    : 1955  MRN: [id number]    Subjective:  Dariusz is a 64 y.o. male who presents today for:    Chief Complaint   Patient presents with    Cough    Nasal Congestion       HPI  Patient has multiple medical diagnoses as listed below in the history. He complains of cough and nasal congestion for 4 days. The symptoms have gradually worsened. The symptoms are worse in the evening and early morning. His cough is dry. He has associated post nasal drip and rhinorrhea. He has been taking Sudafed at night with some relief. He has recently been around his granddaughter who has had cold. He denies fever, chills, body aches, wheezing, dyspnea or chest pain.    Past Medical History  Past Medical History:   Diagnosis Date    Decreased libido 2013    Dermatitis 10/12/2012    Hyperlipidemia 2013       Past Surgical History  Past Surgical History:   Procedure Laterality Date    BRONCHOSCOPY-OPERATIVE,FLEXIBLE N/A 2017    [facility]    COLONOSCOPY      LAVAGE-ALVEOLAR N/A 2017    [facility]    LUMBAR FUSION      titanium rods    THORACOSCOPY-VIDEO ASSISTED (VATS) W/WEDGE LUNG RESECTION Right 2017    [facility]    TONSILLECTOMY         Family History  No family history on file.    Social History  Social History     Socioeconomic History    Marital status:      Spouse name: Not on file    Number of children: Not on file    Years of education: Not on file    Highest education level: Not on file   Occupational History    Not on file   Social Needs    Financial resource strain: Not hard at all    Food insecurity:     Worry: Never true     Inability: Never true    Transportation needs:     Medical: No     Non-medical: No   Tobacco Use    Smoking status: Former Smoker     Packs/day: 1.00     Years: 10.00     Pack years: 10.00      "Last attempt to quit: 1989     Years since quittin.9    Smokeless tobacco: Never Used   Substance and Sexual Activity    Alcohol use: No     Frequency: 2-4 times a month     Drinks per session: 1 or 2     Binge frequency: Never    Drug use: No    Sexual activity: Not on file   Lifestyle    Physical activity:     Days per week: 1 day     Minutes per session: 0 min    Stress: Not at all   Relationships    Social connections:     Talks on phone: More than three times a week     Gets together: Three times a week     Attends Taoist service: Not on file     Active member of club or organization: Yes     Attends meetings of clubs or organizations: More than 4 times per year     Relationship status:    Other Topics Concern    Not on file   Social History Narrative    Not on file       Current Medications  Current Outpatient Medications on File Prior to Visit   Medication Sig Dispense Refill    aspirin 81 MG Chew Take 81 mg by mouth once daily.      pravastatin (PRAVACHOL) 80 MG tablet TAKE 1 TABLET BY MOUTH EVERY DAY 90 tablet 0     No current facility-administered medications on file prior to visit.        Allergies   Review of patient's allergies indicates:  No Known Allergies      ROS  Review of Systems   Constitutional: Negative for chills, fatigue and fever.   HENT: Positive for congestion, postnasal drip and rhinorrhea. Negative for ear pain, sinus pressure, sinus pain, sneezing and sore throat.    Respiratory: Positive for cough. Negative for shortness of breath and wheezing.    Cardiovascular: Negative for chest pain and palpitations.   Gastrointestinal: Negative for abdominal pain and nausea.   Musculoskeletal: Negative for myalgias.   Skin: Negative for rash.   Neurological: Negative for light-headedness and headaches.   Hematological: Negative for adenopathy.         Objective:    BP (!) 140/80   Pulse 92   Temp 97.9 °F (36.6 °C) (Oral)   Ht 5' 10" (1.778 m)   Wt 81.7 kg (180 lb " 1.9 oz)   SpO2 97%   BMI 25.84 kg/m²     Physical Exam   Constitutional: Vital signs are normal.   HENT:   Head: Normocephalic.   Right Ear: Hearing, external ear and ear canal normal. Tympanic membrane is not bulging. A middle ear effusion is present.   Left Ear: Hearing, tympanic membrane, external ear and ear canal normal. Tympanic membrane is not bulging.  No middle ear effusion.   Nose: Mucosal edema and rhinorrhea present. Right sinus exhibits no maxillary sinus tenderness and no frontal sinus tenderness. Left sinus exhibits no maxillary sinus tenderness and no frontal sinus tenderness.   Mouth/Throat: Uvula is midline and mucous membranes are normal. Posterior oropharyngeal erythema present. No oropharyngeal exudate or posterior oropharyngeal edema.   Eyes: Pupils are equal, round, and reactive to light. Conjunctivae, EOM and lids are normal.   Cardiovascular: Normal rate, regular rhythm, S1 normal, S2 normal and normal heart sounds.   Pulmonary/Chest: Effort normal and breath sounds normal. He has no wheezes.   Lymphadenopathy:     He has no cervical adenopathy.        Right: No supraclavicular adenopathy present.        Left: No supraclavicular adenopathy present.   Neurological: He is alert.   Skin: Skin is warm, dry and intact. No rash noted.   Psychiatric: He has a normal mood and affect. Judgment normal.       Assessment/Plan:  Dariusz Urbina is a 64 y.o. male who presents today for :    Dariusz was seen today for cough and nasal congestion.    Diagnoses and all orders for this visit:    Viral URI with cough  -     promethazine-dextromethorphan (PROMETHAZINE-DM) 6.25-15 mg/5 mL Syrp; Take 5 mLs by mouth every 4 to 6 hours as needed.  -     fluticasone propionate (FLONASE) 50 mcg/actuation nasal spray; 2 sprays (100 mcg total) by Each Nare route once daily.  -     levocetirizine (XYZAL) 5 MG tablet; Take 1 tablet (5 mg total) by mouth every evening.  Counseled regarding treatment of likely upper  respiratory viral syndrome with increased fluid intake/hydration, OTC decongestants, mucolytic therapy and analgesics (e.g., ibuprofen, acetaminophen). Encouraged to call/return to clinic if symptoms persist/worsening beyond 48-72 hours approximately.        Health maintenance reviewed and disussed, deferred at this time due to acute illness      I spent >50% of this 25 minute encounter counseling the patient on diagnoses, risk factors, and treatment.         Encouraged to call/return to clinic if symptoms persist or worsen    Sherron Hilario PA-C  [facility]/ Internal Med

## 2019-06-19 ENCOUNTER — HOSPITAL ENCOUNTER (OUTPATIENT)
Dept: RADIOLOGY | Facility: HOSPITAL | Age: 64
Discharge: HOME | End: 2019-06-19
Attending: ORTHOPAEDIC SURGERY
Payer: MEDICARE

## 2019-06-19 DIAGNOSIS — Z47.1 AFTERCARE FOLLOWING JOINT REPLACEMENT: ICD-10-CM

## 2019-06-19 DIAGNOSIS — M25.551 RIGHT HIP PAIN: ICD-10-CM

## 2019-06-19 DIAGNOSIS — M25.551 RIGHT HIP PAIN: Primary | ICD-10-CM

## 2019-06-19 DIAGNOSIS — Z96.649 HIP JOINT REPLACEMENT STATUS: ICD-10-CM

## 2019-06-19 PROCEDURE — 73502 XR HIP 2 VIEW RIGHT: ICD-10-PCS | Mod: 26,RT,, | Performed by: RADIOLOGY

## 2019-06-19 PROCEDURE — 73502 X-RAY EXAM HIP UNI 2-3 VIEWS: CPT | Mod: TC,FY,PO,RT

## 2019-06-19 PROCEDURE — 73502 X-RAY EXAM HIP UNI 2-3 VIEWS: CPT | Mod: 26,RT,, | Performed by: RADIOLOGY

## 2019-07-08 RX ORDER — PRAVASTATIN SODIUM 80 MG/1
80 TABLET ORAL DAILY
Qty: 90 TABLET | Refills: 12 | Status: SHIPPED | OUTPATIENT
Start: 2019-07-08 | End: 2020-12-07 | Stop reason: SDUPTHER

## 2019-07-08 NOTE — TELEPHONE ENCOUNTER
----- Message from Asia Levi sent at 7/8/2019 11:08 AM CDT -----  Contact: Self  Type: RX Refill Request    Who Called: Doroteo    Refill or New Rx:New    RX Name and Strength:pravastatin (PRAVACHOL) 80 MG tablet 90 tablet     How is the patient currently taking it? (ex. 1XDay):1xday    Is this a 30 day or 90 day RX:90    Preferred Pharmacy with phone number:[facility] - [address] [phone] (Phone)  [phone] (Fax)    Local or Mail Order:Mail    Ordering Provider:Ehrensing    Would the patient rather a call back or a response via [facility]? Call    Best Call Back Number:[phone]    Additional Information: Harvinder is requesting new Rx for listed medication

## 2019-08-15 ENCOUNTER — HOSPITAL ENCOUNTER (OUTPATIENT)
Dept: RADIOLOGY | Facility: HOSPITAL | Age: 64
Discharge: HOME | End: 2019-08-15
Attending: NURSE PRACTITIONER
Payer: MEDICARE

## 2019-08-15 ENCOUNTER — TELEPHONE (OUTPATIENT)
Dept: FAMILY MEDICINE | Facility: CLINIC | Age: 64
End: 2019-08-15

## 2019-08-15 ENCOUNTER — OFFICE VISIT (OUTPATIENT)
Dept: FAMILY MEDICINE | Facility: CLINIC | Age: 64
End: 2019-08-15
Payer: MEDICARE

## 2019-08-15 VITALS
OXYGEN SATURATION: 95 % | HEIGHT: 70 IN | DIASTOLIC BLOOD PRESSURE: 87 MMHG | SYSTOLIC BLOOD PRESSURE: 129 MMHG | BODY MASS INDEX: 25.79 KG/M2 | HEART RATE: 78 BPM | TEMPERATURE: 98 F | WEIGHT: 180.13 LBS

## 2019-08-15 DIAGNOSIS — E78.5 HYPERLIPIDEMIA, UNSPECIFIED HYPERLIPIDEMIA TYPE: ICD-10-CM

## 2019-08-15 DIAGNOSIS — M25.561 ACUTE PAIN OF RIGHT KNEE: ICD-10-CM
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
## 2019-08-15 DIAGNOSIS — Z96.641 STATUS POST HIP REPLACEMENT, RIGHT: ICD-10-CM

## 2019-08-15 PROCEDURE — 99214 OFFICE O/P EST MOD 30 MIN: CPT | Mod: HCNC,S$GLB,, | Performed by: NURSE PRACTITIONER

## 2019-08-15 PROCEDURE — 3008F PR BODY MASS INDEX (BMI) DOCUMENTED: ICD-10-PCS | Mod: HCNC,CPTII,S$GLB, | Performed by: NURSE PRACTITIONER

## 2019-08-15 PROCEDURE — 99999 PR PBB SHADOW E&M-EST. PATIENT-LVL IV: CPT | Mod: PBBFAC,HCNC,, | Performed by: NURSE PRACTITIONER

## 2019-08-15 PROCEDURE — 1101F PR PT FALLS ASSESS DOC 0-1 FALLS W/OUT INJ PAST YR: ICD-10-PCS | Mod: HCNC,CPTII,S$GLB, | Performed by: NURSE PRACTITIONER

## 2019-08-15 PROCEDURE — 73562 X-RAY EXAM OF KNEE 3: CPT | Mod: 26,HCNC,RT, | Performed by: RADIOLOGY

## 2019-08-15 PROCEDURE — 73562 X-RAY EXAM OF KNEE 3: CPT | Mod: TC,HCNC,FY,PO,RT

## 2019-08-15 PROCEDURE — 99214 PR OFFICE/OUTPT VISIT, EST, LEVL IV, 30-39 MIN: ICD-10-PCS | Mod: HCNC,S$GLB,, | Performed by: NURSE PRACTITIONER

## 2019-08-15 PROCEDURE — 99999 PR PBB SHADOW E&M-EST. PATIENT-LVL IV: ICD-10-PCS | Mod: PBBFAC,HCNC,, | Performed by: NURSE PRACTITIONER

## 2019-08-15 PROCEDURE — 3008F BODY MASS INDEX DOCD: CPT | Mod: HCNC,CPTII,S$GLB, | Performed by: NURSE PRACTITIONER

## 2019-08-15 PROCEDURE — 1101F PT FALLS ASSESS-DOCD LE1/YR: CPT | Mod: HCNC,CPTII,S$GLB, | Performed by: NURSE PRACTITIONER

## 2019-08-15 PROCEDURE — 73562 XR KNEE 3 VIEW RIGHT: ICD-10-PCS | Mod: 26,HCNC,RT, | Performed by: RADIOLOGY

## 2019-08-15 RX ORDER — IBUPROFEN 100 MG/5ML
1000 SUSPENSION, ORAL (FINAL DOSE FORM) ORAL
COMMUNITY

## 2019-08-15 RX ORDER — LANOLIN ALCOHOL/MO/W.PET/CERES
100 CREAM (GRAM) TOPICAL
COMMUNITY

## 2019-08-15 RX ORDER — CHOLECALCIFEROL (VITAMIN D3) 25 MCG
1000 TABLET ORAL
COMMUNITY

## 2019-08-15 RX ORDER — OXYCODONE AND ACETAMINOPHEN 5; 325 MG/1; MG/1
TABLET ORAL
Refills: 0 | COMMUNITY
Start: 2019-05-09 | End: 2021-11-03
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
## 2019-08-15 NOTE — TELEPHONE ENCOUNTER
Please let the patient know that his x-rya shows no fractures or dislocations. Let me know if his pain does not improve in one week.    Thanks!  MALIKA Farias

## 2019-08-15 NOTE — PATIENT INSTRUCTIONS
Arthralgia    Arthralgia is the term for pain in or around the joint. It is a symptom, not a disease. This pain may involve one or more joints. In some cases, the pain moves from joint to joint.  There are many causes for joint pain. These include:  · Injury  · Osteoarthritis (wearing out of the joint surface)  · Gout (inflammation of the joint due to crystals in the joint fluid)  · Infection inside the joint    · Bursitis (inflammation of the fluid-filled sacs around the joint)  · Autoimmune disorders such as rheumatoid arthritis or lupus  · Tendonitis (inflammation of chords that attach muscle to bone)  Home care  · Rest the involved joint(s) until your symptoms improve.   · You may be prescribed pain medicine. If none is prescribed, you may use acetaminophen or ibuprofen to control pain and inflammation.  Follow-up care  Follow up with your healthcare provider or as advised.  When to seek medical advice  Contact your healthcare provider right away if any of the following occurs:  · Pain, swelling, or redness of joint increases  · Pain worsens or recurs after a period of improvement  · Pain moves to other joints  · You cannot bear weight on the affected joint   · You cannot move the affected joint  · Joint appears deformed  · New rash appears  · Fever of 100.4ºF (38ºC) or higher, or as directed by your healthcare provider  Date Last Reviewed: 3/1/2017  © 6715-0216 [employer]. [address] [place] [address], [place] [address]. All rights reserved. This information is not intended as a substitute for professional medical care. Always follow your healthcare professional's instructions.

## 2019-09-03 ENCOUNTER — PATIENT MESSAGE (OUTPATIENT)
Dept: PULMONOLOGY | Facility: CLINIC | Age: 64
End: 2019-09-03

## 2019-12-05 ENCOUNTER — OFFICE VISIT (OUTPATIENT)
Dept: FAMILY MEDICINE | Facility: CLINIC | Age: 64
End: 2019-12-05
Payer: MEDICARE

## 2019-12-05 VITALS
DIASTOLIC BLOOD PRESSURE: 70 MMHG | BODY MASS INDEX: 24.68 KG/M2 | WEIGHT: 172.38 LBS | OXYGEN SATURATION: 98 % | HEIGHT: 70 IN | TEMPERATURE: 98 F | HEART RATE: 96 BPM | SYSTOLIC BLOOD PRESSURE: 124 MMHG

## 2019-12-05 DIAGNOSIS — B96.89 ACUTE BACTERIAL SINUSITIS: Primary | ICD-10-CM

## 2019-12-05 DIAGNOSIS — J01.90 ACUTE BACTERIAL SINUSITIS: Primary | ICD-10-CM

## 2019-12-05 PROCEDURE — 3008F PR BODY MASS INDEX (BMI) DOCUMENTED: ICD-10-PCS | Mod: HCNC,CPTII,S$GLB, | Performed by: PHYSICIAN ASSISTANT

## 2019-12-05 PROCEDURE — 3008F BODY MASS INDEX DOCD: CPT | Mod: HCNC,CPTII,S$GLB, | Performed by: PHYSICIAN ASSISTANT

## 2019-12-05 PROCEDURE — 99214 PR OFFICE/OUTPT VISIT, EST, LEVL IV, 30-39 MIN: ICD-10-PCS | Mod: HCNC,S$GLB,, | Performed by: PHYSICIAN ASSISTANT

## 2019-12-05 PROCEDURE — 99214 OFFICE O/P EST MOD 30 MIN: CPT | Mod: HCNC,S$GLB,, | Performed by: PHYSICIAN ASSISTANT

## 2019-12-05 PROCEDURE — 1101F PT FALLS ASSESS-DOCD LE1/YR: CPT | Mod: HCNC,CPTII,S$GLB, | Performed by: PHYSICIAN ASSISTANT

## 2019-12-05 PROCEDURE — 99999 PR PBB SHADOW E&M-EST. PATIENT-LVL IV: CPT | Mod: PBBFAC,HCNC,, | Performed by: PHYSICIAN ASSISTANT

## 2019-12-05 PROCEDURE — 99999 PR PBB SHADOW E&M-EST. PATIENT-LVL IV: ICD-10-PCS | Mod: PBBFAC,HCNC,, | Performed by: PHYSICIAN ASSISTANT

## 2019-12-05 PROCEDURE — 1101F PR PT FALLS ASSESS DOC 0-1 FALLS W/OUT INJ PAST YR: ICD-10-PCS | Mod: HCNC,CPTII,S$GLB, | Performed by: PHYSICIAN ASSISTANT

## 2019-12-05 RX ORDER — PROMETHAZINE HYDROCHLORIDE AND DEXTROMETHORPHAN HYDROBROMIDE 6.25; 15 MG/5ML; MG/5ML
5 SYRUP ORAL
Qty: 118 ML | Refills: 0 | Status: CANCELLED | OUTPATIENT
Start: 2019-12-05 | End: 2019-12-15

## 2019-12-05 RX ORDER — PROMETHAZINE HYDROCHLORIDE AND DEXTROMETHORPHAN HYDROBROMIDE 6.25; 15 MG/5ML; MG/5ML
5 SYRUP ORAL
Qty: 118 ML | Refills: 0 | Status: SHIPPED | OUTPATIENT
Start: 2019-12-05 | End: 2019-12-15

## 2019-12-05 RX ORDER — AMOXICILLIN AND CLAVULANATE POTASSIUM 875; 125 MG/1; MG/1
1 TABLET, FILM COATED ORAL 2 TIMES DAILY
Qty: 20 TABLET | Refills: 0 | Status: SHIPPED | OUTPATIENT
Start: 2019-12-05 | End: 2019-12-15

## 2019-12-05 NOTE — PATIENT INSTRUCTIONS
Viral Upper Respiratory Illness (Adult)  You have a viral upper respiratory illness (URI), which is another term for the common cold. This illness is contagious during the first few days. It is spread through the air by coughing and sneezing. It may also be spread by direct contact (touching the sick person and then touching your own eyes, nose, or mouth). Frequent handwashing will decrease risk of spread. Most viral illnesses go away within 7 to 10 days with rest and simple home remedies. Sometimes the illness may last for several weeks. Antibiotics will not kill a virus, and they are generally not prescribed for this condition.    Home care  · If symptoms are severe, rest at home for the first 2 to 3 days. When you resume activity, don't let yourself get too tired.  · Avoid being exposed to cigarette smoke (yours or others).  · You may use acetaminophen or ibuprofen to control pain and fever, unless another medicine was prescribed. (Note: If you have chronic liver or kidney disease, have ever had a stomach ulcer or gastrointestinal bleeding, or are taking blood-thinning medicines, talk with your healthcare provider before using these medicines.) Aspirin should never be given to anyone under 18 years of age who is ill with a viral infection or fever. It may cause severe liver or brain damage.  · Your appetite may be poor, so a light diet is fine. Avoid dehydration by drinking 6 to 8 glasses of fluids per day (water, soft drinks, juices, tea, or soup). Extra fluids will help loosen secretions in the nose and lungs.  · Over-the-counter cold medicines will not shorten the length of time youre sick, but they may be helpful for the following symptoms: cough, sore throat, and nasal and sinus congestion. (Note: Do not use decongestants if you have high blood pressure.)  Follow-up care  Follow up with your healthcare provider, or as advised.  When to seek medical advice  Call your healthcare provider right away if any  of these occur:  · Cough with lots of colored sputum (mucus)  · Severe headache; face, neck, or ear pain  · Difficulty swallowing due to throat pain  · Fever of 100.4°F (38°C)  Call 911, or get immediate medical care  Call emergency services right away if any of these occur:  · Chest pain, shortness of breath, wheezing, or difficulty breathing  · Coughing up blood  · Inability to swallow due to throat pain  Date Last Reviewed: 9/13/2015  © 9691-5923 [employer]. [address] [place] [address]. All rights reserved. This information is not intended as a substitute for professional medical care. Always follow your healthcare professional's instructions.

## 2019-12-05 NOTE — PROGRESS NOTES
Patient Name: Dariusz Urbina    : 1955  MRN: [id number]    Subjective:  Dariusz is a 64 y.o. male who presents today for:    Chief Complaint   Patient presents with    Nasal Congestion       HPI  Patient has multiple medical diagnoses as listed below in the history. He complains of nasal congestion and cough for 2 weeks. He has associated headache, sinus pressure and post nasal drip. The symptoms are worse in the evening and early morning. He has tried OTC sinus medications without relief. He denies sick contacts. She denies fever, chills, body aches, wheezing, dyspnea or chest pain.    Past Medical History  Past Medical History:   Diagnosis Date    Decreased libido 2013    Dermatitis 10/12/2012    Hyperlipidemia 2013       Past Surgical History  Past Surgical History:   Procedure Laterality Date    COLONOSCOPY      HIP REPLACEMENT ARTHROPLASTY  2019    LUMBAR FUSION      titanium rods    TONSILLECTOMY         Family History  History reviewed. No pertinent family history.    Social History  Social History     Socioeconomic History    Marital status:      Spouse name: Not on file    Number of children: Not on file    Years of education: Not on file    Highest education level: Not on file   Occupational History    Not on file   Social Needs    Financial resource strain: Not hard at all    Food insecurity:     Worry: Never true     Inability: Never true    Transportation needs:     Medical: No     Non-medical: No   Tobacco Use    Smoking status: Former Smoker     Packs/day: 1.00     Years: 10.00     Pack years: 10.00     Last attempt to quit: 1989     Years since quittin.4    Smokeless tobacco: Never Used   Substance and Sexual Activity    Alcohol use: No     Frequency: 2-4 times a month     Drinks per session: 1 or 2     Binge frequency: Never    Drug use: No    Sexual activity: Not on file   Lifestyle    Physical activity:     Days per week: 1 day     Minutes per  session: 0 min    Stress: Not at all   Relationships    Social connections:     Talks on phone: More than three times a week     Gets together: Three times a week     Attends Oriental orthodox service: Not on file     Active member of club or organization: Yes     Attends meetings of clubs or organizations: More than 4 times per year     Relationship status:    Other Topics Concern    Not on file   Social History Narrative    Not on file       Current Medications  Current Outpatient Medications on File Prior to Visit   Medication Sig Dispense Refill    ascorbic acid, vitamin C, (VITAMIN C) 1000 MG tablet Take 1,000 mg by mouth.      aspirin 81 MG Chew Take 81 mg by mouth once daily.      cyanocobalamin (VITAMIN B-12) 1000 MCG tablet Take 100 mcg by mouth.      pravastatin (PRAVACHOL) 80 MG tablet Take 1 tablet (80 mg total) by mouth once daily. 90 tablet 12    vitamin D (VITAMIN D3) 1000 units Tab Take 1,000 Units by mouth.      levocetirizine (XYZAL) 5 MG tablet Take 1 tablet (5 mg total) by mouth every evening. (Patient not taking: Reported on 12/5/2019) 30 tablet 11    oxyCODONE-acetaminophen (PERCOCET) 5-325 mg per tablet   0     No current facility-administered medications on file prior to visit.        Allergies   Review of patient's allergies indicates:  No Known Allergies      ROS  Review of Systems   Constitutional: Positive for fatigue. Negative for chills and fever.   HENT: Positive for postnasal drip and sinus pressure. Negative for congestion, ear pain, rhinorrhea, sinus pain, sneezing and sore throat.    Respiratory: Positive for cough. Negative for shortness of breath and wheezing.    Cardiovascular: Negative for chest pain and palpitations.   Gastrointestinal: Negative for abdominal pain and nausea.   Musculoskeletal: Negative for myalgias.   Skin: Negative for rash.   Neurological: Positive for headaches. Negative for light-headedness.   Hematological: Negative for adenopathy.  "        Objective:    /70 (BP Location: Right arm, Patient Position: Sitting, BP Method: Medium (Manual))   Pulse 96   Temp 98.1 °F (36.7 °C) (Oral)   Ht 5' 10" (1.778 m)   Wt 78.2 kg (172 lb 6.4 oz)   SpO2 98%   BMI 24.74 kg/m²     Physical Exam   Constitutional: Vital signs are normal.   HENT:   Head: Normocephalic.   Right Ear: Hearing, external ear and ear canal normal. Tympanic membrane is not erythematous and not bulging. A middle ear effusion is present.   Left Ear: Hearing, tympanic membrane, external ear and ear canal normal. Tympanic membrane is not bulging.  No middle ear effusion.   Nose: Mucosal edema present. Right sinus exhibits maxillary sinus tenderness. Right sinus exhibits no frontal sinus tenderness. Left sinus exhibits no maxillary sinus tenderness and no frontal sinus tenderness.   Mouth/Throat: Uvula is midline and mucous membranes are normal. Posterior oropharyngeal erythema present. No oropharyngeal exudate or posterior oropharyngeal edema.   Cobblestoning posterior oropharynx     Eyes: Pupils are equal, round, and reactive to light. Conjunctivae, EOM and lids are normal.   Cardiovascular: Normal rate, regular rhythm, S1 normal, S2 normal and normal heart sounds.   Pulmonary/Chest: Effort normal and breath sounds normal. He has no wheezes.   Lymphadenopathy:     He has no cervical adenopathy.        Right: No supraclavicular adenopathy present.        Left: No supraclavicular adenopathy present.   Neurological: He is alert.   Skin: Skin is warm, dry and intact. No rash noted.   Psychiatric: He has a normal mood and affect. Judgment normal.       Assessment/Plan:  Dariusz Urbina is a 64 y.o. male who presents today for :    Dariusz was seen today for nasal congestion.    Diagnoses and all orders for this visit:    Acute bacterial sinusitis  -     amoxicillin-clavulanate 875-125mg (AUGMENTIN) 875-125 mg per tablet; Take 1 tablet by mouth 2 (two) times daily. for 10 days  -     " promethazine-dextromethorphan (PROMETHAZINE-DM) 6.25-15 mg/5 mL Syrp; Take 5 mLs by mouth every 4 to 6 hours as needed.    Discussed symptomatology of acute bacterial rhinosinusitis, including risk factors and proposed benefit, side effects/risks of antibiotic therapy   Counseled patient on the clinical course of condition including symptomatology, treatment and prevention.  Counseled regarding risks, benefits, and limitations of medications.  Advised patient to seek urgent/emergent care if symptoms intensify.  Sent patient with informational material about diagnosis.  Patient gave verbal understanding and agreement of plan.        Health maintenance reviewed and discussed, deferred at this time due to acute illness      I spent >50% of this 25 minute encounter counseling the patient on diagnoses, risk factors, and treatments.         Encouraged to call/return to clinic if symptoms persist or worsen    Sherron Hilario PA-C  [facility] Family Med/ Internal Med

## 2020-01-17 ENCOUNTER — OFFICE VISIT (OUTPATIENT)
Dept: URGENT CARE | Facility: CLINIC | Age: 65
End: 2020-01-17
Payer: MEDICARE

## 2020-01-17 ENCOUNTER — PATIENT MESSAGE (OUTPATIENT)
Dept: FAMILY MEDICINE | Facility: CLINIC | Age: 65
End: 2020-01-17

## 2020-01-17 VITALS
HEIGHT: 70 IN | WEIGHT: 172 LBS | BODY MASS INDEX: 24.62 KG/M2 | OXYGEN SATURATION: 98 % | SYSTOLIC BLOOD PRESSURE: 136 MMHG | TEMPERATURE: 98 F | HEART RATE: 77 BPM | DIASTOLIC BLOOD PRESSURE: 80 MMHG

## 2020-01-17 DIAGNOSIS — J18.9 PNEUMONIA OF LEFT LOWER LOBE DUE TO INFECTIOUS ORGANISM: Primary | ICD-10-CM

## 2020-01-17 DIAGNOSIS — R05.9 COUGH: ICD-10-CM

## 2020-01-17 PROCEDURE — 71046 XR CHEST PA AND LATERAL: ICD-10-PCS | Mod: S$GLB,,, | Performed by: RADIOLOGY

## 2020-01-17 PROCEDURE — 71046 X-RAY EXAM CHEST 2 VIEWS: CPT | Mod: S$GLB,,, | Performed by: RADIOLOGY

## 2020-01-17 PROCEDURE — 99214 PR OFFICE/OUTPT VISIT, EST, LEVL IV, 30-39 MIN: ICD-10-PCS | Mod: S$GLB,,, | Performed by: NURSE PRACTITIONER

## 2020-01-17 PROCEDURE — 99214 OFFICE O/P EST MOD 30 MIN: CPT | Mod: S$GLB,,, | Performed by: NURSE PRACTITIONER

## 2020-01-17 RX ORDER — DOXYCYCLINE 100 MG/1
100 CAPSULE ORAL EVERY 12 HOURS
Qty: 14 CAPSULE | Refills: 0 | Status: SHIPPED | OUTPATIENT
Start: 2020-01-17 | End: 2020-01-24

## 2020-01-17 NOTE — Clinical Note
Just fyi. Cough greater than 10 days and chest xray was abnormal. Given his actinomyces hx we decided to treat with doxy. I instructed him to f/u with you next week.

## 2020-01-17 NOTE — PROGRESS NOTES
"Subjective:       Patient ID: Dairusz Urbina is a 64 y.o. male.    Vitals:  height is 5' 10" (1.778 m) and weight is 78 kg (172 lb). His temperature is 98.1 °F (36.7 °C). His blood pressure is 136/80 and his pulse is 77. His oxygen saturation is 98%.     Chief Complaint: Sinus Problem    Pt reports post nasal drip, nasal congestion, dry cough x 10 days. The cough is most bothersome to him. Denies fever, chills, CP, SOB, sinus pain/tenderness. He had actinomyces and fungal infection in his lung 1.5 years ago so he is particularly concerned about the lingering cough.       Sinus Problem   This is a new problem. The current episode started 1 to 4 weeks ago. The problem has been gradually worsening since onset. There has been no fever. Associated symptoms include congestion, coughing and sneezing. Pertinent negatives include no chills, diaphoresis, ear pain, shortness of breath, sinus pressure or sore throat. Treatments tried: benadryl. The treatment provided mild relief.       Constitution: Negative for chills, sweating, fatigue and fever.   HENT: Positive for congestion and postnasal drip. Negative for ear pain, sinus pain, sinus pressure, sore throat and voice change.    Neck: Negative for painful lymph nodes.   Eyes: Negative for eye redness.   Respiratory: Positive for cough. Negative for chest tightness, sputum production, bloody sputum, COPD, shortness of breath, stridor, wheezing and asthma.    Gastrointestinal: Negative for nausea and vomiting.   Musculoskeletal: Negative for muscle ache.   Skin: Negative for rash.   Allergic/Immunologic: Positive for sneezing. Negative for seasonal allergies and asthma.   Hematologic/Lymphatic: Negative for swollen lymph nodes.       Objective:      Physical Exam   Constitutional: He is oriented to person, place, and time. He appears well-developed and well-nourished. He is cooperative.  Non-toxic appearance. He does not have a sickly appearance. He does not appear ill. No " distress.   HENT:   Head: Normocephalic and atraumatic.   Right Ear: Hearing, tympanic membrane, external ear and ear canal normal.   Left Ear: Hearing, tympanic membrane, external ear and ear canal normal.   Nose: Nose normal. No mucosal edema, rhinorrhea or nasal deformity. No epistaxis. Right sinus exhibits no maxillary sinus tenderness and no frontal sinus tenderness. Left sinus exhibits no maxillary sinus tenderness and no frontal sinus tenderness.   Mouth/Throat: Uvula is midline, oropharynx is clear and moist and mucous membranes are normal. No trismus in the jaw. Normal dentition. No uvula swelling. No oropharyngeal exudate, posterior oropharyngeal edema or posterior oropharyngeal erythema.   Eyes: Pupils are equal, round, and reactive to light. Conjunctivae, EOM and lids are normal. No scleral icterus.   Neck: Trachea normal, normal range of motion, full passive range of motion without pain and phonation normal. Neck supple. No neck rigidity. No edema and no erythema present.   Cardiovascular: Normal rate, regular rhythm, normal heart sounds, intact distal pulses and normal pulses.   Pulmonary/Chest: Effort normal and breath sounds normal. No respiratory distress. He has no decreased breath sounds. He has no wheezes. He has no rhonchi. He has no rales.   Abdominal: Normal appearance.   Musculoskeletal: Normal range of motion. He exhibits no edema or deformity.   Lymphadenopathy:     He has no cervical adenopathy.   Neurological: He is alert and oriented to person, place, and time. He exhibits normal muscle tone. Coordination normal.   Skin: Skin is warm, dry, intact, not diaphoretic and not pale.   Psychiatric: He has a normal mood and affect. His speech is normal and behavior is normal. Judgment and thought content normal. Cognition and memory are normal.   Nursing note and vitals reviewed.  X-ray Chest Pa And Lateral    Result Date: 1/17/2020  EXAMINATION: XR CHEST PA AND LATERAL CLINICAL HISTORY: Cough  TECHNIQUE: PA and lateral views of the chest were performed. COMPARISON: 05/02/2019.  08/27/2017.  07/20/2017. FINDINGS: Mediastinal structures are midline.  Cardiac silhouette and pulmonary vascular distribution are normal.  Calcific plaque noted in the arch of this 64-year-old man. 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  Multifocal patchy opacities present in both lungs on chest radiograph dated 08/27/2017 have improved. On today's examination there is mild blunting of the left lateral and posterior costophrenic angles.  There is mild inferior displacement of the oblique fissure suggesting partial atelectasis of the left lower lobe.  These findings are new since the most recent chest radiograph of 05/02/2019. There is also an ill-defined soft tissue nodular opacity on PA chest radiograph overlying the lateral aspect of the right upper lung zone at the level of the 2nd and 5th intercostal spaces raising the question of intrapulmonary nodule.  I recommend chest CT for clarification and further detail in this patient with a history of cough. I detect no convincing evidence of right pleural fluid and no cardiac decompensation, lymph node enlargement, pneumothorax, pneumomediastinum, pneumoperitoneum or significant osseous abnormality.     Abnormal chest radiograph. This report was flagged in Epic as abnormal.. Electronically signed by: Sharla Urias MD Date:    01/17/2020 Time:    15:38        Assessment:       1. Pneumonia of left lower lobe due to infectious organism    2. Cough        Plan:         Pneumonia of left lower lobe due to infectious organism  -     doxycycline (VIBRAMYCIN) 100 MG Cap; Take 1 capsule (100 mg total) by mouth every 12 (twelve) hours. for 7 days  Dispense: 14 capsule; Refill: 0    Cough  -     X-Ray Chest PA And Lateral; Future; Expected date: 01/17/2020  -     doxycycline (VIBRAMYCIN) 100 MG Cap; Take 1 capsule (100 mg total)  by mouth every 12 (twelve) hours. for 7 days  Dispense: 14 capsule; Refill: 0         Reviewed previous pertinent office visits, PMH, PSH, fam hx  Chest xray was abnormal. Reviewed his previous pulmonary hx and discussed case with Dr. Angeles who recommended we treat him with antibiotics given his complex pulmonary hx.  Will treat with doxy and instructed pt to f/u with PCP asap  Advised on return/follow-up precautions. Advised on ER precautions. Answered all patient questions. Patient verbalized understanding and voiced agreement with current treatment plan.      Patient Instructions   Follow up with PCP as soon as possible

## 2020-01-20 ENCOUNTER — TELEPHONE (OUTPATIENT)
Dept: FAMILY MEDICINE | Facility: CLINIC | Age: 65
End: 2020-01-20

## 2020-01-20 NOTE — PROGRESS NOTES
Patient Name: Dariusz Urbina    : 1955  MRN: [id number]    Subjective:  Dariusz is a 64 y.o. male who presents today for:    Chief Complaint   Patient presents with    Follow-up       HPI  Patient has multiple medical diagnoses as listed below in the history. He presents for follow up after  visit on 2020. He was treated for pneumonia of left lower lobe with Doxycycline with 3 days remaining. He reports improvement of cough and post nasal drip. He is still taking the antibiotic. He denies fever, chills, body aches, cough, wheezing, dyspnea, or chest pain.     Of note, the CXR showed suspicious changes of right upper lobe when compared to previous study, CT is recommended. Also showed possible infectious/inflammatory process of left lower lobe. He has history of fungal infection of lung and lung mass. He was previously treated and followed by ID.     Past Medical History  Past Medical History:   Diagnosis Date    Decreased libido 2013    Dermatitis 10/12/2012    Hyperlipidemia 2013       Past Surgical History  Past Surgical History:   Procedure Laterality Date    COLONOSCOPY      HIP REPLACEMENT ARTHROPLASTY  2019    LUMBAR FUSION      titanium rods    TONSILLECTOMY         Family History  No family history on file.    Social History  Social History     Socioeconomic History    Marital status:      Spouse name: Not on file    Number of children: Not on file    Years of education: Not on file    Highest education level: Not on file   Occupational History    Not on file   Social Needs    Financial resource strain: Not hard at all    Food insecurity:     Worry: Never true     Inability: Never true    Transportation needs:     Medical: No     Non-medical: No   Tobacco Use    Smoking status: Former Smoker     Packs/day: 1.00     Years: 10.00     Pack years: 10.00     Last attempt to quit: 1989     Years since quittin.5    Smokeless tobacco: Never Used   Substance  and Sexual Activity    Alcohol use: No     Frequency: 2-4 times a month     Drinks per session: 1 or 2     Binge frequency: Never    Drug use: No    Sexual activity: Not on file   Lifestyle    Physical activity:     Days per week: 1 day     Minutes per session: 0 min    Stress: Not at all   Relationships    Social connections:     Talks on phone: More than three times a week     Gets together: Three times a week     Attends Ep[employer]opalian service: Not on file     Active member of club or organization: Yes     Attends meetings of clubs or organizations: More than 4 times per year     Relationship status:    Other Topics Concern    Not on file   Social History Narrative    Not on file       Current Medications  Current Outpatient Medications on File Prior to Visit   Medication Sig Dispense Refill    ascorbic acid, vitamin C, (VITAMIN C) 1000 MG tablet Take 1,000 mg by mouth.      aspirin 81 MG Chew Take 81 mg by mouth once daily.      cyanocobalamin (VITAMIN B-12) 1000 MCG tablet Take 100 mcg by mouth.      doxycycline (VIBRAMYCIN) 100 MG Cap Take 1 capsule (100 mg total) by mouth every 12 (twelve) hours. for 7 days 14 capsule 0    levocetirizine (XYZAL) 5 MG tablet Take 1 tablet (5 mg total) by mouth every evening. 30 tablet 11    oxyCODONE-acetaminophen (PERCOCET) 5-325 mg per tablet   0    pravastatin (PRAVACHOL) 80 MG tablet Take 1 tablet (80 mg total) by mouth once daily. 90 tablet 12    vitamin D (VITAMIN D3) 1000 units Tab Take 1,000 Units by mouth.       No current facility-administered medications on file prior to visit.        Allergies   Review of patient's allergies indicates:  No Known Allergies      ROS  Review of Systems   Constitutional: Negative for chills, fatigue and fever.   HENT: Negative for congestion, ear pain, postnasal drip, rhinorrhea, sinus pressure, sinus pain, sneezing and sore throat.    Respiratory: Negative for cough, shortness of breath and wheezing.   "  Cardiovascular: Negative for chest pain and palpitations.   Gastrointestinal: Negative for abdominal pain and nausea.   Musculoskeletal: Negative for myalgias.   Skin: Negative for rash.   Neurological: Negative for light-headedness and headaches.   Hematological: Negative for adenopathy.         Objective:    /78 (BP Location: Right arm, Patient Position: Sitting, BP Method: Medium (Manual))   Pulse 74   Temp 97.8 °F (36.6 °C) (Oral)   Ht 5' 10" (1.778 m)   Wt 81.4 kg (179 lb 7.3 oz)   SpO2 97%   BMI 25.75 kg/m²     Physical Exam   Constitutional: Vital signs are normal.   HENT:   Head: Normocephalic.   Right Ear: Hearing, tympanic membrane, external ear and ear canal normal. Tympanic membrane is not bulging. No middle ear effusion.   Left Ear: Hearing, tympanic membrane, external ear and ear canal normal. Tympanic membrane is not bulging.  No middle ear effusion.   Nose: Nose normal. Right sinus exhibits no maxillary sinus tenderness and no frontal sinus tenderness. Left sinus exhibits no maxillary sinus tenderness and no frontal sinus tenderness.   Mouth/Throat: Uvula is midline, oropharynx is clear and moist and mucous membranes are normal. No oropharyngeal exudate, posterior oropharyngeal edema or posterior oropharyngeal erythema.   Eyes: Pupils are equal, round, and reactive to light. Conjunctivae, EOM and lids are normal.   Cardiovascular: Normal rate, regular rhythm, S1 normal, S2 normal and normal heart sounds.   Pulmonary/Chest: Effort normal and breath sounds normal. He has no wheezes.   Lymphadenopathy:     He has no cervical adenopathy.        Right: No supraclavicular adenopathy present.        Left: No supraclavicular adenopathy present.   Neurological: He is alert.   Skin: Skin is warm, dry and intact. No rash noted.   Psychiatric: He has a normal mood and affect. Judgment normal.       Assessment/Plan:  Dariusz Urbina is a 64 y.o. male who presents today for :    Dariusz was seen today " for follow-up.    Diagnoses and all orders for this visit:    Pneumonia of left lower lobe due to infectious organism  Abnormal CXR  -     CT Chest Without Contrast; Future  Given new abnormalities on CXR of right upper lobe when compared to previous studies it is recommended the patient have follow up CT scan. I will have patient complete current course of antibiotic therapy for pneumonia of the lower left lobe.  He will schedule CT scan for 3-4 weeks from now. Patient is stable today, with unremarkable physical exam. He reports improved cough and associated symptoms. He will complete antibiotic course. Discussed ED precautions. Advised to follow up in clinic if symptoms persist or worsen. Patient gave verbal understanding and agreement of plan      Problem list issues addressed during this visit    Lung mass  Followed by ID and treated for fungal infection of the lungs.  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         Encouraged to call/return to clinic if symptoms persist or worsen    Sherron Hilario PA-C  [facility] Family Med/ Internal Med

## 2020-01-20 NOTE — TELEPHONE ENCOUNTER
----- Message from Johnathon Mohamud sent at 1/20/2020  8:49 AM CST -----  Contact: Self  Type: Patient Call Back    Who called:Self    What is the request in detail:He sent a message via [employer] and would like response today.    Can the clinic reply by [facility]?Yes    Would the patient rather a call back or a response via [facility]? Call    Best call back number:[phone]    Additional Information:n/a

## 2020-01-21 ENCOUNTER — OFFICE VISIT (OUTPATIENT)
Dept: FAMILY MEDICINE | Facility: CLINIC | Age: 65
End: 2020-01-21
Payer: MEDICARE

## 2020-01-21 VITALS
HEART RATE: 74 BPM | TEMPERATURE: 98 F | BODY MASS INDEX: 25.69 KG/M2 | WEIGHT: 179.44 LBS | SYSTOLIC BLOOD PRESSURE: 132 MMHG | HEIGHT: 70 IN | DIASTOLIC BLOOD PRESSURE: 78 MMHG | OXYGEN SATURATION: 97 %

## 2020-01-21 DIAGNOSIS — R91.8 LUNG MASS: ICD-10-CM

## 2020-01-21 DIAGNOSIS — R93.89 ABNORMAL CXR: ICD-10-CM

## 2020-01-21 DIAGNOSIS — A42.9 ACTINOMYCOSIS DUE TO ACTINOMYCES ODONTOLYTICUS: ICD-10-CM

## 2020-01-21 DIAGNOSIS — J18.9 PNEUMONIA OF LEFT LOWER LOBE DUE TO INFECTIOUS ORGANISM: Primary | ICD-10-CM

## 2020-01-21 PROCEDURE — 99214 OFFICE O/P EST MOD 30 MIN: CPT | Mod: HCNC,S$GLB,, | Performed by: PHYSICIAN ASSISTANT

## 2020-01-21 PROCEDURE — 99999 PR PBB SHADOW E&M-EST. PATIENT-LVL III: CPT | Mod: PBBFAC,HCNC,, | Performed by: PHYSICIAN ASSISTANT

## 2020-01-21 PROCEDURE — 3008F PR BODY MASS INDEX (BMI) DOCUMENTED: ICD-10-PCS | Mod: HCNC,CPTII,S$GLB, | Performed by: PHYSICIAN ASSISTANT

## 2020-01-21 PROCEDURE — 1101F PT FALLS ASSESS-DOCD LE1/YR: CPT | Mod: HCNC,CPTII,S$GLB, | Performed by: PHYSICIAN ASSISTANT

## 2020-01-21 PROCEDURE — 3008F BODY MASS INDEX DOCD: CPT | Mod: HCNC,CPTII,S$GLB, | Performed by: PHYSICIAN ASSISTANT

## 2020-01-21 PROCEDURE — 99214 PR OFFICE/OUTPT VISIT, EST, LEVL IV, 30-39 MIN: ICD-10-PCS | Mod: HCNC,S$GLB,, | Performed by: PHYSICIAN ASSISTANT

## 2020-01-21 PROCEDURE — 99999 PR PBB SHADOW E&M-EST. PATIENT-LVL III: ICD-10-PCS | Mod: PBBFAC,HCNC,, | Performed by: PHYSICIAN ASSISTANT

## 2020-01-21 PROCEDURE — 1101F PR PT FALLS ASSESS DOC 0-1 FALLS W/OUT INJ PAST YR: ICD-10-PCS | Mod: HCNC,CPTII,S$GLB, | Performed by: PHYSICIAN ASSISTANT

## 2020-01-21 NOTE — ASSESSMENT & PLAN NOTE
Followed by ID and treated for fungal infection of the lungs.  Will continue to monitor, patient is clinically stable with no acute symptoms today

## 2020-01-29 ENCOUNTER — PATIENT MESSAGE (OUTPATIENT)
Dept: FAMILY MEDICINE | Facility: CLINIC | Age: 65
End: 2020-01-29

## 2020-02-06 ENCOUNTER — HOSPITAL ENCOUNTER (OUTPATIENT)
Dept: RADIOLOGY | Facility: HOSPITAL | Age: 65
Discharge: HOME | End: 2020-02-06
Attending: PHYSICIAN ASSISTANT
Payer: MEDICARE

## 2020-02-06 DIAGNOSIS — R93.89 ABNORMAL CT OF THE CHEST: Primary | ICD-10-CM

## 2020-02-06 DIAGNOSIS — R93.89 ABNORMAL CXR: ICD-10-CM

## 2020-02-06 PROCEDURE — 71250 CT THORAX DX C-: CPT | Mod: TC,HCNC

## 2020-02-06 PROCEDURE — 71250 CT CHEST WITHOUT CONTRAST: ICD-10-PCS | Mod: 26,HCNC,, | Performed by: RADIOLOGY

## 2020-02-06 PROCEDURE — 71250 CT THORAX DX C-: CPT | Mod: 26,HCNC,, | Performed by: RADIOLOGY

## 2020-02-07 DIAGNOSIS — Z11.59 NEED FOR HEPATITIS C SCREENING TEST: ICD-10-CM

## 2020-02-17 ENCOUNTER — TELEPHONE (OUTPATIENT)
Dept: PULMONOLOGY | Facility: CLINIC | Age: 65
End: 2020-02-17

## 2020-02-17 NOTE — TELEPHONE ENCOUNTER
----- Message from Ileana Banuelos sent at 2/17/2020  8:22 AM CST -----  Contact: Self/[phone]  Type:  Sooner Appointment Request    Patient is requesting a sooner appointment.  Patient declined first available appointment listed as well as another facility and provider .  Patient will not accept being placed on the waitlist and is requesting a message be sent to doctor.    Name of Caller: Patient     When is the first available appointment? 03/06/20    Symptoms: Abnormal CT    Would the patient rather a call back or a response via [facility]? Call back    Best Call Back Number: [phone]    Thank you.

## 2020-02-17 NOTE — TELEPHONE ENCOUNTER
Contacted patient. Full name and  verified. Patient has an appointment scheduled in Dr. Saini's earliest appointment slot, . Offered patient an appointment on  with Nurse Practioner Kerri- patient declined. Patient is already added to the waitlist. Patient understands that he will be contacted if a sooner appointment becomes available. No additional questions or concerns at this time.

## 2020-03-04 ENCOUNTER — PATIENT OUTREACH (OUTPATIENT)
Dept: ADMINISTRATIVE | Facility: OTHER | Age: 65
End: 2020-03-04

## 2020-03-06 ENCOUNTER — OFFICE VISIT (OUTPATIENT)
Dept: PULMONOLOGY | Facility: CLINIC | Age: 65
End: 2020-03-06
Payer: MEDICARE

## 2020-03-06 ENCOUNTER — HOSPITAL ENCOUNTER (OUTPATIENT)
Dept: RESPIRATORY THERAPY | Facility: HOSPITAL | Age: 65
Discharge: HOME | End: 2020-03-06
Attending: INTERNAL MEDICINE
Payer: MEDICARE

## 2020-03-06 VITALS
DIASTOLIC BLOOD PRESSURE: 82 MMHG | OXYGEN SATURATION: 97 % | SYSTOLIC BLOOD PRESSURE: 146 MMHG | BODY MASS INDEX: 25.06 KG/M2 | HEART RATE: 80 BPM | WEIGHT: 175.06 LBS | HEIGHT: 70 IN

## 2020-03-06 VITALS — HEART RATE: 70 BPM | OXYGEN SATURATION: 99 % | RESPIRATION RATE: 18 BRPM

## 2020-03-06 DIAGNOSIS — R93.89 ABNORMAL CT OF THE CHEST: ICD-10-CM

## 2020-03-06 DIAGNOSIS — R91.1 LUNG NODULE: ICD-10-CM

## 2020-03-06 LAB
BRPFT: ABNORMAL
DLCO ADJ PRE: 22.28 ML/(MIN*MMHG) (ref 20.98–34.83)
DLCO SINGLE BREATH LLN: 20.98
DLCO SINGLE BREATH PRE REF: 79.9 %
DLCO SINGLE BREATH REF: 27.9
DLCOC SBVA LLN: 2.77
DLCOC SBVA PRE REF: 98.9 %
DLCOC SBVA REF: 3.95
DLCOC SINGLE BREATH LLN: 20.98
DLCOC SINGLE BREATH PRE REF: 79.9 %
DLCOC SINGLE BREATH REF: 27.9
DLCOVA LLN: 2.77
DLCOVA PRE REF: 98.9 %
DLCOVA PRE: 3.91 ML/(MIN*MMHG*L) (ref 2.77–5.13)
DLCOVA REF: 3.95
DLVAADJ PRE: 3.91 ML/(MIN*MMHG*L) (ref 2.77–5.13)
ERVN2 LLN: 1.12
ERVN2 PRE REF: 150.3 %
ERVN2 PRE: 1.68 L (ref 1.12–1.12)
ERVN2 REF: 1.12
FEF 25 75 CHG: 37.3 %
FEF 25 75 LLN: 1.21
FEF 25 75 POST REF: 82 %
FEF 25 75 PRE REF: 59.7 %
FEF 25 75 REF: 2.64
FET100 CHG: -19.5 %
FEV1 CHG: 2.3 %
FEV1 FVC CHG: 5.7 %
FEV1 FVC LLN: 64
FEV1 FVC POST REF: 93.1 %
FEV1 FVC PRE REF: 88.1 %
FEV1 FVC REF: 77
FEV1 LLN: 2.46
FEV1 POST REF: 88.3 %
FEV1 PRE REF: 86.3 %
FEV1 REF: 3.34
FRCN2 LLN: 2.65
FRCN2 PRE REF: 81.7 %
FRCN2 REF: 3.64
FVC CHG: -3.2 %
FVC LLN: 3.28
FVC POST REF: 94.5 %
FVC PRE REF: 97.6 %
FVC REF: 4.37
IVC PRE: 4.04 L (ref 3.28–5.46)
IVC SINGLE BREATH LLN: 3.28
IVC SINGLE BREATH PRE REF: 92.4 %
IVC SINGLE BREATH REF: 4.37
PEF CHG: -29.7 %
PEF LLN: 6.46
PEF POST REF: 58 %
PEF PRE REF: 82.5 %
PEF REF: 8.75
POST FEF 25 75: 2.17 L/S (ref 1.21–4.07)
POST FET 100: 11.06 SEC
POST FEV1 FVC: 71.41 % (ref 63.87–89.46)
POST FEV1: 2.95 L (ref 2.46–4.22)
POST FVC: 4.13 L (ref 3.28–5.46)
POST PEF: 5.08 L/S (ref 6.46–11.05)
PRE DLCO: 22.28 ML/(MIN*MMHG) (ref 20.98–34.83)
PRE FEF 25 75: 1.58 L/S (ref 1.21–4.07)
PRE FET 100: 13.75 SEC
PRE FEV1 FVC: 67.57 % (ref 63.87–89.46)
PRE FEV1: 2.88 L (ref 2.46–4.22)
PRE FRC N2: 2.97 L
PRE FVC: 4.26 L (ref 3.28–5.46)
PRE PEF: 7.22 L/S (ref 6.46–11.05)
RVN2 LLN: 1.84
RVN2 PRE REF: 39.7 %
RVN2 PRE: 1 L (ref 1.84–3.19)
RVN2 REF: 2.52
RVN2TLCN2 LLN: 30.33
RVN2TLCN2 PRE REF: 48.4 %
RVN2TLCN2 PRE: 19.01 % (ref 30.33–48.29)
RVN2TLCN2 REF: 39.31
TLCN2 LLN: 5.91
TLCN2 PRE REF: 74.5 %
TLCN2 PRE: 5.26 L (ref 5.91–8.21)
TLCN2 REF: 7.06
VA PRE: 5.71 L (ref 6.91–6.91)
VA SINGLE BREATH LLN: 6.91
VA SINGLE BREATH PRE REF: 82.5 %
VA SINGLE BREATH REF: 6.91
VCMAXN2 LLN: 3.28
VCMAXN2 PRE REF: 97.5 %
VCMAXN2 PRE: 4.26 L (ref 3.28–5.46)
VCMAXN2 REF: 4.37

## 2020-03-06 PROCEDURE — 94060 PR EVAL OF BRONCHOSPASM: ICD-10-PCS | Mod: 26,HCNC,, | Performed by: INTERNAL MEDICINE

## 2020-03-06 PROCEDURE — 1101F PT FALLS ASSESS-DOCD LE1/YR: CPT | Mod: HCNC,CPTII,S$GLB, | Performed by: INTERNAL MEDICINE

## 2020-03-06 PROCEDURE — 94727 PR PULM FUNCTION TEST BY GAS: ICD-10-PCS | Mod: 26,HCNC,, | Performed by: INTERNAL MEDICINE

## 2020-03-06 PROCEDURE — 3008F BODY MASS INDEX DOCD: CPT | Mod: HCNC,CPTII,S$GLB, | Performed by: INTERNAL MEDICINE

## 2020-03-06 PROCEDURE — 25000242 PHARM REV CODE 250 ALT 637 W/ HCPCS: Mod: HCNC | Performed by: INTERNAL MEDICINE

## 2020-03-06 PROCEDURE — 99999 PR PBB SHADOW E&M-EST. PATIENT-LVL IV: ICD-10-PCS | Mod: PBBFAC,HCNC,, | Performed by: INTERNAL MEDICINE

## 2020-03-06 PROCEDURE — 99215 PR OFFICE/OUTPT VISIT, EST, LEVL V, 40-54 MIN: ICD-10-PCS | Mod: HCNC,S$GLB,, | Performed by: INTERNAL MEDICINE

## 2020-03-06 PROCEDURE — 1101F PR PT FALLS ASSESS DOC 0-1 FALLS W/OUT INJ PAST YR: ICD-10-PCS | Mod: HCNC,CPTII,S$GLB, | Performed by: INTERNAL MEDICINE

## 2020-03-06 PROCEDURE — 99215 OFFICE O/P EST HI 40 MIN: CPT | Mod: HCNC,S$GLB,, | Performed by: INTERNAL MEDICINE

## 2020-03-06 PROCEDURE — 94727 GAS DIL/WSHOT DETER LNG VOL: CPT | Mod: 26,HCNC,, | Performed by: INTERNAL MEDICINE

## 2020-03-06 PROCEDURE — 94729 DIFFUSING CAPACITY: CPT | Mod: 26,HCNC,, | Performed by: INTERNAL MEDICINE

## 2020-03-06 PROCEDURE — 3008F PR BODY MASS INDEX (BMI) DOCUMENTED: ICD-10-PCS | Mod: HCNC,CPTII,S$GLB, | Performed by: INTERNAL MEDICINE

## 2020-03-06 PROCEDURE — 94729 PR C02/MEMBANE DIFFUSE CAPACITY: ICD-10-PCS | Mod: 26,HCNC,, | Performed by: INTERNAL MEDICINE

## 2020-03-06 PROCEDURE — 94060 EVALUATION OF WHEEZING: CPT | Mod: 26,HCNC,, | Performed by: INTERNAL MEDICINE

## 2020-03-06 PROCEDURE — 99999 PR PBB SHADOW E&M-EST. PATIENT-LVL IV: CPT | Mod: PBBFAC,HCNC,, | Performed by: INTERNAL MEDICINE

## 2020-03-06 RX ORDER — ALBUTEROL SULFATE 2.5 MG/.5ML
2.5 SOLUTION RESPIRATORY (INHALATION) ONCE
Status: COMPLETED | OUTPATIENT
Start: 2020-03-06 | End: 2020-03-06

## 2020-03-06 RX ADMIN — ALBUTEROL SULFATE 2.5 MG: 2.5 SOLUTION RESPIRATORY (INHALATION) at 11:03

## 2020-03-06 NOTE — PROGRESS NOTES
Dariusz Urbina  was seen as a new patient at the request  Sherron Hilario PA-C for the evaluation of  Abnormal ct of chest.    CHIEF COMPLAINT:  Abnormal Ct Scan      HISTORY OF PRESENT ILLNESS: Dariusz Urbina is a 64 y.o. male  has a past medical history of Decreased libido (11/11/2013), Dermatitis (10/12/2012), and Hyperlipidemia (11/11/2013).  Patient was seen by Dr. Yao in 2017 for abnormal CT of chest.  CT revealed bilateral nodular opacities.  S/p ct guided biopsy on 6/6/17 with granulomatous inflammation with central necrosis.  S/p VATS with wedge biopsy rul.  Culture revealed actinomycosis.  Patient was seen by ID (Darshan).  S/p iv and po antibiotics x 1 year.  Last antibiotics was fluconazole and doxycyline x  9 months.  Stopped antibiotics 9/2018.      Recently, patient underwent ct of chest by chava Hilario for cough with abnormal findings.  Patient was referred to pulmonary for further inputs.      Today, patient denied coughing.  + chest discomfort with supine position.  No fever/chill.  Patient routinely exercise with elliptical and bike riding 4 times per week.  No hemoptysis.  No weight loss.      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 09/15/2017    Fungal infection of lung 09/15/2017    Decreased range of motion of right lower extremity 11/20/2018    Pain in right hip 11/20/2018    Decreased strength 11/20/2018    Impaired motor control 11/20/2018    Abnormal CT of the chest 03/06/2020     Resolved Ambulatory Problems     Diagnosis Date Noted    Encounter for surgical wound dressing change 11/11/2013    PICC (peripherally inserted central catheter) removal  2017     No Additional Past Medical History                PAST SURGICAL HISTORY:    Past Surgical History:   Procedure Laterality Date    COLONOSCOPY      HIP REPLACEMENT ARTHROPLASTY  2019    LUMBAR FUSION      titanium rods    TONSILLECTOMY           FAMILY HISTORY:                No family history on file.    SOCIAL HISTORY:          Tobacco:   Social History     Tobacco Use   Smoking Status Former Smoker    Packs/day: 1.00    Years: 10.00    Pack years: 10.00    Last attempt to quit: 1989    Years since quittin.6   Smokeless Tobacco Never Used     alcohol use:    Social History     Substance and Sexual Activity   Alcohol Use No    Frequency: 2-4 times a month    Drinks per session: 1 or 2    Binge frequency: Never               Occupation:  Commercial real estate salesman    ALLERGIES:  Review of patient's allergies indicates:  No Known Allergies    CURRENT MEDICATIONS:    Current Outpatient Medications   Medication Sig Dispense Refill    ascorbic acid, vitamin C, (VITAMIN C) 1000 MG tablet Take 1,000 mg by mouth.      aspirin 81 MG Chew Take 81 mg by mouth once daily.      cyanocobalamin (VITAMIN B-12) 1000 MCG tablet Take 100 mcg by mouth.      levocetirizine (XYZAL) 5 MG tablet Take 1 tablet (5 mg total) by mouth every evening. 30 tablet 11    oxyCODONE-acetaminophen (PERCOCET) 5-325 mg per tablet   0    pravastatin (PRAVACHOL) 80 MG tablet Take 1 tablet (80 mg total) by mouth once daily. 90 tablet 12    vitamin D (VITAMIN D3) 1000 units Tab Take 1,000 Units by mouth.       No current facility-administered medications for this visit.                   REVIEW OF SYSTEMS:     Pulmonary related symptoms as per HPI.  Gen:  no weight loss, no fever, no night sweat  HEENT:  no visual changes, no sore throat, no hearing loss  CV:  No chest pain, no orthopnea, no PND  GI:  no melena, no hematochezia, no diarhea, no constipation.  :  no dysuria, no hematuria, no hesistancy, no  "dribbling  Neuro:  no syncope, no vertigo, no tinitus  Psych:  No homocide or suicide ideation; no depression.  Endocrine:  No heat or cold intolerance.  Sleep:  No snoring; no witnessed apnea.  Feeling rested upon awake.    Otherwise, a balance of systems reviewed is negative.          PHYSICAL EXAM:  Vitals:    03/06/20 0859   BP: (!) 146/82   Pulse: 80   SpO2: 97%   Weight: 79.4 kg (175 lb 0.7 oz)   Height: 5' 10" (1.778 m)   PainSc:   1   PainLoc: Back     Body mass index is 25.12 kg/m².     GENERAL:  well develop; no apparent distress  HEENT:  no nasal congestion; no discharge noted; class 50 modified mallampatti.   NECK:  supple; no palpable masses.  CARDIO: regular rate and rhythm  PULM:  clear to auscultation bilaterally; no intercostals retractions; no accessory muscle usage   ABDOMEN:  soft nontender/nondistended.  +bowel sound  EXTREMITIES no cce  NEURO:  CN II-XII intact.  5/5 motor in all extremities.  sensation grossly intact   to light touch.  PSYCH:  normal affect.  Alert and oriented x 4    LABS  Pulmonary Functions Testing Results(personally reviewed):  7/13/17 ratio of 76%; fvc 78% (3.23L); fev1 60% (1.94L); tlc 81%(6.26); dlco 82%  3/6/20 ratio of 77%; fvc 98% (4.26); fev1 86% (2.86); tlc 74% (5.2); dlco 79%  ABG (personally reviewed):  none  CXR (personally reviewed):  Chronic blunting of left costophrenic angle.    CT CHEST(personally reviewed):  2/6/20 linear stranding in lll.  Trace effusion.  When compared to 6/12/17 significant improvement in aeration.  However, when compared 2018 ct, linear stranding is new.      Echo 5/3/19  · The patient exercised for 8 minutes and 17 seconds on a Jorge protocol, corresponding to a functional capacity of 10 METS, achieving a peak heart rate of 139 bpm, which is 89% of the age predicted maximum heart rate  · Stress echo is negative for ischemia. No wall motion abnormality seen at rest or post stress  · Normal left ventricular systolic function. The " estimated ejection fraction is 65%  · The EKG portion of this study is negative for myocardial ischemia.  · The patient reported no symptoms during the stress test.  · Normal LV diastolic function.  · Normal right ventricular systolic function.  · Mild left atrial enlargement.  · Mild mitral regurgitation.  · Normal central venous pressure (3 mm Hg).  · The estimated PA systolic pressure is 33 mm Hg  · There were no arrhythmias during stress.     ASSESSMENT/PLAN  Problem List Items Addressed This Visit     Abnormal CT of the chest    Overview     Actinomycosis s/p prolonged antibioitcs.  Ct with significant improvement of aeration when compared to 2017.  However, lll stranding is new when compared to 2018 ct.  Suspect scarring process.  pft with improved fvc and fev1 c/w radiographic improvement.           Current Assessment & Plan     Will mointor with serial ct.             Other Visit Diagnoses     Lung nodule        Relevant Orders    CT Chest Without Contrast    Complete PFT with bronchodilator (Completed)        Patient will No follow-ups on file. with md.    CC: Send copy of this note to Sherron Hilario PA-C     This is 50 minutes visit, over 50% of time spent in direct consultation with patient.

## 2020-03-06 NOTE — LETTER
March 6, 2020      Sherron Hilario PA-C  [address] [facility]  [place] [address]           [facility]  [address]  [place] [address]  Phone: [phone]  Fax: [phone]          Patient: Dariusz Urbina   MR Number: [id number]   YOB: 1955   Date of Visit: 3/6/2020       Dear Sherron Hilario:    Thank you for referring Dariusz Urbina to me for evaluation. Attached you will find relevant portions of my assessment and plan of care.    If you have questions, please do not hesitate to call me. I look forward to following Dariusz Urbina along with you.    Sincerely,    Vishal Saini MD    Enclosure  CC:  No Recipients    If you would like to receive this communication electronically, please contact [email or web address] or [phone] to request more information on [employer] access.    For providers and/or their staff who would like to refer a patient to [facility], please contact us through our one-stop-shop provider referral line, [facility], at [phone].    If you feel you have received this communication in error or would no longer like to receive these types of communications, please e-mail [email or web address]

## 2020-03-08 ENCOUNTER — PATIENT MESSAGE (OUTPATIENT)
Dept: PULMONOLOGY | Facility: CLINIC | Age: 65
End: 2020-03-08

## 2020-06-21 ENCOUNTER — PATIENT MESSAGE (OUTPATIENT)
Dept: FAMILY MEDICINE | Facility: CLINIC | Age: 65
End: 2020-06-21

## 2020-06-24 ENCOUNTER — HOSPITAL ENCOUNTER (OUTPATIENT)
Dept: RADIOLOGY | Facility: HOSPITAL | Age: 65
Discharge: HOME | End: 2020-06-24
Attending: INTERNAL MEDICINE
Payer: MEDICARE

## 2020-06-24 DIAGNOSIS — R91.1 LUNG NODULE: ICD-10-CM

## 2020-06-24 PROCEDURE — 71250 CT THORAX DX C-: CPT | Mod: 26,HCNC,, | Performed by: RADIOLOGY

## 2020-06-24 PROCEDURE — 71250 CT THORAX DX C-: CPT | Mod: TC,HCNC

## 2020-06-24 PROCEDURE — 71250 CT CHEST WITHOUT CONTRAST: ICD-10-PCS | Mod: 26,HCNC,, | Performed by: RADIOLOGY

## 2020-10-27 ENCOUNTER — PATIENT MESSAGE (OUTPATIENT)
Dept: FAMILY MEDICINE | Facility: CLINIC | Age: 65
End: 2020-10-27

## 2020-10-27 DIAGNOSIS — Z00.00 ROUTINE MEDICAL EXAM: Primary | ICD-10-CM

## 2020-10-27 DIAGNOSIS — R73.9 HYPERGLYCEMIA: ICD-10-CM

## 2020-10-27 DIAGNOSIS — E78.5 HYPERLIPIDEMIA, UNSPECIFIED HYPERLIPIDEMIA TYPE: ICD-10-CM

## 2020-10-27 DIAGNOSIS — N40.0 BENIGN PROSTATIC HYPERPLASIA, UNSPECIFIED WHETHER LOWER URINARY TRACT SYMPTOMS PRESENT: ICD-10-CM

## 2020-11-03 ENCOUNTER — PATIENT MESSAGE (OUTPATIENT)
Dept: FAMILY MEDICINE | Facility: CLINIC | Age: 65
End: 2020-11-03

## 2020-11-11 ENCOUNTER — LAB VISIT (OUTPATIENT)
Dept: LAB | Facility: HOSPITAL | Age: 65
End: 2020-11-11
Attending: INTERNAL MEDICINE
Payer: MEDICARE

## 2020-11-11 DIAGNOSIS — E78.5 HYPERLIPIDEMIA, UNSPECIFIED HYPERLIPIDEMIA TYPE: ICD-10-CM

## 2020-11-11 DIAGNOSIS — Z11.59 NEED FOR HEPATITIS C SCREENING TEST: ICD-10-CM

## 2020-11-11 DIAGNOSIS — N40.0 BENIGN PROSTATIC HYPERPLASIA, UNSPECIFIED WHETHER LOWER URINARY TRACT SYMPTOMS PRESENT: ICD-10-CM

## 2020-11-11 DIAGNOSIS — R73.9 HYPERGLYCEMIA: ICD-10-CM

## 2020-11-11 DIAGNOSIS — Z00.00 ROUTINE MEDICAL EXAM: ICD-10-CM

## 2020-11-11 LAB
ALBUMIN SERPL BCP-MCNC: 3.7 G/DL (ref 3.5–5.2)
ALP SERPL-CCNC: 67 U/L (ref 55–135)
ALT SERPL W/O P-5'-P-CCNC: 14 U/L (ref 10–44)
ANION GAP SERPL CALC-SCNC: 9 MMOL/L (ref 8–16)
AST SERPL-CCNC: 19 U/L (ref 10–40)
BASOPHILS # BLD AUTO: 0.06 K/UL (ref 0–0.2)
BASOPHILS NFR BLD: 1 % (ref 0–1.9)
BILIRUB SERPL-MCNC: 0.4 MG/DL (ref 0.1–1)
BUN SERPL-MCNC: 14 MG/DL (ref 8–23)
CALCIUM SERPL-MCNC: 9.1 MG/DL (ref 8.7–10.5)
CHLORIDE SERPL-SCNC: 104 MMOL/L (ref 95–110)
CHOLEST SERPL-MCNC: 205 MG/DL (ref 120–199)
CHOLEST/HDLC SERPL: 4.5 {RATIO} (ref 2–5)
CO2 SERPL-SCNC: 25 MMOL/L (ref 23–29)
COMPLEXED PSA SERPL-MCNC: 2.3 NG/ML (ref 0–4)
CREAT SERPL-MCNC: 1 MG/DL (ref 0.5–1.4)
DIFFERENTIAL METHOD: ABNORMAL
EOSINOPHIL # BLD AUTO: 0.2 K/UL (ref 0–0.5)
EOSINOPHIL NFR BLD: 2.5 % (ref 0–8)
ERYTHROCYTE [DISTWIDTH] IN BLOOD BY AUTOMATED COUNT: 13.9 % (ref 11.5–14.5)
EST. GFR  (AFRICAN AMERICAN): >60 ML/MIN/1.73 M^2
EST. GFR  (NON AFRICAN AMERICAN): >60 ML/MIN/1.73 M^2
ESTIMATED AVG GLUCOSE: 137 MG/DL (ref 68–131)
GLUCOSE SERPL-MCNC: 126 MG/DL (ref 70–110)
HBA1C MFR BLD HPLC: 6.4 % (ref 4–5.6)
HCT VFR BLD AUTO: 41.7 % (ref 40–54)
HDLC SERPL-MCNC: 46 MG/DL (ref 40–75)
HDLC SERPL: 22.4 % (ref 20–50)
HGB BLD-MCNC: 13.1 G/DL (ref 14–18)
IMM GRANULOCYTES # BLD AUTO: 0.01 K/UL (ref 0–0.04)
IMM GRANULOCYTES NFR BLD AUTO: 0.2 % (ref 0–0.5)
LDLC SERPL CALC-MCNC: 138.6 MG/DL (ref 63–159)
LYMPHOCYTES # BLD AUTO: 1.2 K/UL (ref 1–4.8)
LYMPHOCYTES NFR BLD: 20.4 % (ref 18–48)
MCH RBC QN AUTO: 27 PG (ref 27–31)
MCHC RBC AUTO-ENTMCNC: 31.4 G/DL (ref 32–36)
MCV RBC AUTO: 86 FL (ref 82–98)
MONOCYTES # BLD AUTO: 0.8 K/UL (ref 0.3–1)
MONOCYTES NFR BLD: 13 % (ref 4–15)
NEUTROPHILS # BLD AUTO: 3.8 K/UL (ref 1.8–7.7)
NEUTROPHILS NFR BLD: 62.9 % (ref 38–73)
NONHDLC SERPL-MCNC: 159 MG/DL
NRBC BLD-RTO: 0 /100 WBC
PLATELET # BLD AUTO: 328 K/UL (ref 150–350)
PMV BLD AUTO: 10.2 FL (ref 9.2–12.9)
POTASSIUM SERPL-SCNC: 4.5 MMOL/L (ref 3.5–5.1)
PROT SERPL-MCNC: 7.6 G/DL (ref 6–8.4)
RBC # BLD AUTO: 4.85 M/UL (ref 4.6–6.2)
SODIUM SERPL-SCNC: 138 MMOL/L (ref 136–145)
TRIGL SERPL-MCNC: 102 MG/DL (ref 30–150)
TSH SERPL DL<=0.005 MIU/L-ACNC: 2.96 UIU/ML (ref 0.4–4)
WBC # BLD AUTO: 5.98 K/UL (ref 3.9–12.7)

## 2020-11-11 PROCEDURE — 80061 LIPID PANEL: CPT | Mod: HCNC

## 2020-11-11 PROCEDURE — 84443 ASSAY THYROID STIM HORMONE: CPT | Mod: HCNC

## 2020-11-11 PROCEDURE — 36415 COLL VENOUS BLD VENIPUNCTURE: CPT | Mod: HCNC,PO

## 2020-11-11 PROCEDURE — 84153 ASSAY OF PSA TOTAL: CPT | Mod: HCNC

## 2020-11-11 PROCEDURE — 86803 HEPATITIS C AB TEST: CPT | Mod: HCNC

## 2020-11-11 PROCEDURE — 85025 COMPLETE CBC W/AUTO DIFF WBC: CPT | Mod: HCNC

## 2020-11-11 PROCEDURE — 80053 COMPREHEN METABOLIC PANEL: CPT | Mod: HCNC

## 2020-11-11 PROCEDURE — 83036 HEMOGLOBIN GLYCOSYLATED A1C: CPT | Mod: HCNC

## 2020-11-12 LAB — HCV AB SERPL QL IA: NEGATIVE

## 2020-11-13 ENCOUNTER — PATIENT MESSAGE (OUTPATIENT)
Dept: FAMILY MEDICINE | Facility: CLINIC | Age: 65
End: 2020-11-13

## 2020-11-23 ENCOUNTER — PATIENT OUTREACH (OUTPATIENT)
Dept: ADMINISTRATIVE | Facility: HOSPITAL | Age: 65
End: 2020-11-23

## 2020-12-07 ENCOUNTER — OFFICE VISIT (OUTPATIENT)
Dept: FAMILY MEDICINE | Facility: CLINIC | Age: 65
End: 2020-12-07
Payer: MEDICARE

## 2020-12-07 VITALS
SYSTOLIC BLOOD PRESSURE: 130 MMHG | WEIGHT: 173.75 LBS | HEART RATE: 82 BPM | OXYGEN SATURATION: 98 % | HEIGHT: 70 IN | BODY MASS INDEX: 24.87 KG/M2 | TEMPERATURE: 98 F | DIASTOLIC BLOOD PRESSURE: 80 MMHG

## 2020-12-07 DIAGNOSIS — R73.9 HYPERGLYCEMIA: ICD-10-CM

## 2020-12-07 DIAGNOSIS — E78.5 HYPERLIPIDEMIA, UNSPECIFIED HYPERLIPIDEMIA TYPE: ICD-10-CM

## 2020-12-07 DIAGNOSIS — Z96.641 STATUS POST RIGHT HIP REPLACEMENT: ICD-10-CM

## 2020-12-07 DIAGNOSIS — Z12.5 SCREENING FOR PROSTATE CANCER: ICD-10-CM

## 2020-12-07 DIAGNOSIS — Z86.010 HISTORY OF COLONIC POLYPS: ICD-10-CM

## 2020-12-07 DIAGNOSIS — R73.03 PREDIABETES: ICD-10-CM

## 2020-12-07 DIAGNOSIS — I70.0 ATHEROSCLEROSIS OF AORTA: ICD-10-CM

## 2020-12-07 DIAGNOSIS — Z00.00 ROUTINE MEDICAL EXAM: Primary | ICD-10-CM

## 2020-12-07 PROCEDURE — 99499 RISK ADDL DX/OHS AUDIT: ICD-10-PCS | Mod: HCNC,S$GLB,, | Performed by: INTERNAL MEDICINE

## 2020-12-07 PROCEDURE — 3288F PR FALLS RISK ASSESSMENT DOCUMENTED: ICD-10-PCS | Mod: HCNC,CPTII,S$GLB, | Performed by: INTERNAL MEDICINE

## 2020-12-07 PROCEDURE — 99499 UNLISTED E&M SERVICE: CPT | Mod: HCNC,S$GLB,, | Performed by: INTERNAL MEDICINE

## 2020-12-07 PROCEDURE — 99999 PR PBB SHADOW E&M-EST. PATIENT-LVL III: CPT | Mod: PBBFAC,HCNC,, | Performed by: INTERNAL MEDICINE

## 2020-12-07 PROCEDURE — 99397 PER PM REEVAL EST PAT 65+ YR: CPT | Mod: HCNC,S$GLB,, | Performed by: INTERNAL MEDICINE

## 2020-12-07 PROCEDURE — 99397 PR PREVENTIVE VISIT,EST,65 & OVER: ICD-10-PCS | Mod: HCNC,S$GLB,, | Performed by: INTERNAL MEDICINE

## 2020-12-07 PROCEDURE — 3008F PR BODY MASS INDEX (BMI) DOCUMENTED: ICD-10-PCS | Mod: HCNC,CPTII,S$GLB, | Performed by: INTERNAL MEDICINE

## 2020-12-07 PROCEDURE — 1101F PT FALLS ASSESS-DOCD LE1/YR: CPT | Mod: HCNC,CPTII,S$GLB, | Performed by: INTERNAL MEDICINE

## 2020-12-07 PROCEDURE — 3288F FALL RISK ASSESSMENT DOCD: CPT | Mod: HCNC,CPTII,S$GLB, | Performed by: INTERNAL MEDICINE

## 2020-12-07 PROCEDURE — 1101F PR PT FALLS ASSESS DOC 0-1 FALLS W/OUT INJ PAST YR: ICD-10-PCS | Mod: HCNC,CPTII,S$GLB, | Performed by: INTERNAL MEDICINE

## 2020-12-07 PROCEDURE — 3008F BODY MASS INDEX DOCD: CPT | Mod: HCNC,CPTII,S$GLB, | Performed by: INTERNAL MEDICINE

## 2020-12-07 PROCEDURE — 99999 PR PBB SHADOW E&M-EST. PATIENT-LVL III: ICD-10-PCS | Mod: PBBFAC,HCNC,, | Performed by: INTERNAL MEDICINE

## 2020-12-07 RX ORDER — PRAVASTATIN SODIUM 40 MG/1
40 TABLET ORAL DAILY
Qty: 90 TABLET | Refills: 90 | Status: SHIPPED | OUTPATIENT
Start: 2020-12-07 | End: 2021-12-27

## 2020-12-07 NOTE — PROGRESS NOTES
Chief complaint Physical  -    Patient is a 65  -year-old white male Here for physical.  2 normal colonoscopies in the past the last being 2012 at the next in 7-10 years..  PSA is normal.      Apparently no further follow-up for his lung mass issue.  Reviewed interval pulmonary note.    He did go on a diet also fasting one day per week and lost about 12 lb.  He does donate blood and may have done so a few weeks prior to the lab work where it shows he had a slight hemoglobin reduction.  We discussed reassessment in three months or so rather than waiting the whole year.  He is up-to-date on his colon screening.    Patient also stopped his Lipitor an aspirin.  We did discuss his A1c above 6.8 and his prediabetic if not diabetes status for which a statin would be recommended so I would recommend he restart Pravachol and he request reducing the dose from 80 mg down to 40 and I think that is reasonable.  Perhaps he was on the 80 in the past due to compliance issues.  Looks like his highest LDL was in the 150s and we would have to assume that was prior to treatment.  We discussed possible metformin.  We discussed looking up a diabetic diet on the Internet to make further adjustments but he is already probably on a very good diet.  His A1c did improved to 6.4     patient also lately gets a little bit of heaviness and uncomfortable feeling in his lower chest and epigastric area when he lays flat on his back.  If he rolls over to his side it goes away.  It occurs after length for 20 or 30 min.  We discussed it may well be reflux and he can try antacids and some Zantac and so forth and assess response.      ID 3/18  Assessment:       1. 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 Fungal infection of lung    3. Lung mass    4. Lung nodules      Plan:       Continue fluconazole for another month - total 9 months  Continue Doxycycline until September  Repeat PFTs this summer.   Repeat CT in September.  Return in 6  months.      ROS:   CONST: weight stable. EYES: no vision change. ENT: no sore throat. CV: no chest pain w/ exertion. RESP: no shortness of breath. GI: no nausea, vomiting, diarrhea. No dysphagia. : no urinary issues. MUSCULOSKELETAL: no new myalgias or arthralgias. SKIN: no new changes. NEURO: no focal deficits. PSYCH: no new issues. ENDOCRINE: no polyuria. HEME: no lymph nodes. ALLERGY: no general pruritis.     PAST MEDICAL HISTORY:                                                        1.  Hyperlipidemia.                                                          2.  Back surgery for slipped disk, Dr. Sams.                            3.  History of ED, seen prior by Urology.                                    4.  History of suspicious stress test and atypical chest pain, normal cath  by Dr. Lugo, likely done at [facility].                            5.  Vasectomy.                                                               6.  Normal colonoscopy,  And , 7-10 years                                               7.  Erosive gastropathy, outside EGD along with a normal emptying study.     8.  Slight anemia in , but did donate blood prior.  9.  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 right hip replacement- Dr Thomas  12.  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                                                                                                    FAMILY HISTORY:  Father  of lung cancer and coronary disease at 73.      Sister  of lung cancer, mom  of breast cancer and she had diabetes.   A brother is apparently okay.                                                                                         "       Gen: no distress  EYES: conjunctiva clear, non-icteric, PERRL  ENT: nose clear, nasal mucosa normal, oropharynx clear and moist, teeth good  NECK:supple, thyroid non-palpable  RESP: effort is good, lungs clear  CV: heart RRR w/o murmur, gallops or rubs; no carotid bruits, no edema  MS: gait normal, no clubbing or cyanosis of the digits,   SKIN: No rashes, warm to touch      Dariusz Willis was seen today for annual exam.    Diagnoses and all orders for this visit:    Routine medical exam    Screening for prostate cancer, normal PSA    History of colonic polyps, up-to-date but follow slight hemoglobin reduction and obviously check appropriate testing if it worsens separate from blood donations    Prediabetes, improved diet and I we should check the A1c every three months    Hyperglycemia    Hyperlipidemia, unspecified hyperlipidemia type, restart Pravachol 40 mg    Status post right hip replacement    Atherosclerosis of aorta    Other orders  -     pravastatin (PRAVACHOL) 40 MG tablet; Take 1 tablet (40 mg total) by mouth once daily.     Clinical it will definitely be sensitive as he expresses significant high anxiety referable to the above issues.oooo"This note will not be shared with the patient."  "

## 2021-01-21 ENCOUNTER — PATIENT MESSAGE (OUTPATIENT)
Dept: FAMILY MEDICINE | Facility: CLINIC | Age: 66
End: 2021-01-21

## 2021-01-21 DIAGNOSIS — M25.569 KNEE PAIN, UNSPECIFIED CHRONICITY, UNSPECIFIED LATERALITY: Primary | ICD-10-CM

## 2021-01-22 ENCOUNTER — OFFICE VISIT (OUTPATIENT)
Dept: ORTHOPEDICS | Facility: CLINIC | Age: 66
End: 2021-01-22
Payer: MEDICARE

## 2021-01-22 ENCOUNTER — PATIENT OUTREACH (OUTPATIENT)
Dept: ADMINISTRATIVE | Facility: OTHER | Age: 66
End: 2021-01-22

## 2021-01-22 ENCOUNTER — HOSPITAL ENCOUNTER (OUTPATIENT)
Dept: RADIOLOGY | Facility: HOSPITAL | Age: 66
Discharge: HOME | End: 2021-01-22
Attending: ORTHOPAEDIC SURGERY
Payer: MEDICARE

## 2021-01-22 VITALS — HEIGHT: 70 IN | WEIGHT: 171.06 LBS | BODY MASS INDEX: 24.49 KG/M2

## 2021-01-22 DIAGNOSIS — M25.561 RIGHT KNEE PAIN, UNSPECIFIED CHRONICITY: Primary | ICD-10-CM

## 2021-01-22 DIAGNOSIS — M25.561 RIGHT KNEE PAIN, UNSPECIFIED CHRONICITY: ICD-10-CM

## 2021-01-22 DIAGNOSIS — M17.11 PRIMARY OSTEOARTHRITIS OF RIGHT KNEE: ICD-10-CM

## 2021-01-22 PROCEDURE — 1125F PR PAIN SEVERITY QUANTIFIED, PAIN PRESENT: ICD-10-PCS | Mod: HCNC,S$GLB,, | Performed by: ORTHOPAEDIC SURGERY

## 2021-01-22 PROCEDURE — 1125F AMNT PAIN NOTED PAIN PRSNT: CPT | Mod: HCNC,S$GLB,, | Performed by: ORTHOPAEDIC SURGERY

## 2021-01-22 PROCEDURE — 1159F MED LIST DOCD IN RCRD: CPT | Mod: HCNC,S$GLB,, | Performed by: ORTHOPAEDIC SURGERY

## 2021-01-22 PROCEDURE — 1101F PT FALLS ASSESS-DOCD LE1/YR: CPT | Mod: HCNC,CPTII,S$GLB, | Performed by: ORTHOPAEDIC SURGERY

## 2021-01-22 PROCEDURE — 1159F PR MEDICATION LIST DOCUMENTED IN MEDICAL RECORD: ICD-10-PCS | Mod: HCNC,S$GLB,, | Performed by: ORTHOPAEDIC SURGERY

## 2021-01-22 PROCEDURE — 3288F PR FALLS RISK ASSESSMENT DOCUMENTED: ICD-10-PCS | Mod: HCNC,CPTII,S$GLB, | Performed by: ORTHOPAEDIC SURGERY

## 2021-01-22 PROCEDURE — 1101F PR PT FALLS ASSESS DOC 0-1 FALLS W/OUT INJ PAST YR: ICD-10-PCS | Mod: HCNC,CPTII,S$GLB, | Performed by: ORTHOPAEDIC SURGERY

## 2021-01-22 PROCEDURE — 3008F PR BODY MASS INDEX (BMI) DOCUMENTED: ICD-10-PCS | Mod: HCNC,CPTII,S$GLB, | Performed by: ORTHOPAEDIC SURGERY

## 2021-01-22 PROCEDURE — 99203 OFFICE O/P NEW LOW 30 MIN: CPT | Mod: HCNC,S$GLB,, | Performed by: ORTHOPAEDIC SURGERY

## 2021-01-22 PROCEDURE — 99203 PR OFFICE/OUTPT VISIT, NEW, LEVL III, 30-44 MIN: ICD-10-PCS | Mod: HCNC,S$GLB,, | Performed by: ORTHOPAEDIC SURGERY

## 2021-01-22 PROCEDURE — 99999 PR PBB SHADOW E&M-EST. PATIENT-LVL III: ICD-10-PCS | Mod: PBBFAC,HCNC,, | Performed by: ORTHOPAEDIC SURGERY

## 2021-01-22 PROCEDURE — 99999 PR PBB SHADOW E&M-EST. PATIENT-LVL III: CPT | Mod: PBBFAC,HCNC,, | Performed by: ORTHOPAEDIC SURGERY

## 2021-01-22 PROCEDURE — 73562 XR KNEE ORTHO RIGHT: ICD-10-PCS | Mod: 26,HCNC,RT, | Performed by: RADIOLOGY

## 2021-01-22 PROCEDURE — 73560 X-RAY EXAM OF KNEE 1 OR 2: CPT | Mod: TC,HCNC,LT

## 2021-01-22 PROCEDURE — 3008F BODY MASS INDEX DOCD: CPT | Mod: HCNC,CPTII,S$GLB, | Performed by: ORTHOPAEDIC SURGERY

## 2021-01-22 PROCEDURE — 3288F FALL RISK ASSESSMENT DOCD: CPT | Mod: HCNC,CPTII,S$GLB, | Performed by: ORTHOPAEDIC SURGERY

## 2021-01-22 PROCEDURE — 73562 X-RAY EXAM OF KNEE 3: CPT | Mod: 26,HCNC,RT, | Performed by: RADIOLOGY

## 2021-02-17 ENCOUNTER — PATIENT MESSAGE (OUTPATIENT)
Dept: ADMINISTRATIVE | Facility: CLINIC | Age: 66
End: 2021-02-17

## 2021-07-29 ENCOUNTER — PATIENT MESSAGE (OUTPATIENT)
Dept: FAMILY MEDICINE | Facility: CLINIC | Age: 66
End: 2021-07-29

## 2021-08-06 RX ORDER — VALSARTAN 80 MG/1
80 TABLET ORAL DAILY
Qty: 90 TABLET | Refills: 3 | Status: SHIPPED | OUTPATIENT
Start: 2021-08-06 | End: 2022-07-05 | Stop reason: SDUPTHER

## 2021-08-09 ENCOUNTER — PATIENT MESSAGE (OUTPATIENT)
Dept: FAMILY MEDICINE | Facility: CLINIC | Age: 66
End: 2021-08-09

## 2021-08-22 ENCOUNTER — HOSPITAL ENCOUNTER (EMERGENCY)
Facility: HOSPITAL | Age: 66
Discharge: HOME | End: 2021-08-22
Attending: EMERGENCY MEDICINE
Payer: MEDICARE

## 2021-08-22 VITALS
WEIGHT: 167 LBS | HEART RATE: 74 BPM | RESPIRATION RATE: 17 BRPM | DIASTOLIC BLOOD PRESSURE: 65 MMHG | TEMPERATURE: 98 F | BODY MASS INDEX: 23.91 KG/M2 | SYSTOLIC BLOOD PRESSURE: 133 MMHG | HEIGHT: 70 IN | OXYGEN SATURATION: 99 %

## 2021-08-22 DIAGNOSIS — R07.9 CHEST PAIN: ICD-10-CM

## 2021-08-22 LAB
ALBUMIN SERPL-MCNC: 3.4 G/DL (ref 3.3–5.5)
ALP SERPL-CCNC: 60 U/L (ref 42–141)
BILIRUB SERPL-MCNC: 0.5 MG/DL (ref 0.2–1.6)
BUN SERPL-MCNC: 14 MG/DL (ref 7–22)
CALCIUM SERPL-MCNC: 9.8 MG/DL (ref 8–10.3)
CHLORIDE SERPL-SCNC: 104 MMOL/L (ref 98–108)
CREAT SERPL-MCNC: 0.9 MG/DL (ref 0.6–1.2)
GLUCOSE SERPL-MCNC: 121 MG/DL (ref 73–118)
POC ALT (SGPT): 21 U/L (ref 10–47)
POC AST (SGOT): 28 U/L (ref 11–38)
POC B-TYPE NATRIURETIC PEPTIDE: 26.3 PG/ML (ref 0–100)
POC CARDIAC TROPONIN I: 0 NG/ML
POC CARDIAC TROPONIN I: 0 NG/ML
POC D-DI: 533 NG/ML (ref 0–450)
POC TCO2: 29 MMOL/L (ref 18–33)
POTASSIUM BLD-SCNC: 4.3 MMOL/L (ref 3.6–5.1)
PROTEIN, POC: 7.3 G/DL (ref 6.4–8.1)
SAMPLE: NORMAL
SAMPLE: NORMAL
SODIUM BLD-SCNC: 142 MMOL/L (ref 128–145)

## 2021-08-22 PROCEDURE — 25000003 PHARM REV CODE 250: Mod: ER | Performed by: EMERGENCY MEDICINE

## 2021-08-22 PROCEDURE — 25500020 PHARM REV CODE 255: Mod: ER | Performed by: EMERGENCY MEDICINE

## 2021-08-22 PROCEDURE — 85025 COMPLETE CBC W/AUTO DIFF WBC: CPT | Mod: ER

## 2021-08-22 PROCEDURE — 25000242 PHARM REV CODE 250 ALT 637 W/ HCPCS: Mod: ER | Performed by: EMERGENCY MEDICINE

## 2021-08-22 PROCEDURE — 85379 FIBRIN DEGRADATION QUANT: CPT | Mod: ER

## 2021-08-22 PROCEDURE — 99285 EMERGENCY DEPT VISIT HI MDM: CPT | Mod: 25,ER

## 2021-08-22 PROCEDURE — 83880 ASSAY OF NATRIURETIC PEPTIDE: CPT | Mod: ER

## 2021-08-22 PROCEDURE — 80053 COMPREHEN METABOLIC PANEL: CPT | Mod: ER

## 2021-08-22 PROCEDURE — 84484 ASSAY OF TROPONIN QUANT: CPT | Mod: ER

## 2021-08-22 RX ORDER — ASPIRIN 325 MG
325 TABLET ORAL
Status: DISCONTINUED | OUTPATIENT
Start: 2021-08-22 | End: 2021-08-22

## 2021-08-22 RX ORDER — NAPROXEN SODIUM 220 MG/1
162 TABLET, FILM COATED ORAL
Status: COMPLETED | OUTPATIENT
Start: 2021-08-22 | End: 2021-08-22

## 2021-08-22 RX ORDER — ALBUTEROL SULFATE 90 UG/1
6 AEROSOL, METERED RESPIRATORY (INHALATION) ONCE
Status: COMPLETED | OUTPATIENT
Start: 2021-08-22 | End: 2021-08-22

## 2021-08-22 RX ORDER — FAMOTIDINE 20 MG/1
20 TABLET, FILM COATED ORAL 2 TIMES DAILY
Qty: 28 TABLET | Refills: 0 | Status: SHIPPED | OUTPATIENT
Start: 2021-08-22 | End: 2022-07-11 | Stop reason: SDUPTHER

## 2021-08-22 RX ADMIN — ALBUTEROL SULFATE 6 PUFF: 90 AEROSOL, METERED RESPIRATORY (INHALATION) at 11:08

## 2021-08-22 RX ADMIN — ASPIRIN 81 MG CHEWABLE TABLET 162 MG: 81 TABLET CHEWABLE at 09:08

## 2021-08-22 RX ADMIN — IOHEXOL 100 ML: 350 INJECTION, SOLUTION INTRAVENOUS at 11:08

## 2021-08-22 RX ADMIN — MAGNESIUM HYDROXIDE/ALUMINUM HYDROXICE/SIMETHICONE: 120; 1200; 1200 SUSPENSION ORAL at 09:08

## 2021-08-26 ENCOUNTER — PES CALL (OUTPATIENT)
Dept: ADMINISTRATIVE | Facility: CLINIC | Age: 66
End: 2021-08-26

## 2021-09-17 ENCOUNTER — PATIENT OUTREACH (OUTPATIENT)
Dept: ADMINISTRATIVE | Facility: HOSPITAL | Age: 66
End: 2021-09-17

## 2021-11-03 ENCOUNTER — OFFICE VISIT (OUTPATIENT)
Dept: CARDIOLOGY | Facility: CLINIC | Age: 66
End: 2021-11-03
Payer: MEDICARE

## 2021-11-03 VITALS
BODY MASS INDEX: 23.84 KG/M2 | HEIGHT: 70 IN | WEIGHT: 166.56 LBS | DIASTOLIC BLOOD PRESSURE: 82 MMHG | HEART RATE: 87 BPM | SYSTOLIC BLOOD PRESSURE: 141 MMHG

## 2021-11-03 DIAGNOSIS — B49 FUNGAL INFECTION OF LUNG: ICD-10-CM

## 2021-11-03 DIAGNOSIS — R06.02 SHORTNESS OF BREATH: ICD-10-CM

## 2021-11-03 DIAGNOSIS — E78.49 OTHER HYPERLIPIDEMIA: ICD-10-CM

## 2021-11-03 DIAGNOSIS — R94.31 EKG ABNORMALITY: ICD-10-CM

## 2021-11-03 DIAGNOSIS — R07.1 CHEST PAIN ON BREATHING: ICD-10-CM

## 2021-11-03 DIAGNOSIS — R93.89 ABNORMAL CT OF THE CHEST: ICD-10-CM

## 2021-11-03 DIAGNOSIS — I10 PRIMARY HYPERTENSION: Primary | ICD-10-CM

## 2021-11-03 DIAGNOSIS — Z82.49 FAMILY HISTORY OF CORONARY ARTERY DISEASE: ICD-10-CM

## 2021-11-03 PROCEDURE — 3077F PR MOST RECENT SYSTOLIC BLOOD PRESSURE >= 140 MM HG: ICD-10-PCS | Mod: HCNC,CPTII,S$GLB, | Performed by: INTERNAL MEDICINE

## 2021-11-03 PROCEDURE — 3288F PR FALLS RISK ASSESSMENT DOCUMENTED: ICD-10-PCS | Mod: HCNC,CPTII,S$GLB, | Performed by: INTERNAL MEDICINE

## 2021-11-03 PROCEDURE — 99499 UNLISTED E&M SERVICE: CPT | Mod: HCNC,S$GLB,, | Performed by: INTERNAL MEDICINE

## 2021-11-03 PROCEDURE — 1101F PT FALLS ASSESS-DOCD LE1/YR: CPT | Mod: HCNC,CPTII,S$GLB, | Performed by: INTERNAL MEDICINE

## 2021-11-03 PROCEDURE — 3008F PR BODY MASS INDEX (BMI) DOCUMENTED: ICD-10-PCS | Mod: HCNC,CPTII,S$GLB, | Performed by: INTERNAL MEDICINE

## 2021-11-03 PROCEDURE — 99999 PR PBB SHADOW E&M-EST. PATIENT-LVL III: ICD-10-PCS | Mod: PBBFAC,HCNC,, | Performed by: INTERNAL MEDICINE

## 2021-11-03 PROCEDURE — 1101F PR PT FALLS ASSESS DOC 0-1 FALLS W/OUT INJ PAST YR: ICD-10-PCS | Mod: HCNC,CPTII,S$GLB, | Performed by: INTERNAL MEDICINE

## 2021-11-03 PROCEDURE — 3079F DIAST BP 80-89 MM HG: CPT | Mod: HCNC,CPTII,S$GLB, | Performed by: INTERNAL MEDICINE

## 2021-11-03 PROCEDURE — 4010F PR ACE/ARB THEARPY RXD/TAKEN: ICD-10-PCS | Mod: HCNC,CPTII,S$GLB, | Performed by: INTERNAL MEDICINE

## 2021-11-03 PROCEDURE — 3008F BODY MASS INDEX DOCD: CPT | Mod: HCNC,CPTII,S$GLB, | Performed by: INTERNAL MEDICINE

## 2021-11-03 PROCEDURE — 99204 OFFICE O/P NEW MOD 45 MIN: CPT | Mod: HCNC,S$GLB,, | Performed by: INTERNAL MEDICINE

## 2021-11-03 PROCEDURE — 99499 RISK ADDL DX/OHS AUDIT: ICD-10-PCS | Mod: HCNC,S$GLB,, | Performed by: INTERNAL MEDICINE

## 2021-11-03 PROCEDURE — 99999 PR PBB SHADOW E&M-EST. PATIENT-LVL III: CPT | Mod: PBBFAC,HCNC,, | Performed by: INTERNAL MEDICINE

## 2021-11-03 PROCEDURE — 99204 PR OFFICE/OUTPT VISIT, NEW, LEVL IV, 45-59 MIN: ICD-10-PCS | Mod: HCNC,S$GLB,, | Performed by: INTERNAL MEDICINE

## 2021-11-03 PROCEDURE — 1159F MED LIST DOCD IN RCRD: CPT | Mod: HCNC,CPTII,S$GLB, | Performed by: INTERNAL MEDICINE

## 2021-11-03 PROCEDURE — 3288F FALL RISK ASSESSMENT DOCD: CPT | Mod: HCNC,CPTII,S$GLB, | Performed by: INTERNAL MEDICINE

## 2021-11-03 PROCEDURE — 3079F PR MOST RECENT DIASTOLIC BLOOD PRESSURE 80-89 MM HG: ICD-10-PCS | Mod: HCNC,CPTII,S$GLB, | Performed by: INTERNAL MEDICINE

## 2021-11-03 PROCEDURE — 3077F SYST BP >= 140 MM HG: CPT | Mod: HCNC,CPTII,S$GLB, | Performed by: INTERNAL MEDICINE

## 2021-11-03 PROCEDURE — 1159F PR MEDICATION LIST DOCUMENTED IN MEDICAL RECORD: ICD-10-PCS | Mod: HCNC,CPTII,S$GLB, | Performed by: INTERNAL MEDICINE

## 2021-11-03 PROCEDURE — 4010F ACE/ARB THERAPY RXD/TAKEN: CPT | Mod: HCNC,CPTII,S$GLB, | Performed by: INTERNAL MEDICINE

## 2021-11-30 ENCOUNTER — PATIENT OUTREACH (OUTPATIENT)
Dept: ADMINISTRATIVE | Facility: HOSPITAL | Age: 66
End: 2021-11-30
Payer: MEDICARE

## 2021-12-06 ENCOUNTER — HOSPITAL ENCOUNTER (OUTPATIENT)
Dept: CARDIOLOGY | Facility: HOSPITAL | Age: 66
Discharge: HOME | End: 2021-12-06
Attending: INTERNAL MEDICINE
Payer: MEDICARE

## 2021-12-06 DIAGNOSIS — I10 PRIMARY HYPERTENSION: ICD-10-CM

## 2021-12-06 DIAGNOSIS — E78.49 OTHER HYPERLIPIDEMIA: ICD-10-CM

## 2021-12-06 DIAGNOSIS — Z82.49 FAMILY HISTORY OF CORONARY ARTERY DISEASE: ICD-10-CM

## 2021-12-06 DIAGNOSIS — R07.1 CHEST PAIN ON BREATHING: ICD-10-CM

## 2021-12-06 LAB
AORTIC ROOT ANNULUS: 2.58 CM
AORTIC VALVE CUSP SEPERATION: 2 CM
ASCENDING AORTA: 2.61 CM
AV INDEX (PROSTH): 0.99
AV MEAN GRADIENT: 4 MMHG
AV PEAK GRADIENT: 7 MMHG
AV VALVE AREA: 2.68 CM2
AV VELOCITY RATIO: 1.14
CV ECHO LV RWT: 0.52 CM
DOP CALC AO PEAK VEL: 1.34 M/S
DOP CALC AO VTI: 29.91 CM
DOP CALC LVOT AREA: 2.7 CM2
DOP CALC LVOT DIAMETER: 1.86 CM
DOP CALC LVOT PEAK VEL: 1.53 M/S
DOP CALC LVOT STROKE VOLUME: 80.2 CM3
DOP CALCLVOT PEAK VEL VTI: 29.53 CM
E WAVE DECELERATION TIME: 275.26 MSEC
E/A RATIO: 0.77
E/E' RATIO: 11.85 M/S
ECHO LV POSTERIOR WALL: 1.13 CM (ref 0.6–1.1)
EJECTION FRACTION: 65 %
FRACTIONAL SHORTENING: 40 % (ref 28–44)
INTERVENTRICULAR SEPTUM: 1.03 CM (ref 0.6–1.1)
IVRT: 93.43 MSEC
LA MAJOR: 4.97 CM
LA MINOR: 4.82 CM
LA WIDTH: 3.69 CM
LEFT ATRIUM SIZE: 4.48 CM
LEFT ATRIUM VOLUME: 68.77 CM3
LEFT INTERNAL DIMENSION IN SYSTOLE: 2.58 CM (ref 2.1–4)
LEFT VENTRICLE DIASTOLIC VOLUME: 83.5 ML
LEFT VENTRICLE SYSTOLIC VOLUME: 24.03 ML
LEFT VENTRICULAR INTERNAL DIMENSION IN DIASTOLE: 4.31 CM (ref 3.5–6)
LEFT VENTRICULAR MASS: 159.33 G
LV LATERAL E/E' RATIO: 11 M/S
LV SEPTAL E/E' RATIO: 12.83 M/S
MV A" WAVE DURATION": 7.96 MSEC
MV PEAK A VEL: 1 M/S
MV PEAK E VEL: 0.77 M/S
MV STENOSIS PRESSURE HALF TIME: 72.24 MS
MV VALVE AREA P 1/2 METHOD: 3.05 CM2
PISA MRMAX VEL: 0.06 M/S
PISA TR MAX VEL: 2.61 M/S
PULM VEIN S/D RATIO: 1.36
PV PEAK D VEL: 0.56 M/S
PV PEAK S VEL: 0.76 M/S
PV PEAK VELOCITY: 1.51 CM/S
RA MAJOR: 4.34 CM
RA PRESSURE: 3 MMHG
RA WIDTH: 3.96 CM
RIGHT VENTRICULAR END-DIASTOLIC DIMENSION: 3.15 CM
RV TISSUE DOPPLER FREE WALL SYSTOLIC VELOCITY 1 (APICAL 4 CHAMBER VIEW): 11.68 CM/S
SINUS: 2.81 CM
STJ: 2.32 CM
TDI LATERAL: 0.07 M/S
TDI SEPTAL: 0.06 M/S
TDI: 0.07 M/S
TR MAX PG: 27 MMHG
TRICUSPID ANNULAR PLANE SYSTOLIC EXCURSION: 2.29 CM
TV REST PULMONARY ARTERY PRESSURE: 30 MMHG

## 2021-12-06 PROCEDURE — 93306 TTE W/DOPPLER COMPLETE: CPT | Mod: HCNC

## 2021-12-06 PROCEDURE — 93306 ECHO (CUPID ONLY): ICD-10-PCS | Mod: 26,HCNC,, | Performed by: INTERNAL MEDICINE

## 2021-12-06 PROCEDURE — 93306 TTE W/DOPPLER COMPLETE: CPT | Mod: 26,HCNC,, | Performed by: INTERNAL MEDICINE

## 2021-12-15 ENCOUNTER — PATIENT MESSAGE (OUTPATIENT)
Dept: ADMINISTRATIVE | Facility: HOSPITAL | Age: 66
End: 2021-12-15
Payer: MEDICARE

## 2021-12-23 ENCOUNTER — TELEPHONE (OUTPATIENT)
Dept: FAMILY MEDICINE | Facility: CLINIC | Age: 66
End: 2021-12-23
Payer: MEDICARE

## 2021-12-23 ENCOUNTER — PATIENT OUTREACH (OUTPATIENT)
Dept: ADMINISTRATIVE | Facility: HOSPITAL | Age: 66
End: 2021-12-23
Payer: MEDICARE

## 2021-12-29 ENCOUNTER — PATIENT MESSAGE (OUTPATIENT)
Dept: FAMILY MEDICINE | Facility: CLINIC | Age: 66
End: 2021-12-29
Payer: MEDICARE

## 2021-12-29 DIAGNOSIS — U07.1 COVID-19: Primary | ICD-10-CM

## 2021-12-30 ENCOUNTER — NURSE TRIAGE (OUTPATIENT)
Dept: ADMINISTRATIVE | Facility: CLINIC | Age: 66
End: 2021-12-30
Payer: MEDICARE

## 2022-01-04 ENCOUNTER — PATIENT OUTREACH (OUTPATIENT)
Dept: ADMINISTRATIVE | Facility: OTHER | Age: 67
End: 2022-01-04
Payer: MEDICARE

## 2022-01-07 ENCOUNTER — OFFICE VISIT (OUTPATIENT)
Dept: CARDIOLOGY | Facility: CLINIC | Age: 67
End: 2022-01-07
Payer: MEDICARE

## 2022-01-07 VITALS
SYSTOLIC BLOOD PRESSURE: 164 MMHG | WEIGHT: 170.63 LBS | DIASTOLIC BLOOD PRESSURE: 74 MMHG | HEART RATE: 68 BPM | BODY MASS INDEX: 24.43 KG/M2 | HEIGHT: 70 IN | OXYGEN SATURATION: 96 %

## 2022-01-07 DIAGNOSIS — E78.49 OTHER HYPERLIPIDEMIA: ICD-10-CM

## 2022-01-07 DIAGNOSIS — Z82.49 FAMILY HISTORY OF CORONARY ARTERY DISEASE: ICD-10-CM

## 2022-01-07 DIAGNOSIS — I10 PRIMARY HYPERTENSION: Primary | ICD-10-CM

## 2022-01-07 DIAGNOSIS — R93.89 ABNORMAL CT OF THE CHEST: ICD-10-CM

## 2022-01-07 PROCEDURE — 3288F PR FALLS RISK ASSESSMENT DOCUMENTED: ICD-10-PCS | Mod: HCNC,CPTII,S$GLB, | Performed by: INTERNAL MEDICINE

## 2022-01-07 PROCEDURE — 3078F DIAST BP <80 MM HG: CPT | Mod: HCNC,CPTII,S$GLB, | Performed by: INTERNAL MEDICINE

## 2022-01-07 PROCEDURE — 3008F BODY MASS INDEX DOCD: CPT | Mod: HCNC,CPTII,S$GLB, | Performed by: INTERNAL MEDICINE

## 2022-01-07 PROCEDURE — 99214 OFFICE O/P EST MOD 30 MIN: CPT | Mod: HCNC,S$GLB,, | Performed by: INTERNAL MEDICINE

## 2022-01-07 PROCEDURE — 1159F MED LIST DOCD IN RCRD: CPT | Mod: HCNC,CPTII,S$GLB, | Performed by: INTERNAL MEDICINE

## 2022-01-07 PROCEDURE — 1126F PR PAIN SEVERITY QUANTIFIED, NO PAIN PRESENT: ICD-10-PCS | Mod: HCNC,CPTII,S$GLB, | Performed by: INTERNAL MEDICINE

## 2022-01-07 PROCEDURE — 1160F RVW MEDS BY RX/DR IN RCRD: CPT | Mod: HCNC,CPTII,S$GLB, | Performed by: INTERNAL MEDICINE

## 2022-01-07 PROCEDURE — 3077F PR MOST RECENT SYSTOLIC BLOOD PRESSURE >= 140 MM HG: ICD-10-PCS | Mod: HCNC,CPTII,S$GLB, | Performed by: INTERNAL MEDICINE

## 2022-01-07 PROCEDURE — 3288F FALL RISK ASSESSMENT DOCD: CPT | Mod: HCNC,CPTII,S$GLB, | Performed by: INTERNAL MEDICINE

## 2022-01-07 PROCEDURE — 3008F PR BODY MASS INDEX (BMI) DOCUMENTED: ICD-10-PCS | Mod: HCNC,CPTII,S$GLB, | Performed by: INTERNAL MEDICINE

## 2022-01-07 PROCEDURE — 99499 UNLISTED E&M SERVICE: CPT | Mod: S$GLB,,, | Performed by: INTERNAL MEDICINE

## 2022-01-07 PROCEDURE — 1159F PR MEDICATION LIST DOCUMENTED IN MEDICAL RECORD: ICD-10-PCS | Mod: HCNC,CPTII,S$GLB, | Performed by: INTERNAL MEDICINE

## 2022-01-07 PROCEDURE — 99999 PR PBB SHADOW E&M-EST. PATIENT-LVL III: CPT | Mod: PBBFAC,HCNC,, | Performed by: INTERNAL MEDICINE

## 2022-01-07 PROCEDURE — 99214 PR OFFICE/OUTPT VISIT, EST, LEVL IV, 30-39 MIN: ICD-10-PCS | Mod: HCNC,S$GLB,, | Performed by: INTERNAL MEDICINE

## 2022-01-07 PROCEDURE — 1101F PT FALLS ASSESS-DOCD LE1/YR: CPT | Mod: HCNC,CPTII,S$GLB, | Performed by: INTERNAL MEDICINE

## 2022-01-07 PROCEDURE — 1160F PR REVIEW ALL MEDS BY PRESCRIBER/CLIN PHARMACIST DOCUMENTED: ICD-10-PCS | Mod: HCNC,CPTII,S$GLB, | Performed by: INTERNAL MEDICINE

## 2022-01-07 PROCEDURE — 1126F AMNT PAIN NOTED NONE PRSNT: CPT | Mod: HCNC,CPTII,S$GLB, | Performed by: INTERNAL MEDICINE

## 2022-01-07 PROCEDURE — 99499 RISK ADDL DX/OHS AUDIT: ICD-10-PCS | Mod: S$GLB,,, | Performed by: INTERNAL MEDICINE

## 2022-01-07 PROCEDURE — 1101F PR PT FALLS ASSESS DOC 0-1 FALLS W/OUT INJ PAST YR: ICD-10-PCS | Mod: HCNC,CPTII,S$GLB, | Performed by: INTERNAL MEDICINE

## 2022-01-07 PROCEDURE — 3077F SYST BP >= 140 MM HG: CPT | Mod: HCNC,CPTII,S$GLB, | Performed by: INTERNAL MEDICINE

## 2022-01-07 PROCEDURE — 99999 PR PBB SHADOW E&M-EST. PATIENT-LVL III: ICD-10-PCS | Mod: PBBFAC,HCNC,, | Performed by: INTERNAL MEDICINE

## 2022-01-07 PROCEDURE — 3078F PR MOST RECENT DIASTOLIC BLOOD PRESSURE < 80 MM HG: ICD-10-PCS | Mod: HCNC,CPTII,S$GLB, | Performed by: INTERNAL MEDICINE

## 2022-01-07 NOTE — PROGRESS NOTES
CARDIOLOGY CLINIC VISIT        HISTORY OF PRESENT ILLNESS:     Dariusz Urbina presents for continued care.  Seen 11/03/2021 for evaluation of hypertension.  In August 2021 he had an episode of chest discomfort.  Denied any repeat episodes.  Echocardiogram showed normal left ventricular systolic function with estimated ejection fraction of 65%.  Mild mitral and tricuspid regurgitation.  Mildly elevated pulmonary pressure.  He had presented to the emergency room on 08/22/2021 with complaints of chest tightness.  Worse with deep inspiration.  Blood pressure was 169/79.  EKG showed normal sinus rhythm.  Inferolateral ST abnormality.  Similar to EKG from 2019.  CTA was negative.  Prior negative stress test in 2019 in 2017. Cardiac calcium score from 2015 was 43. He is without complaints today.  Did have COVID a few weeks ago.  Blood pressure is little elevated today.  He states that it is better at home.    CARDIOVASCULAR HISTORY:     Coronary calcium score of 43    PAST MEDICAL HISTORY:     Past Medical History:   Diagnosis Date    Decreased libido 11/11/2013    Dermatitis 10/12/2012    Hyperlipidemia 11/11/2013       PAST SURGICAL HISTORY:     Past Surgical History:   Procedure Laterality Date    COLONOSCOPY      HIP REPLACEMENT ARTHROPLASTY  03/2019    LUMBAR FUSION      titanium rods    TONSILLECTOMY         ALLERGIES AND MEDICATION:   Review of patient's allergies indicates:  No Known Allergies     Medication List          Accurate as of January 7, 2022  1:20 PM. If you have any questions, ask your nurse or doctor.            CONTINUE taking these medications    ascorbic acid (vitamin C) 1000 MG tablet  Commonly known as: VITAMIN C     cyanocobalamin 1000 MCG tablet  Commonly known as: VITAMIN B-12     famotidine 20 MG tablet  Commonly known as: PEPCID  Take 1 tablet (20 mg total) by mouth 2 (two) times daily. for 14 days     pravastatin 40 MG tablet  Commonly known as: PRAVACHOL  TAKE 1 TABLET(40 MG) BY MOUTH  EVERY DAY     valsartan 80 MG tablet  Commonly known as: DIOVAN  Take 1 tablet (80 mg total) by mouth once daily.     vitamin D 1000 units Tab  Commonly known as: VITAMIN D3        STOP taking these medications    aspirin 81 MG Chew  Stopped by: Kaiden Barber MD            SOCIAL HISTORY:     Social History     Socioeconomic History    Marital status:    Tobacco Use    Smoking status: Former Smoker     Packs/day: 1.00     Years: 10.00     Pack years: 10.00     Quit date: 1989     Years since quittin.5    Smokeless tobacco: Never Used   Substance and Sexual Activity    Alcohol use: No    Drug use: No     Social Determinants of Health     Financial Resource Strain: Low Risk     Difficulty of Paying Living Expenses: Not hard at all   Food Insecurity: No Food Insecurity    Worried About Running Out of Food in the Last Year: Never true    Ran Out of Food in the Last Year: Never true   Transportation Needs: No Transportation Needs    Lack of Transportation (Medical): No    Lack of Transportation (Non-Medical): No   Physical Activity: Insufficiently Active    Days of Exercise per Week: 3 days    Minutes of Exercise per Session: 40 min   Stress: No Stress Concern Present    Feeling of Stress : Not at all   Social Connections: Unknown    Frequency of Communication with Friends and Family: More than three times a week    Frequency of Social Gatherings with Friends and Family: Three times a week    Active Member of Clubs or Organizations: Yes    Attends Club or Organization Meetings: More than 4 times per year    Marital Status:    Housing Stability: Low Risk     Unable to Pay for Housing in the Last Year: No    Number of Places Lived in the Last Year: 1    Unstable Housing in the Last Year: No       FAMILY HISTORY:   History reviewed. No pertinent family history.    REVIEW OF SYSTEMS:   Review of Systems   Respiratory: Negative for sputum production, shortness of breath and  "wheezing.    Cardiovascular: Negative for chest pain, palpitations, orthopnea, claudication, leg swelling and PND.   Gastrointestinal: Negative for abdominal pain, heartburn, nausea and vomiting.   Neurological: Negative for dizziness, speech change, focal weakness, loss of consciousness, weakness and headaches.       PHYSICAL EXAM:     Vitals:    01/07/22 1309   BP: (!) 164/74   Pulse: 68    Body mass index is 24.48 kg/m².  Weight: 77.4 kg (170 lb 10.2 oz)   Height: 5' 10" (177.8 cm)     Physical Exam  Vitals reviewed.   Constitutional:       General: He is not in acute distress.     Appearance: He is well-developed. He is not diaphoretic.   Neck:      Vascular: No carotid bruit or JVD.   Cardiovascular:      Rate and Rhythm: Normal rate and regular rhythm.      Pulses: Normal pulses.      Heart sounds: Normal heart sounds.   Pulmonary:      Effort: Pulmonary effort is normal.      Breath sounds: Normal breath sounds.   Abdominal:      General: Bowel sounds are normal.      Palpations: Abdomen is soft.      Tenderness: There is no abdominal tenderness.   Musculoskeletal:      Right lower leg: No edema.      Left lower leg: No edema.   Neurological:      Mental Status: He is alert and oriented to person, place, and time.   Psychiatric:         Speech: Speech normal.         Behavior: Behavior normal.         DATA:   EKG: (personally reviewed tracing)    Laboratory:  CBC:  Recent Labs   Lab 05/02/19 0843 11/11/20  0833   WBC 5.77 5.98   Hemoglobin 14.8 13.1 L   Hematocrit 43.8 41.7   Platelets 252 328       CHEMISTRIES:  Recent Labs   Lab 05/02/19 0843 11/11/20  0833   Glucose 115 H 126 H   Sodium 140 138   Potassium 5.1 4.5   BUN 17 14   Creatinine 1.0 1.0   eGFR if African American >60.0 >60.0   eGFR if non African American >60.0 >60.0   Calcium 9.7 9.1       CARDIAC BIOMARKERS:        COAGS:  Recent Labs   Lab 05/02/19  0843   INR 1.0       LIPIDS/LFTS:  Recent Labs   Lab 05/02/19  0843 11/11/20  0833 " 12/06/21  0843   Cholesterol  --  205 H 168   Triglycerides  --  102 165 H   HDL  --  46 37 L   LDL Cholesterol  --  138.6 98.0   Non-HDL Cholesterol  --  159 131   AST 27 19  --    ALT 32 14  --        Cardiovascular Testing:    Echocardiogram 12/06/2021:    · The left ventricle is normal in size with concentric hypertrophy and normal systolic function.  · The estimated ejection fraction is 65%.  · Normal left ventricular diastolic function.  · Normal right ventricular size with normal right ventricular systolic function.  · Mild left atrial enlargement.  · Mild mitral regurgitation.  · Mild tricuspid regurgitation.  · Normal central venous pressure (3 mmHg).  · The estimated PA systolic pressure is 30 mmHg.    KIAN 5/3/19:     · The patient exercised for 8 minutes and 17 seconds on a Jorge protocol, corresponding to a functional capacity of 10 METS, achieving a peak heart rate of 139 bpm, which is 89% of the age predicted maximum heart rate  · Stress echo is negative for ischemia. No wall motion abnormality seen at rest or post stress  · Normal left ventricular systolic function. The estimated ejection fraction is 65%  · The EKG portion of this study is negative for myocardial ischemia.  · The patient reported no symptoms during the stress test.  · Normal LV diastolic function.  · Normal right ventricular systolic function.  · Mild left atrial enlargement.  · Mild mitral regurgitation.  · Normal central venous pressure (3 mm Hg).  · The estimated PA systolic pressure is 33 mm Hg  · There were no arrhythmias during stress.     Nuclear stress 4/24/17:     EKG Conclusions:     1. The EKG portion of this study is uninterpretable for ischemia at a high workload, and peak heart rate of 141 bpm (89% of predicted).   2. Blood pressure remained stable throughout the protocol  (Presenting BP: 152/88 Peak BP: 159/58).   3. No significant arrhythmias were present.   4. There were no symptoms of chest discomfort or significant  dyspnea throughout the protocol.      Impression: NORMAL MYOCARDIAL PERFUSION   1. The perfusion scan is free of evidence for myocardial ischemia or injury.   2. Resting wall motion is physiologic.   3. Resting LV function is normal.   4. The ventricular volumes are normal at rest and stress.   5. The extracardiac distribution of radioactivity is normal.     Cardiac CT score 12/4/15:     Agatston Score:     43.     Findings:     The thoracic aorta maintains a normal caliber, contour, and course.  The pulmonary arteries distribute normally .  There is no pleural or pericardial fluid.  Calcification of the mitral annulus is noted, a senescent finding.     There is no obvious mediastinal or axillary lymphadenopathy.  The hilar contours are unremarkable.     The esophagus maintains a normal caliber and course.     Limited images of the upper abdomen reveal no significant abnormality.     The visualized lungs are well aerated and demonstrate no convincing evidence of consolidation, pneumothorax, mass, or significant pleural thickening.  A calcified granuloma is present in the left upper lobe.  The right lower lobe demonstrates a focal cyst along the minor fissure.  A sub-4 mm solitary pulmonary nodule is visualized in the left lower lobe (series 303, image 55).  If the patient has low risk for malignancy no further follow-up is needed.  If the patient has high risk (smoking or other known risk factors), follow-up with CT is recommended in one year per Fleischner society guidelines.  IMPRESSION:         Agatston score 43.  This corresponds to the 25th-50th percentile for the patient's age and gender and represents mild plaque burden, with mild or minimal coronary stenosis.  Counseling and risk factor modification are indicated, along with cholesterol lowering per NECP guidelines.    ASSESSMENT:     1. Chest pain:  No recurrence  2. Hypertension:  Elevated today.  3. Hyperlipidemia:  LDL 98  4. Family history of coronary  disease  5. History abnormal CT of the chest     PLAN:     1.  Hypertension:  Monitor.  Titrate med as tolerated.    2. Hyperlipidemia:   continue current management.  3. Return to clinic 6 months.           Kaiden Barber MD, MPH, FACC, [facility]

## 2022-05-06 NOTE — TELEPHONE ENCOUNTER
No new care gaps identified.  [facility] Embedded Care Gaps. Reference number: [id number]. 5/06/2022   6:08:44 PM CDT

## 2022-05-07 NOTE — TELEPHONE ENCOUNTER
Refill Routing Note   Medication(s) are not appropriate for processing by [facility] for the following reason(s):      - Patient has not been seen in over 15 months by PCP    [facility] action(s):  Defer          Medication reconciliation completed: No     Appointments  past 12m or future 3m with PCP    Date Provider   Last Visit   12/7/2020 Nils Espino MD   Next Visit   Visit date not found Nils Espino MD   ED visits in past 90 days: 0        Note composed:7:08 PM 05/06/2022

## 2022-05-09 ENCOUNTER — PATIENT MESSAGE (OUTPATIENT)
Dept: FAMILY MEDICINE | Facility: CLINIC | Age: 67
End: 2022-05-09
Payer: MEDICARE

## 2022-05-09 NOTE — TELEPHONE ENCOUNTER
Spoke with patient,has been experiencing back pain for 4 days, describes it as sciatica pain, patient says he has lifted up on something, Dr Espino first available appt is June 1st 2022, patient is in agreement to see another provider,  appt for tomorrow 05/10/2022 @ 0830am to see Peyman Mohamud. Patient encouraged to call clinic back for any further questions or concerns.

## 2022-05-10 ENCOUNTER — OFFICE VISIT (OUTPATIENT)
Dept: FAMILY MEDICINE | Facility: CLINIC | Age: 67
End: 2022-05-10
Payer: MEDICARE

## 2022-05-10 VITALS
BODY MASS INDEX: 24.64 KG/M2 | WEIGHT: 171.75 LBS | OXYGEN SATURATION: 96 % | DIASTOLIC BLOOD PRESSURE: 70 MMHG | SYSTOLIC BLOOD PRESSURE: 140 MMHG | TEMPERATURE: 98 F | HEART RATE: 78 BPM

## 2022-05-10 DIAGNOSIS — Z13.6 ENCOUNTER FOR ABDOMINAL AORTIC ANEURYSM (AAA) SCREENING: ICD-10-CM

## 2022-05-10 DIAGNOSIS — M62.830 SPASM OF MUSCLE OF LOWER BACK: ICD-10-CM

## 2022-05-10 DIAGNOSIS — Z12.11 COLON CANCER SCREENING: ICD-10-CM

## 2022-05-10 DIAGNOSIS — I70.0 ATHEROSCLEROSIS OF AORTA: ICD-10-CM

## 2022-05-10 DIAGNOSIS — S39.012A LOW BACK STRAIN, INITIAL ENCOUNTER: Primary | ICD-10-CM

## 2022-05-10 DIAGNOSIS — E78.49 OTHER HYPERLIPIDEMIA: ICD-10-CM

## 2022-05-10 DIAGNOSIS — M25.551 PAIN IN RIGHT HIP: ICD-10-CM

## 2022-05-10 PROCEDURE — 99499 UNLISTED E&M SERVICE: CPT | Mod: S$GLB,,, | Performed by: NURSE PRACTITIONER

## 2022-05-10 PROCEDURE — 99499 RISK ADDL DX/OHS AUDIT: ICD-10-PCS | Mod: S$GLB,,, | Performed by: NURSE PRACTITIONER

## 2022-05-10 PROCEDURE — 3008F PR BODY MASS INDEX (BMI) DOCUMENTED: ICD-10-PCS | Mod: CPTII,S$GLB,, | Performed by: NURSE PRACTITIONER

## 2022-05-10 PROCEDURE — 3078F PR MOST RECENT DIASTOLIC BLOOD PRESSURE < 80 MM HG: ICD-10-PCS | Mod: CPTII,S$GLB,, | Performed by: NURSE PRACTITIONER

## 2022-05-10 PROCEDURE — 99214 OFFICE O/P EST MOD 30 MIN: CPT | Mod: S$GLB,,, | Performed by: NURSE PRACTITIONER

## 2022-05-10 PROCEDURE — 3077F PR MOST RECENT SYSTOLIC BLOOD PRESSURE >= 140 MM HG: ICD-10-PCS | Mod: CPTII,S$GLB,, | Performed by: NURSE PRACTITIONER

## 2022-05-10 PROCEDURE — 1101F PR PT FALLS ASSESS DOC 0-1 FALLS W/OUT INJ PAST YR: ICD-10-PCS | Mod: CPTII,S$GLB,, | Performed by: NURSE PRACTITIONER

## 2022-05-10 PROCEDURE — 1125F PR PAIN SEVERITY QUANTIFIED, PAIN PRESENT: ICD-10-PCS | Mod: CPTII,S$GLB,, | Performed by: NURSE PRACTITIONER

## 2022-05-10 PROCEDURE — 1101F PT FALLS ASSESS-DOCD LE1/YR: CPT | Mod: CPTII,S$GLB,, | Performed by: NURSE PRACTITIONER

## 2022-05-10 PROCEDURE — 99214 PR OFFICE/OUTPT VISIT, EST, LEVL IV, 30-39 MIN: ICD-10-PCS | Mod: S$GLB,,, | Performed by: NURSE PRACTITIONER

## 2022-05-10 PROCEDURE — 1159F PR MEDICATION LIST DOCUMENTED IN MEDICAL RECORD: ICD-10-PCS | Mod: CPTII,S$GLB,, | Performed by: NURSE PRACTITIONER

## 2022-05-10 PROCEDURE — 4010F PR ACE/ARB THEARPY RXD/TAKEN: ICD-10-PCS | Mod: CPTII,S$GLB,, | Performed by: NURSE PRACTITIONER

## 2022-05-10 PROCEDURE — 1125F AMNT PAIN NOTED PAIN PRSNT: CPT | Mod: CPTII,S$GLB,, | Performed by: NURSE PRACTITIONER

## 2022-05-10 PROCEDURE — 3008F BODY MASS INDEX DOCD: CPT | Mod: CPTII,S$GLB,, | Performed by: NURSE PRACTITIONER

## 2022-05-10 PROCEDURE — 99999 PR PBB SHADOW E&M-EST. PATIENT-LVL III: CPT | Mod: PBBFAC,,, | Performed by: NURSE PRACTITIONER

## 2022-05-10 PROCEDURE — 3077F SYST BP >= 140 MM HG: CPT | Mod: CPTII,S$GLB,, | Performed by: NURSE PRACTITIONER

## 2022-05-10 PROCEDURE — 99999 PR PBB SHADOW E&M-EST. PATIENT-LVL III: ICD-10-PCS | Mod: PBBFAC,,, | Performed by: NURSE PRACTITIONER

## 2022-05-10 PROCEDURE — 3288F FALL RISK ASSESSMENT DOCD: CPT | Mod: CPTII,S$GLB,, | Performed by: NURSE PRACTITIONER

## 2022-05-10 PROCEDURE — 1159F MED LIST DOCD IN RCRD: CPT | Mod: CPTII,S$GLB,, | Performed by: NURSE PRACTITIONER

## 2022-05-10 PROCEDURE — 4010F ACE/ARB THERAPY RXD/TAKEN: CPT | Mod: CPTII,S$GLB,, | Performed by: NURSE PRACTITIONER

## 2022-05-10 PROCEDURE — 3288F PR FALLS RISK ASSESSMENT DOCUMENTED: ICD-10-PCS | Mod: CPTII,S$GLB,, | Performed by: NURSE PRACTITIONER

## 2022-05-10 PROCEDURE — 3078F DIAST BP <80 MM HG: CPT | Mod: CPTII,S$GLB,, | Performed by: NURSE PRACTITIONER

## 2022-05-10 RX ORDER — GABAPENTIN 100 MG/1
200 CAPSULE ORAL 3 TIMES DAILY
Qty: 90 CAPSULE | Refills: 0 | Status: SHIPPED | OUTPATIENT
Start: 2022-05-10 | End: 2022-06-08

## 2022-05-10 RX ORDER — BACLOFEN 10 MG/1
10 TABLET ORAL 3 TIMES DAILY PRN
Qty: 60 TABLET | Refills: 0 | Status: SHIPPED | OUTPATIENT
Start: 2022-05-10 | End: 2022-06-03 | Stop reason: SDUPTHER

## 2022-05-10 NOTE — ASSESSMENT & PLAN NOTE
-exam c/w mm spasm with no red flags. + SLR left leg. Associated symptoms include sciatic nerve pain down left leg.   -reassurance provided   -advised pt that pain may last up to 4-6 weeks, but in the meantime, advised to add heat/massage and avoid provocative movements that could worsen pain, maintain good posture, as well as using proper lifting techniques.  -initiate tylenol, gabapentin, and muscle relaxer  -counseled patient at length about stretching/strengthening exercises, maintaining good posture as demonstrated in clinic (handout also given) to help with decreasing risk for future injury.    ED precautions given.   Notify provider if symptoms do not resolve or increase in severity.   Patient verbalizes understanding and agrees with plan of care.

## 2022-05-10 NOTE — ASSESSMENT & PLAN NOTE
discussed ways to lower triglycerides such as cutting simple sugars out of diet (white breads, candies, cookies, cakes, etc.) and reducing/eliminating intake of highly processed trans fatty acids.   Exercise 30 minutes a day for 4-5 days a week.   Eat more fiber.    Continue statin       Patient is a 62 year old male with a history of HFrEF 10-15% due to ischemic cardiomyopathy, s/p AICD, ?atrial fibrillation, hypertension, diabetes mellitus type 2, gout, and CKD for whom nephrology was called for [facility] from [facility] requiring hemodialysis.

## 2022-05-10 NOTE — PROGRESS NOTES
HPI     Chief Complaint:  Chief Complaint   Patient presents with    Back Pain    Back Injury       Dariusz Urbina is a 66 y.o. male with multiple medical diagnoses as listed in the medical history and problem list that presents for low back pain.  Pt is new to me but is known to this clinic with his last appointment being Visit date not found.      Back Pain  This is a new problem. The current episode started in the past 7 days. The problem occurs constantly. The problem is unchanged. The pain is present in the lumbar spine. The pain radiates to the left foot. The pain is moderate. The pain is the same all the time. The symptoms are aggravated by twisting and bending. Pertinent negatives include no bladder incontinence, bowel incontinence, chest pain, dysuria, fever, headaches, numbness, paresis, paresthesias, pelvic pain, perianal numbness, tingling, weakness or weight loss. Risk factors include poor posture (lifting heavy trailer). Treatments tried: narcotic. The treatment provided mild relief.     back pain for 4 days, describes it as sciatica pain, patient says he lifted a heavy trailer. Denies feeling sensation of pop or giving. Hx of lumbar fusion.     he is compliant with medications daily without any adverse side effects.    Patient Care Team:  Nils Espino MD as PCP - General (Internal Medicine)  Frances Thomas MA as Care Coordinator    History     Past Medical History:  Past Medical History:   Diagnosis Date    Decreased libido 11/11/2013    Dermatitis 10/12/2012    Hyperlipidemia 11/11/2013       Past Surgical History:  Past Surgical History:   Procedure Laterality Date    COLONOSCOPY      HIP REPLACEMENT ARTHROPLASTY  03/2019    LUMBAR FUSION      titanium rods    TONSILLECTOMY         Social History:  Social History     Socioeconomic History    Marital status:    Tobacco Use    Smoking status: Former Smoker     Packs/day: 1.00     Years: 10.00     Pack years: 10.00     Quit  date: 1989     Years since quittin.8    Smokeless tobacco: Never Used   Substance and Sexual Activity    Alcohol use: No    Drug use: No     Social Determinants of Health     Financial Resource Strain: Low Risk     Difficulty of Paying Living Expenses: Not hard at all   Food Insecurity: No Food Insecurity    Worried About Running Out of Food in the Last Year: Never true    Ran Out of Food in the Last Year: Never true   Transportation Needs: No Transportation Needs    Lack of Transportation (Medical): No    Lack of Transportation (Non-Medical): No   Physical Activity: Insufficiently Active    Days of Exercise per Week: 3 days    Minutes of Exercise per Session: 40 min   Stress: No Stress Concern Present    Feeling of Stress : Not at all   Social Connections: Unknown    Frequency of Communication with Friends and Family: More than three times a week    Frequency of Social Gatherings with Friends and Family: Twice a week    Active Member of Clubs or Organizations: Yes    Attends Club or Organization Meetings: More than 4 times per year    Marital Status:    Housing Stability: Low Risk     Unable to Pay for Housing in the Last Year: No    Number of Places Lived in the Last Year: 1    Unstable Housing in the Last Year: No       Family History:  History reviewed. No pertinent family history.    Allergies and Medications: (updated and reviewed)  Review of patient's allergies indicates:  No Known Allergies  Current Outpatient Medications   Medication Sig Dispense Refill    ascorbic acid, vitamin C, (VITAMIN C) 1000 MG tablet Take 1,000 mg by mouth.      cyanocobalamin (VITAMIN B-12) 1000 MCG tablet Take 100 mcg by mouth.      pravastatin (PRAVACHOL) 40 MG tablet TAKE 1 TABLET(40 MG) BY MOUTH EVERY DAY 30 tablet 0    valsartan (DIOVAN) 80 MG tablet Take 1 tablet (80 mg total) by mouth once daily. 90 tablet 3    vitamin D (VITAMIN D3) 1000 units Tab Take 1,000 Units by mouth.       baclofen (LIORESAL) 10 MG tablet Take 1 tablet (10 mg total) by mouth 3 (three) times daily as needed (muscle spasm). 60 tablet 0    famotidine (PEPCID) 20 MG tablet Take 1 tablet (20 mg total) by mouth 2 (two) times daily. for 14 days 28 tablet 0    gabapentin (NEURONTIN) 100 MG capsule Take 2 capsules (200 mg total) by mouth 3 (three) times daily. 90 capsule 0     No current facility-administered medications for this visit.       Exam     Review of Systems:  (as noted above)  Review of Systems   Constitutional: Negative for chills, fever, unexpected weight change and weight loss.   HENT: Negative for sore throat and trouble swallowing.    Eyes: Negative for visual disturbance.   Respiratory: Negative for cough, chest tightness, shortness of breath and wheezing.    Cardiovascular: Negative for chest pain and palpitations.   Gastrointestinal: Negative for anal bleeding, blood in stool and bowel incontinence.   Endocrine: Negative for polydipsia and polyphagia.   Genitourinary: Negative for bladder incontinence, decreased urine volume, dysuria, hematuria and pelvic pain.   Musculoskeletal: Positive for back pain. Negative for gait problem, neck pain and neck stiffness.   Skin: Negative for rash and wound.   Neurological: Negative for tingling, seizures, speech difficulty, weakness, numbness, headaches and paresthesias.   Psychiatric/Behavioral: Negative for confusion, decreased concentration and suicidal ideas.       Physical Exam:   Physical Exam  Constitutional:       General: He is not in acute distress.     Appearance: He is not ill-appearing or diaphoretic.   HENT:      Head: Normocephalic and atraumatic.   Eyes:      General: No scleral icterus.     Pupils: Pupils are equal, round, and reactive to light.   Neck:      Vascular: No carotid bruit.   Cardiovascular:      Rate and Rhythm: Normal rate and regular rhythm.      Pulses: Normal pulses.      Heart sounds: No murmur heard.    No friction rub. No gallop.    Pulmonary:      Effort: No respiratory distress.      Breath sounds: No wheezing.   Chest:      Chest wall: No tenderness.   Musculoskeletal:         General: No signs of injury.      Cervical back: No rigidity or tenderness.      Thoracic back: No tenderness. Normal range of motion.      Lumbar back: Spasms and tenderness present. Normal range of motion. Positive left straight leg raise test.   Lymphadenopathy:      Cervical: No cervical adenopathy.   Skin:     Capillary Refill: Capillary refill takes 2 to 3 seconds.      Findings: No rash.   Neurological:      Mental Status: He is alert.      Cranial Nerves: No cranial nerve deficit.      Sensory: No sensory deficit.      Motor: No weakness.   Psychiatric:         Mood and Affect: Mood normal.       Vitals:    05/10/22 0810   BP: (!) 140/70   BP Location: Left arm   Pulse: 78   Temp: 98.1 °F (36.7 °C)   TempSrc: Oral   SpO2: 96%   Weight: 77.9 kg (171 lb 11.8 oz)      Body mass index is 24.64 kg/m².    Assessment & Plan   (all problems are new to me)      Problem List Items Addressed This Visit        Cardiac/Vascular    Atherosclerosis of aorta    Current Assessment & Plan     -assessed and addressed all modifiable risk factors.  Continue with appropriate management to prevent complications with regards to lipid and BP monitoring.             Hyperlipidemia    Current Assessment & Plan     discussed ways to lower triglycerides such as cutting simple sugars out of diet (white breads, candies, cookies, cakes, etc.) and reducing/eliminating intake of highly processed trans fatty acids.   Exercise 30 minutes a day for 4-5 days a week.   Eat more fiber.    Continue statin                  Orthopedic    Pain in right hip    Current Assessment & Plan     Reports pain has resolved.               Other    Low back strain, initial encounter - Primary    Current Assessment & Plan     -exam c/w mm spasm with no red flags. + SLR left leg. Associated symptoms include sciatic  nerve pain down left leg.   -reassurance provided   -advised pt that pain may last up to 4-6 weeks, but in the meantime, advised to add heat/massage and avoid provocative movements that could worsen pain, maintain good posture, as well as using proper lifting techniques.  -initiate tylenol, gabapentin, and muscle relaxer  -counseled patient at length about stretching/strengthening exercises, maintaining good posture as demonstrated in clinic (handout also given) to help with decreasing risk for future injury.    ED precautions given.   Notify provider if symptoms do not resolve or increase in severity.   Patient verbalizes understanding and agrees with plan of care.                   Relevant Medications    baclofen (LIORESAL) 10 MG tablet      Other Visit Diagnoses     Colon cancer screening        Relevant Orders    Case Request Endoscopy: COLONOSCOPY (Completed)    Encounter for abdominal aortic aneurysm (AAA) screening        Relevant Orders    CV AAA Screening    Spasm of muscle of lower back        Relevant Medications    baclofen (LIORESAL) 10 MG tablet    gabapentin (NEURONTIN) 100 MG capsule          --------------------------------------------    Health Maintenance:  Health Maintenance       Date Due Completion Date    Abdominal Aortic Aneurysm Screening Never done ---    PROSTATE-SPECIFIC ANTIGEN 11/11/2021 11/11/2020    Colorectal Cancer Screening 01/09/2022 1/9/2012    COVID-19 Vaccine (4 - Booster for Pfizer series) 03/05/2022 11/5/2021    Influenza Vaccine (Season Ended) 09/01/2022 8/11/2020    Lipid Panel 12/06/2026 12/6/2021    TETANUS VACCINE 02/01/2028 2/1/2018          Health maintenance reviewed. AAA, colonoscopy.    Follow Up:  No follow-ups on file.      The patient expressed understanding and no barriers to adherence were identified.      - The patient indicates understanding of these issues and agrees with the plan. Brief care plan is updated and reviewed with the patient as applicable.       - The patient is given an After Visit Summary that lists all medications with directions, allergies, education, orders placed during this encounter and follow-up instructions.      - I have reviewed the patient's medical information including past medical, family, and social history sections including the medications and allergies.      - We discussed the patient's current medications.     This note was created by combination of typed  and MModal dictation.  Transcription errors may be present.  If there are any questions, please contact me.       Cecilio Mohamud NP

## 2022-05-10 NOTE — ASSESSMENT & PLAN NOTE
-assessed and addressed all modifiable risk factors.  Continue with appropriate management to prevent complications with regards to lipid and BP monitoring.

## 2022-05-11 RX ORDER — PRAVASTATIN SODIUM 40 MG/1
TABLET ORAL
Qty: 90 TABLET | OUTPATIENT
Start: 2022-05-11

## 2022-05-25 ENCOUNTER — PATIENT OUTREACH (OUTPATIENT)
Dept: ADMINISTRATIVE | Facility: HOSPITAL | Age: 67
End: 2022-05-25
Payer: MEDICARE

## 2022-05-31 DIAGNOSIS — Z12.11 SPECIAL SCREENING FOR MALIGNANT NEOPLASMS, COLON: Primary | ICD-10-CM

## 2022-05-31 RX ORDER — SODIUM, POTASSIUM,MAG SULFATES 17.5-3.13G
1 SOLUTION, RECONSTITUTED, ORAL ORAL DAILY
Qty: 1 KIT | Refills: 0 | Status: SHIPPED | OUTPATIENT
Start: 2022-05-31 | End: 2022-06-02

## 2022-05-31 NOTE — TELEPHONE ENCOUNTER
----- Message from Radha Mcneill sent at 5/31/2022  2:23 PM CDT -----  Regarding: Patient Advice  \          Name of Who is Calling: Doroteo Urbina    Who Left The Message: Doroteo Urbina      What is the request in detail:     Patient called requesting a call back regarding his current situation  with his back..  Please do so at your earliest convenience and further advise.     Thank you!    Reply by [facility]:  No      Preferred Call Back  : [phone]

## 2022-06-03 ENCOUNTER — PATIENT MESSAGE (OUTPATIENT)
Dept: FAMILY MEDICINE | Facility: CLINIC | Age: 67
End: 2022-06-03
Payer: MEDICARE

## 2022-06-03 ENCOUNTER — TELEPHONE (OUTPATIENT)
Dept: FAMILY MEDICINE | Facility: CLINIC | Age: 67
End: 2022-06-03
Payer: MEDICARE

## 2022-06-03 DIAGNOSIS — S39.012A LOW BACK STRAIN, INITIAL ENCOUNTER: ICD-10-CM

## 2022-06-03 DIAGNOSIS — M54.32 LEFT SIDED SCIATICA: Primary | ICD-10-CM

## 2022-06-03 DIAGNOSIS — M62.830 SPASM OF MUSCLE OF LOWER BACK: ICD-10-CM

## 2022-06-03 NOTE — TELEPHONE ENCOUNTER
This pt wants a CT and MRI for his back pain. I also noted he had seen [facility] a year ago. My question is should he go back to [facility] to be seen.

## 2022-06-03 NOTE — TELEPHONE ENCOUNTER
----- Message from Phillip Maldonado MA sent at 6/3/2022  8:13 AM CDT -----  Type: Patient Call Back    Who called:Self     What is the request in detail : pt. States he is still having back issues and would like orders placed for a CT scan & MRI..     Can the clinic reply by [facility]?no    Would the patient rather a call back or a response via [facility]? yes    Best call back number:[phone]

## 2022-06-06 ENCOUNTER — TELEPHONE (OUTPATIENT)
Dept: FAMILY MEDICINE | Facility: CLINIC | Age: 67
End: 2022-06-06
Payer: MEDICARE

## 2022-06-06 RX ORDER — BACLOFEN 10 MG/1
10 TABLET ORAL 3 TIMES DAILY PRN
Qty: 60 TABLET | Refills: 0 | Status: SHIPPED | OUTPATIENT
Start: 2022-06-06 | End: 2022-06-08

## 2022-06-06 NOTE — TELEPHONE ENCOUNTER
----- Message from Ender Robison sent at 6/6/2022  8:56 AM CDT -----  Regarding: Pt Advice  Contact: DANIELE CABELLO [[id number]]  Name of Who is Calling: DANIELE CABELLO [[id number]]      What is the request in detail: Would like to speak with staff in regards to back issues. States he has been reaching out through [facility] with no response.       Can the clinic reply by [facility]: yes      What Number to Call Back if not in [facility]: [phone]

## 2022-06-06 NOTE — TELEPHONE ENCOUNTER
----- Message from Ender Robison sent at 6/6/2022  8:56 AM CDT -----  Regarding: Pt Advice  Contact: DANIELE CABELLO [[id number]]  Name of Who is Calling: DANIELE CABELLO [[id number]]      What is the request in detail: Would like to speak with staff in regards to back issues. States he has been reaching out through [facility] with no response.       Can the clinic reply by [facility]: yes      What Number to Call Back if not in [facility]: [phone]

## 2022-06-08 ENCOUNTER — OFFICE VISIT (OUTPATIENT)
Dept: PAIN MEDICINE | Facility: CLINIC | Age: 67
End: 2022-06-08
Payer: MEDICARE

## 2022-06-08 VITALS
RESPIRATION RATE: 18 BRPM | DIASTOLIC BLOOD PRESSURE: 87 MMHG | HEIGHT: 70 IN | SYSTOLIC BLOOD PRESSURE: 154 MMHG | HEART RATE: 87 BPM | WEIGHT: 171 LBS | BODY MASS INDEX: 24.48 KG/M2

## 2022-06-08 DIAGNOSIS — M54.40 CHRONIC LOW BACK PAIN WITH SCIATICA, SCIATICA LATERALITY UNSPECIFIED, UNSPECIFIED BACK PAIN LATERALITY: ICD-10-CM

## 2022-06-08 DIAGNOSIS — M54.32 LEFT SIDED SCIATICA: ICD-10-CM

## 2022-06-08 DIAGNOSIS — M96.1 POST LAMINECTOMY SYNDROME: ICD-10-CM

## 2022-06-08 DIAGNOSIS — G89.29 CHRONIC LOW BACK PAIN WITH SCIATICA, SCIATICA LATERALITY UNSPECIFIED, UNSPECIFIED BACK PAIN LATERALITY: ICD-10-CM

## 2022-06-08 DIAGNOSIS — M17.12 ARTHRITIS OF LEFT KNEE: ICD-10-CM

## 2022-06-08 DIAGNOSIS — M25.562 ACUTE PAIN OF LEFT KNEE: Primary | ICD-10-CM

## 2022-06-08 PROCEDURE — 1160F PR REVIEW ALL MEDS BY PRESCRIBER/CLIN PHARMACIST DOCUMENTED: ICD-10-PCS | Mod: CPTII,S$GLB,, | Performed by: STUDENT IN AN ORGANIZED HEALTH CARE EDUCATION/TRAINING PROGRAM

## 2022-06-08 PROCEDURE — 3008F PR BODY MASS INDEX (BMI) DOCUMENTED: ICD-10-PCS | Mod: CPTII,S$GLB,, | Performed by: STUDENT IN AN ORGANIZED HEALTH CARE EDUCATION/TRAINING PROGRAM

## 2022-06-08 PROCEDURE — 1101F PT FALLS ASSESS-DOCD LE1/YR: CPT | Mod: CPTII,S$GLB,, | Performed by: STUDENT IN AN ORGANIZED HEALTH CARE EDUCATION/TRAINING PROGRAM

## 2022-06-08 PROCEDURE — 1159F PR MEDICATION LIST DOCUMENTED IN MEDICAL RECORD: ICD-10-PCS | Mod: CPTII,S$GLB,, | Performed by: STUDENT IN AN ORGANIZED HEALTH CARE EDUCATION/TRAINING PROGRAM

## 2022-06-08 PROCEDURE — 1159F MED LIST DOCD IN RCRD: CPT | Mod: CPTII,S$GLB,, | Performed by: STUDENT IN AN ORGANIZED HEALTH CARE EDUCATION/TRAINING PROGRAM

## 2022-06-08 PROCEDURE — 99204 PR OFFICE/OUTPT VISIT, NEW, LEVL IV, 45-59 MIN: ICD-10-PCS | Mod: S$GLB,,, | Performed by: STUDENT IN AN ORGANIZED HEALTH CARE EDUCATION/TRAINING PROGRAM

## 2022-06-08 PROCEDURE — 1160F RVW MEDS BY RX/DR IN RCRD: CPT | Mod: CPTII,S$GLB,, | Performed by: STUDENT IN AN ORGANIZED HEALTH CARE EDUCATION/TRAINING PROGRAM

## 2022-06-08 PROCEDURE — 1125F AMNT PAIN NOTED PAIN PRSNT: CPT | Mod: CPTII,S$GLB,, | Performed by: STUDENT IN AN ORGANIZED HEALTH CARE EDUCATION/TRAINING PROGRAM

## 2022-06-08 PROCEDURE — 4010F ACE/ARB THERAPY RXD/TAKEN: CPT | Mod: CPTII,S$GLB,, | Performed by: STUDENT IN AN ORGANIZED HEALTH CARE EDUCATION/TRAINING PROGRAM

## 2022-06-08 PROCEDURE — 3008F BODY MASS INDEX DOCD: CPT | Mod: CPTII,S$GLB,, | Performed by: STUDENT IN AN ORGANIZED HEALTH CARE EDUCATION/TRAINING PROGRAM

## 2022-06-08 PROCEDURE — 4010F PR ACE/ARB THEARPY RXD/TAKEN: ICD-10-PCS | Mod: CPTII,S$GLB,, | Performed by: STUDENT IN AN ORGANIZED HEALTH CARE EDUCATION/TRAINING PROGRAM

## 2022-06-08 PROCEDURE — 99204 OFFICE O/P NEW MOD 45 MIN: CPT | Mod: S$GLB,,, | Performed by: STUDENT IN AN ORGANIZED HEALTH CARE EDUCATION/TRAINING PROGRAM

## 2022-06-08 PROCEDURE — 3288F PR FALLS RISK ASSESSMENT DOCUMENTED: ICD-10-PCS | Mod: CPTII,S$GLB,, | Performed by: STUDENT IN AN ORGANIZED HEALTH CARE EDUCATION/TRAINING PROGRAM

## 2022-06-08 PROCEDURE — 3079F DIAST BP 80-89 MM HG: CPT | Mod: CPTII,S$GLB,, | Performed by: STUDENT IN AN ORGANIZED HEALTH CARE EDUCATION/TRAINING PROGRAM

## 2022-06-08 PROCEDURE — 3288F FALL RISK ASSESSMENT DOCD: CPT | Mod: CPTII,S$GLB,, | Performed by: STUDENT IN AN ORGANIZED HEALTH CARE EDUCATION/TRAINING PROGRAM

## 2022-06-08 PROCEDURE — 99999 PR PBB SHADOW E&M-EST. PATIENT-LVL III: CPT | Mod: PBBFAC,,, | Performed by: STUDENT IN AN ORGANIZED HEALTH CARE EDUCATION/TRAINING PROGRAM

## 2022-06-08 PROCEDURE — 1101F PR PT FALLS ASSESS DOC 0-1 FALLS W/OUT INJ PAST YR: ICD-10-PCS | Mod: CPTII,S$GLB,, | Performed by: STUDENT IN AN ORGANIZED HEALTH CARE EDUCATION/TRAINING PROGRAM

## 2022-06-08 PROCEDURE — 3077F SYST BP >= 140 MM HG: CPT | Mod: CPTII,S$GLB,, | Performed by: STUDENT IN AN ORGANIZED HEALTH CARE EDUCATION/TRAINING PROGRAM

## 2022-06-08 PROCEDURE — 1125F PR PAIN SEVERITY QUANTIFIED, PAIN PRESENT: ICD-10-PCS | Mod: CPTII,S$GLB,, | Performed by: STUDENT IN AN ORGANIZED HEALTH CARE EDUCATION/TRAINING PROGRAM

## 2022-06-08 PROCEDURE — 3079F PR MOST RECENT DIASTOLIC BLOOD PRESSURE 80-89 MM HG: ICD-10-PCS | Mod: CPTII,S$GLB,, | Performed by: STUDENT IN AN ORGANIZED HEALTH CARE EDUCATION/TRAINING PROGRAM

## 2022-06-08 PROCEDURE — 3077F PR MOST RECENT SYSTOLIC BLOOD PRESSURE >= 140 MM HG: ICD-10-PCS | Mod: CPTII,S$GLB,, | Performed by: STUDENT IN AN ORGANIZED HEALTH CARE EDUCATION/TRAINING PROGRAM

## 2022-06-08 PROCEDURE — 99999 PR PBB SHADOW E&M-EST. PATIENT-LVL III: ICD-10-PCS | Mod: PBBFAC,,, | Performed by: STUDENT IN AN ORGANIZED HEALTH CARE EDUCATION/TRAINING PROGRAM

## 2022-06-08 RX ORDER — TIZANIDINE 4 MG/1
4 TABLET ORAL NIGHTLY PRN
Qty: 30 TABLET | Refills: 0 | Status: SHIPPED | OUTPATIENT
Start: 2022-06-08 | End: 2022-07-08

## 2022-06-08 RX ORDER — NAPROXEN 500 MG/1
500 TABLET ORAL 2 TIMES DAILY WITH MEALS
Qty: 60 TABLET | Refills: 1 | Status: SHIPPED | OUTPATIENT
Start: 2022-06-08 | End: 2022-07-26

## 2022-06-08 RX ORDER — DICLOFENAC SODIUM 10 MG/G
2 GEL TOPICAL 4 TIMES DAILY
Qty: 2 EACH | Refills: 3 | Status: SHIPPED | OUTPATIENT
Start: 2022-06-08

## 2022-06-08 NOTE — PROGRESS NOTES
Chronic Pain - New Consult    Referring Physician: Nils Espino MD    Date: 06/08/2022     Re: Dariusz Urbina  MR#: [id number]  YOB: 1955  Age: 67 y.o.    Chief Complaint:   Chief Complaint   Patient presents with    Low-back Pain    Knee Pain     Outer Left knee    Leg Pain     Leg        **This note is dictated using the M*Modal Fluency Direct word recognition program. There are word recognition mistakes that are occasionally missed on review.**    ASSESSMENT: 67 y.o. year old male with knee and back pain, consistent with     1. Acute pain of left knee  X-Ray Knee 1 or 2 View Left    tiZANidine (ZANAFLEX) 4 MG tablet    diclofenac sodium (VOLTAREN) 1 % Gel    naproxen (NAPROSYN) 500 MG tablet   2. Left sided sciatica  Ambulatory referral/consult to Pain Clinic    X-Ray Lumbar Complete Including Flex And Ext   3. Post laminectomy syndrome  X-Ray Lumbar Complete Including Flex And Ext   4. Chronic low back pain with sciatica, sciatica laterality unspecified, unspecified back pain laterality  tiZANidine (ZANAFLEX) 4 MG tablet   5. Arthritis of left knee  diclofenac sodium (VOLTAREN) 1 % Gel    naproxen (NAPROSYN) 500 MG tablet         PLAN:     left acute knee pain  -unclear etiology. Knee physical exam was normal  -diclofenac  -XR knee  -naproxen 500gm BDI PRN  -discussed knee injection but unclear if would be significant benefit given odd presentation of pain. Patient defers  -he deferred a referral to sport medicine to evaluate    Back pain s/p prior fusion with occasional L sided sciatica  -back physical exam was normal.  -tizanidine 4mg qhs PRN for spasms at night to help him sleep  -Lumbar XR    - RTC PRN if doesn't resolve  - Counseled patient regarding the importance of activity modification.    The above plan and management options were discussed at length with patient. Patient is in agreement with the above and verbalized understanding. It will be communicated with the referring  "physician via electronic record, fax, or mail.  Lab/study reports reviewed were important and necessary because subsequent medical and treatment recommendations required review of the above lab/study reports. Images viewed/reviewed above were important and necessary because subsequent medical and treatment recommendations required review of the reviewed image(s).     Electronically signed by:  Mario Lamb DO  06/08/2022    =========================================================================================================    SUBJECTIVE:    Dariusz Urbina is a 67 y.o. male presents to the clinic for the evaluation of lower back pain. The pain started 3 weeks ago following no inciting event and symptoms have been worsening.  He has left knee pain that started after he was in [place] and was getting a massage.  A therapist decided to do a "sciatic stretch" on him and then that afternoon the knee started hurting very badly.  The pain is on the medial and lateral joint line along with the quad tendon. It is worse at night, but does not bother him so much during the day.    Pain Description:    The pain is located in the lower back area and radiates to the left knee.    At BEST  3/10   At WORST  9/10 on the WORST day.    On average pain is rated as 5/10.   Today the pain is rated as 2/10  The pain is continuous.  The pain is described as aching and dull    Symptoms interfere with daily activity, sleeping and work.   Exacerbating factors: Standing, Bending, Walking, Extension, Flexing, Lifting and Getting out of bed/chair.    Mitigating factors heat, ice, medications and rest.   He reports 7 hours of sleep per night.    Physical Therapy/Home Exercise: No, not currently in physical therapy or home exercise program    Current Pain Medications:    - ibuprofen, gabapentin (ran out)    Failed Pain Medications:    - baclofen    Pain Treatment Therapies:    Pain procedures: epidural injections  Physical Therapy: " after hip replacement  Spinal decompression: hx of L4-5 fusion  Joint replacement: right hip replacement     Patient denies urinary incontinence, bowel incontinence, significant motor weakness and loss of sensations.  Patient denies any suicidal or homicidal ideations     report:  Not applicable    Imaging:   N/A    Past Medical History:   Diagnosis Date    Decreased libido 2013    Dermatitis 10/12/2012    Hyperlipidemia 2013     Past Surgical History:   Procedure Laterality Date    COLONOSCOPY      HIP REPLACEMENT ARTHROPLASTY  2019    LUMBAR FUSION      titanium rods    TONSILLECTOMY       Social History     Socioeconomic History    Marital status:    Tobacco Use    Smoking status: Former Smoker     Packs/day: 1.00     Years: 10.00     Pack years: 10.00     Quit date: 1989     Years since quittin.9    Smokeless tobacco: Never Used   Substance and Sexual Activity    Alcohol use: No    Drug use: No     Social Determinants of Health     Financial Resource Strain: Low Risk     Difficulty of Paying Living Expenses: Not hard at all   Food Insecurity: No Food Insecurity    Worried About Running Out of Food in the Last Year: Never true    Ran Out of Food in the Last Year: Never true   Transportation Needs: No Transportation Needs    Lack of Transportation (Medical): No    Lack of Transportation (Non-Medical): No   Physical Activity: Insufficiently Active    Days of Exercise per Week: 3 days    Minutes of Exercise per Session: 40 min   Stress: No Stress Concern Present    Feeling of Stress : Not at all   Social Connections: Unknown    Frequency of Communication with Friends and Family: More than three times a week    Frequency of Social Gatherings with Friends and Family: Twice a week    Active Member of Clubs or Organizations: Yes    Attends Club or Organization Meetings: More than 4 times per year    Marital Status:    Housing Stability: Low Risk      Unable to Pay for Housing in the Last Year: No    Number of Places Lived in the Last Year: 1    Unstable Housing in the Last Year: No     No family history on file.    Review of patient's allergies indicates:  No Known Allergies    Current Outpatient Medications   Medication Sig    ascorbic acid, vitamin C, (VITAMIN C) 1000 MG tablet Take 1,000 mg by mouth.    cyanocobalamin (VITAMIN B-12) 1000 MCG tablet Take 100 mcg by mouth.    famotidine (PEPCID) 20 MG tablet Take 1 tablet (20 mg total) by mouth 2 (two) times daily. for 14 days    pravastatin (PRAVACHOL) 40 MG tablet TAKE 1 TABLET(40 MG) BY MOUTH EVERY DAY    valsartan (DIOVAN) 80 MG tablet Take 1 tablet (80 mg total) by mouth once daily.    vitamin D (VITAMIN D3) 1000 units Tab Take 1,000 Units by mouth.    diclofenac sodium (VOLTAREN) 1 % Gel Apply 2 g topically 4 (four) times daily.    naproxen (NAPROSYN) 500 MG tablet Take 1 tablet (500 mg total) by mouth 2 (two) times daily with meals.    tiZANidine (ZANAFLEX) 4 MG tablet Take 1 tablet (4 mg total) by mouth nightly as needed (pain at night).     No current facility-administered medications for this visit.       REVIEW OF SYSTEMS:    GENERAL:  No weight loss, malaise or fevers.  HEENT:   No recent changes in vision or hearing  NECK:  Negative for lumps, no difficulty with swallowing.  RESPIRATORY:  Negative for cough, wheezing or shortness of breath, patient denies any recent URI.  CARDIOVASCULAR:  Negative for chest pain, leg swelling or palpitations.  GI:  Negative for abdominal discomfort, blood in stools or black stools or change in bowel habits.  MUSCULOSKELETAL:  See HPI.  SKIN:  Negative for lesions, rash, and itching.  PSYCH:  No mood disorder or recent psychosocial stressors.  Patients sleep is not disturbed secondary to pain.  HEMATOLOGY/LYMPHOLOGY:  Negative for prolonged bleeding, bruising easily or swollen nodes.  Patient is not currently taking any anti-coagulants  NEURO:   No  "history of headaches, syncope, paralysis, seizures or tremors.  All other reviewed and negative other than HPI.    OBJECTIVE:    BP (!) 154/87   Pulse 87   Resp 18   Ht 5' 10" (1.778 m)   Wt 77.6 kg (171 lb)   BMI 24.54 kg/m²     PHYSICAL EXAMINATION:    GENERAL: Well appearing, in no acute distress, alert and oriented x3.  PSYCH:  Mood and affect appropriate.  SKIN: Skin color, texture, turgor normal, no rashes or lesions.  HEAD/FACE:  Normocephalic, atraumatic. Cranial nerves grossly intact.  CV: RRR with palpation of the radial artery.  PULM: CTAB. No evidence of respiratory difficulty, symmetric chest rise.  GI:  Soft and non-tender.    BACK:   - No obvious deformity or signs of trauma, Normal lumbar lordotic curve  - Negative spinous process tenderness  - Negative paravertebral tenderness  - Negative pain to palpation over the facet joints of the lumbar spine.   - Negative QL / Iliac crest / Glut tenderness  - Slump test is Negative for radicular pain  - Slump test is Negative for back pain  - Supine Straight leg raising is Negative for radicular pain  - Supine Straight leg raising is Negative for back pain  - Lumbar ROM is normal in Flexion without pain  - Lumbar ROM is normal in Extension without pain  - Lumbar ROM is normal in Lateral Flexion without pain  - Lumbar ROM is normal in Rotation without pain  - Negative Sustained Hip Flexion test (for discogenic pain)  - Negative Altered Gait, Posture  - Axial facet loading test Negative on the bilateral side(s)    SI Joint exam:  - Negative SI joint tenderness to palpation  - Herb's sign Negative  - Yeoman's Test: Did not perform for SI joint pain indicating anterior SI ligament involvement. Did not perform for anterior thigh pain/paresthesia which indicates femoral nerve stretch.  - Gaenslen's Test:Negative  - Finger Alexandra's Sign:Negative  - SI compression test:Did not perform  - SI distraction test:Did not perform  - Thigh Thrust: Did not perform  - " SI Thrust: Did not perform    MUSKULOSKELETAL:    EXTREMITIES:   Hip Exam:  - Log Roll Negative  - FADIR Negative  - Stinchfield Negative  - Hip Scour Negative  - GTB Tenderness Negative     Knee exam:  -No TTP to medial or lateral joint line  -No erythema  -no edema  (-) anterior and posterior drawer  (-) elvira  (-) pain with squat  (-) pain with varus/valgus stress    MUSCULOSKELETAL:  No atrophy or tone abnormalities are noted in the UE or LE.  No deformities, edema, or skin discoloration are noted on visible skin. Good capillary refill.    NEURO: Bilateral upper and lower extremity coordination and muscle stretch reflexes are physiologic and symmetric.      NEUROLOGICAL EXAM:  MENTAL STATUS: A x O x 3, good concentration, speech is fluent and goal directed  MEMORY: recent and remote are intact  CN: CN2-12 grossly intact  MOTOR: 5/5 in all muscle groups  DTRs: 0 symmetric  Sensation:    -no Loss of sensation in a left lower and right lower L-1, L-2, L-3, L-4 and L-5 bilaterally distribution.    GAIT: normal.

## 2022-06-09 ENCOUNTER — PATIENT MESSAGE (OUTPATIENT)
Dept: FAMILY MEDICINE | Facility: CLINIC | Age: 67
End: 2022-06-09
Payer: MEDICARE

## 2022-06-09 ENCOUNTER — HOSPITAL ENCOUNTER (OUTPATIENT)
Dept: RADIOLOGY | Facility: HOSPITAL | Age: 67
Discharge: HOME | End: 2022-06-09
Attending: STUDENT IN AN ORGANIZED HEALTH CARE EDUCATION/TRAINING PROGRAM
Payer: MEDICARE

## 2022-06-09 DIAGNOSIS — M25.562 ACUTE PAIN OF LEFT KNEE: ICD-10-CM

## 2022-06-09 DIAGNOSIS — M54.32 LEFT SIDED SCIATICA: ICD-10-CM

## 2022-06-09 DIAGNOSIS — M96.1 POST LAMINECTOMY SYNDROME: ICD-10-CM

## 2022-06-09 PROCEDURE — 73560 X-RAY EXAM OF KNEE 1 OR 2: CPT | Mod: TC,FY,LT

## 2022-06-09 PROCEDURE — 72114 X-RAY EXAM L-S SPINE BENDING: CPT | Mod: 26,,, | Performed by: RADIOLOGY

## 2022-06-09 PROCEDURE — 73560 XR KNEE 1 OR 2 VIEW LEFT: ICD-10-PCS | Mod: 26,LT,, | Performed by: RADIOLOGY

## 2022-06-09 PROCEDURE — 72114 XR LUMBAR SPINE 5 VIEW WITH FLEX AND EXT: ICD-10-PCS | Mod: 26,,, | Performed by: RADIOLOGY

## 2022-06-09 PROCEDURE — 72114 X-RAY EXAM L-S SPINE BENDING: CPT | Mod: TC,FY

## 2022-06-09 PROCEDURE — 73560 X-RAY EXAM OF KNEE 1 OR 2: CPT | Mod: 26,LT,, | Performed by: RADIOLOGY

## 2022-06-11 NOTE — TELEPHONE ENCOUNTER
Care Due:                  Date            Visit Type   Department     Provider  --------------------------------------------------------------------------------                                [facility]      MED/ INTERNAL  [place]  Last Visit: 12-      CARE ([facility])   MED/ PEDS      [place]                              [facility]/ INTERNAL  [facility]  Next Visit: 06-      CARE ([facility])   MED/ PEDS      [place]                                                            Last  Test          Frequency    Reason                     Performed    Due Date  --------------------------------------------------------------------------------    CMP.........  12 months..  pravastatin, valsartan...  11- 08-    [facility] Embedded Care Gaps. Reference number: [id number]. 6/11/2022   11:04:17 AM CDT

## 2022-06-12 NOTE — TELEPHONE ENCOUNTER
Refill Routing Note   Medication(s) are not appropriate for processing by [facility] for the following reason(s):      - Patient has not been seen in over 15 months by PCP  - Patient has been seen in the ED/Hospital since the last PCP visit    [facility] action(s):  Route Medication-related problems identified: Requires labs     Medication Therapy Plan: CDMR.LABS(CMP) FOV 06/16/22  Medication reconciliation completed: No     Appointments  past 12m or future 3m with PCP    Date Provider   Last Visit   12/7/2020 Nils Espino MD   Next Visit   6/16/2022 Nils Espino MD   ED visits in past 90 days: 0        Note composed:4:26 PM 06/12/2022

## 2022-06-13 RX ORDER — PRAVASTATIN SODIUM 40 MG/1
TABLET ORAL
Qty: 90 TABLET | Refills: 0 | Status: SHIPPED | OUTPATIENT
Start: 2022-06-13 | End: 2022-08-16 | Stop reason: SDUPTHER

## 2022-06-15 ENCOUNTER — PATIENT MESSAGE (OUTPATIENT)
Dept: FAMILY MEDICINE | Facility: CLINIC | Age: 67
End: 2022-06-15
Payer: MEDICARE

## 2022-06-15 ENCOUNTER — TELEPHONE (OUTPATIENT)
Dept: ENDOSCOPY | Facility: HOSPITAL | Age: 67
End: 2022-06-15
Payer: MEDICARE

## 2022-06-15 ENCOUNTER — PATIENT MESSAGE (OUTPATIENT)
Dept: ENDOSCOPY | Facility: HOSPITAL | Age: 67
End: 2022-06-15
Payer: MEDICARE

## 2022-06-15 DIAGNOSIS — M54.16 LUMBAR RADICULOPATHY: Primary | ICD-10-CM

## 2022-06-15 NOTE — TELEPHONE ENCOUNTER
Pt states he uses ice packs to get relief for his back he believes something is seriously wrong with his back and needs an mri , please advise

## 2022-06-15 NOTE — TELEPHONE ENCOUNTER
----- Message from Susan Schaeffer sent at 6/15/2022  4:24 PM CDT -----  Contact: @[phone]  Pt requesting a call back regarding his arrival time for his scheduled colonoscopy tomorrow.  Please call to discuss further.

## 2022-06-16 ENCOUNTER — ANESTHESIA EVENT (OUTPATIENT)
Dept: ENDOSCOPY | Facility: HOSPITAL | Age: 67
End: 2022-06-16
Payer: MEDICARE

## 2022-06-16 ENCOUNTER — HOSPITAL ENCOUNTER (OUTPATIENT)
Facility: HOSPITAL | Age: 67
Discharge: HOME | End: 2022-06-16
Attending: INTERNAL MEDICINE | Admitting: INTERNAL MEDICINE
Payer: MEDICARE

## 2022-06-16 ENCOUNTER — PATIENT OUTREACH (OUTPATIENT)
Dept: ADMINISTRATIVE | Facility: HOSPITAL | Age: 67
End: 2022-06-16
Payer: MEDICARE

## 2022-06-16 ENCOUNTER — ANESTHESIA (OUTPATIENT)
Dept: ENDOSCOPY | Facility: HOSPITAL | Age: 67
End: 2022-06-16
Payer: MEDICARE

## 2022-06-16 VITALS
RESPIRATION RATE: 18 BRPM | OXYGEN SATURATION: 98 % | DIASTOLIC BLOOD PRESSURE: 70 MMHG | HEART RATE: 65 BPM | TEMPERATURE: 98 F | SYSTOLIC BLOOD PRESSURE: 137 MMHG

## 2022-06-16 DIAGNOSIS — Z12.11 COLON CANCER SCREENING: ICD-10-CM

## 2022-06-16 PROCEDURE — 27201012 HC FORCEPS, HOT/COLD, DISP: Performed by: INTERNAL MEDICINE

## 2022-06-16 PROCEDURE — 37000008 HC ANESTHESIA 1ST 15 MINUTES: Performed by: INTERNAL MEDICINE

## 2022-06-16 PROCEDURE — D9220A PRA ANESTHESIA: ICD-10-PCS | Mod: PT,CRNA,, | Performed by: NURSE ANESTHETIST, CERTIFIED REGISTERED

## 2022-06-16 PROCEDURE — 45380 COLONOSCOPY AND BIOPSY: CPT | Mod: PT | Performed by: INTERNAL MEDICINE

## 2022-06-16 PROCEDURE — 45380 PR COLONOSCOPY,BIOPSY: ICD-10-PCS | Mod: PT,,, | Performed by: INTERNAL MEDICINE

## 2022-06-16 PROCEDURE — D9220A PRA ANESTHESIA: ICD-10-PCS | Mod: PT,ANES,, | Performed by: ANESTHESIOLOGY

## 2022-06-16 PROCEDURE — 27201089 HC SNARE, DISP (ANY): Performed by: INTERNAL MEDICINE

## 2022-06-16 PROCEDURE — 88305 TISSUE EXAM BY PATHOLOGIST: CPT | Performed by: PATHOLOGY

## 2022-06-16 PROCEDURE — 37000009 HC ANESTHESIA EA ADD 15 MINS: Performed by: INTERNAL MEDICINE

## 2022-06-16 PROCEDURE — 88305 TISSUE EXAM BY PATHOLOGIST: CPT | Mod: 26,,, | Performed by: PATHOLOGY

## 2022-06-16 PROCEDURE — 88305 TISSUE EXAM BY PATHOLOGIST: ICD-10-PCS | Mod: 26,,, | Performed by: PATHOLOGY

## 2022-06-16 PROCEDURE — D9220A PRA ANESTHESIA: Mod: PT,CRNA,, | Performed by: NURSE ANESTHETIST, CERTIFIED REGISTERED

## 2022-06-16 PROCEDURE — D9220A PRA ANESTHESIA: Mod: PT,ANES,, | Performed by: ANESTHESIOLOGY

## 2022-06-16 PROCEDURE — 25000003 PHARM REV CODE 250: Performed by: NURSE ANESTHETIST, CERTIFIED REGISTERED

## 2022-06-16 PROCEDURE — 45380 COLONOSCOPY AND BIOPSY: CPT | Mod: PT,,, | Performed by: INTERNAL MEDICINE

## 2022-06-16 PROCEDURE — 63600175 PHARM REV CODE 636 W HCPCS: Performed by: NURSE ANESTHETIST, CERTIFIED REGISTERED

## 2022-06-16 RX ORDER — PROPOFOL 10 MG/ML
VIAL (ML) INTRAVENOUS
Status: DISCONTINUED | OUTPATIENT
Start: 2022-06-16 | End: 2022-06-16

## 2022-06-16 RX ORDER — LIDOCAINE HYDROCHLORIDE 20 MG/ML
INJECTION INTRAVENOUS
Status: DISCONTINUED | OUTPATIENT
Start: 2022-06-16 | End: 2022-06-16

## 2022-06-16 RX ORDER — PROPOFOL 10 MG/ML
INJECTION, EMULSION INTRAVENOUS
Status: DISCONTINUED
Start: 2022-06-16 | End: 2022-06-16 | Stop reason: HOSPADM

## 2022-06-16 RX ORDER — LIDOCAINE HYDROCHLORIDE 20 MG/ML
INJECTION, SOLUTION EPIDURAL; INFILTRATION; INTRACAUDAL; PERINEURAL
Status: DISCONTINUED
Start: 2022-06-16 | End: 2022-06-16 | Stop reason: HOSPADM

## 2022-06-16 RX ORDER — SODIUM CHLORIDE 9 MG/ML
INJECTION, SOLUTION INTRAVENOUS CONTINUOUS
Status: DISCONTINUED | OUTPATIENT
Start: 2022-06-16 | End: 2022-06-16 | Stop reason: HOSPADM

## 2022-06-16 RX ADMIN — PROPOFOL 20 MG: 10 INJECTION, EMULSION INTRAVENOUS at 01:06

## 2022-06-16 RX ADMIN — PROPOFOL 40 MG: 10 INJECTION, EMULSION INTRAVENOUS at 01:06

## 2022-06-16 RX ADMIN — PROPOFOL 80 MG: 10 INJECTION, EMULSION INTRAVENOUS at 01:06

## 2022-06-16 RX ADMIN — SODIUM CHLORIDE: 0.9 INJECTION, SOLUTION INTRAVENOUS at 01:06

## 2022-06-16 RX ADMIN — LIDOCAINE HYDROCHLORIDE 60 MG: 20 INJECTION, SOLUTION INTRAVENOUS at 01:06

## 2022-06-16 NOTE — ANESTHESIA PREPROCEDURE EVALUATION
2022  Dariusz Urbina is a 67 y.o., male.  To undergo Procedure(s) (LRB):  COLONOSCOPY (N/A)     Denies CP/SOB/GERD/MI/CVA/URI symptoms.  METS > 4  NPO > 8    Past Medical History:  Past Medical History:   Diagnosis Date    Decreased libido 2013    Dermatitis 10/12/2012    Hyperlipidemia 2013       Past Surgical History:  Past Surgical History:   Procedure Laterality Date    COLONOSCOPY      HIP REPLACEMENT ARTHROPLASTY  2019    LUMBAR FUSION      titanium rods    TONSILLECTOMY         Social History:  Social History     Socioeconomic History    Marital status:    Tobacco Use    Smoking status: Former Smoker     Packs/day: 1.00     Years: 10.00     Pack years: 10.00     Quit date: 1989     Years since quittin.9    Smokeless tobacco: Never Used   Substance and Sexual Activity    Alcohol use: No    Drug use: No     Social Determinants of Health     Financial Resource Strain: Low Risk     Difficulty of Paying Living Expenses: Not hard at all   Food Insecurity: No Food Insecurity    Worried About Running Out of Food in the Last Year: Never true    Ran Out of Food in the Last Year: Never true   Transportation Needs: No Transportation Needs    Lack of Transportation (Medical): No    Lack of Transportation (Non-Medical): No   Physical Activity: Insufficiently Active    Days of Exercise per Week: 3 days    Minutes of Exercise per Session: 40 min   Stress: No Stress Concern Present    Feeling of Stress : Not at all   Social Connections: Unknown    Frequency of Communication with Friends and Family: More than three times a week    Frequency of Social Gatherings with Friends and Family: Twice a week    Active Member of Clubs or Organizations: Yes    Attends Club or Organization Meetings: More than 4 times per year    Marital Status:    Housing  Stability: Low Risk     Unable to Pay for Housing in the Last Year: No    Number of Places Lived in the Last Year: 1    Unstable Housing in the Last Year: No       Medications:  No current facility-administered medications on file prior to encounter.     Current Outpatient Medications on File Prior to Encounter   Medication Sig Dispense Refill    ascorbic acid, vitamin C, (VITAMIN C) 1000 MG tablet Take 1,000 mg by mouth.      cyanocobalamin (VITAMIN B-12) 1000 MCG tablet Take 100 mcg by mouth.      famotidine (PEPCID) 20 MG tablet Take 1 tablet (20 mg total) by mouth 2 (two) times daily. for 14 days 28 tablet 0    valsartan (DIOVAN) 80 MG tablet Take 1 tablet (80 mg total) by mouth once daily. 90 tablet 3    vitamin D (VITAMIN D3) 1000 units Tab Take 1,000 Units by mouth.      [DISCONTINUED] gabapentin (NEURONTIN) 100 MG capsule Take 2 capsules (200 mg total) by mouth 3 (three) times daily. 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  · The estimated ejection fraction is 65%.  · Normal left ventricular diastolic function.  · Normal right ventricular size with normal right ventricular systolic function.  · Mild left atrial enlargement.  · Mild mitral regurgitation.  · Mild  tricuspid regurgitation.  · Normal central venous pressure (3 mmHg).  · The estimated PA systolic pressure is 30 mmHg.    24 Hour Vitals:      See Nursing Charting For Additional Vitals      Pre-op Assessment    I have reviewed the Patient Summary Reports.     I have reviewed the Nursing Notes.       Review of Systems  Anesthesia Hx:  No problems with previous Anesthesia   Denies Personal Hx of Anesthesia complications.   Social:  Former Smoker, No Alcohol Use    Cardiovascular:   Exercise tolerance: good hyperlipidemia    Pulmonary:  Pulmonary Normal    Hepatic/GI:  Hepatic/GI Normal    Neurological:  Neurology Normal    Endocrine:  Endocrine Normal        Physical Exam  General: Well nourished    Airway:  Mallampati: II   Mouth Opening: Normal  TM Distance: Normal      Dental:  Intact    Chest/Lungs:  Clear to auscultation    Heart:  Rate: Normal  Rhythm: Regular Rhythm        Anesthesia Plan  Type of Anesthesia, risks & benefits discussed:    Anesthesia Type: Gen ETT, Gen Natural Airway, MAC  Intra-op Monitoring Plan: Standard ASA Monitors  Post Op Pain Control Plan: multimodal analgesia  Induction:  IV  Informed Consent: Informed consent signed with the Patient and all parties understand the risks and agree with anesthesia plan.  All questions answered.   ASA Score: 2    Ready For Surgery From Anesthesia Perspective.     .

## 2022-06-16 NOTE — TRANSFER OF CARE
Anesthesia Transfer of Care Note    Patient: Dariusz Urbina    Procedure(s) Performed: Procedure(s) (LRB):  COLONOSCOPY (N/A)    Patient location: GI    Anesthesia Type: general    Transport from OR: Transported from OR on room air with adequate spontaneous ventilation    Post pain: adequate analgesia    Post assessment: no apparent anesthetic complications and tolerated procedure well    Post vital signs: stable    Level of consciousness: sedated    Nausea/Vomiting: no nausea/vomiting    Complications: none    Transfer of care protocol was followed      Last vitals:   Visit Vitals  /68 (BP Location: Left arm, Patient Position: Lying)   Pulse 68   Temp 36.5 °C (97.7 °F) (Oral)   Resp 17   SpO2 97%

## 2022-06-16 NOTE — H&P
Short Stay Endoscopy History and Physical    PCP - Nils Espino MD    Procedure - Colonoscopy  ASA - per anesthesia  Mallampati - per anesthesia  History of Anesthesia problems - no  Family history Anesthesia problems - no   Plan of anesthesia - General    HPI:  This is a 67 y.o. male here for evaluation of : asymptomatic screening exam      ROS:  Constitutional: No fevers, chills, No weight loss  CV: No chest pain  Pulm: No cough, No shortness of breath  GI: see HPI  Derm: No rash    Medical History:  has a past medical history of Decreased libido (11/11/2013), Dermatitis (10/12/2012), and Hyperlipidemia (11/11/2013).    Surgical History:  has a past surgical history that includes Lumbar fusion; Colonoscopy; Tonsillectomy; and Hip replacement arthroplasty (03/2019).    Family History: family history is not on file.. Otherwise no colon cancer, inflammatory bowel disease, or GI malignancies.    Social History:  reports that he quit smoking about 32 years ago. He has a 10.00 pack-year smoking history. He has never used smokeless tobacco. He reports that he does not drink alcohol and does not use drugs.    Review of patient's allergies indicates:  No Known Allergies    Medications:   Medications Prior to Admission   Medication Sig Dispense Refill Last Dose    ascorbic acid, vitamin C, (VITAMIN C) 1000 MG tablet Take 1,000 mg by mouth.       cyanocobalamin (VITAMIN B-12) 1000 MCG tablet Take 100 mcg by mouth.       diclofenac sodium (VOLTAREN) 1 % Gel Apply 2 g topically 4 (four) times daily. 2 each 3     famotidine (PEPCID) 20 MG tablet Take 1 tablet (20 mg total) by mouth 2 (two) times daily. for 14 days 28 tablet 0     naproxen (NAPROSYN) 500 MG tablet Take 1 tablet (500 mg total) by mouth 2 (two) times daily with meals. 60 tablet 1     pravastatin (PRAVACHOL) 40 MG tablet TAKE 1 TABLET(40 MG) BY MOUTH EVERY DAY 90 tablet 0     tiZANidine (ZANAFLEX) 4 MG tablet Take 1 tablet (4 mg total) by mouth  nightly as needed (pain at night). 30 tablet 0     valsartan (DIOVAN) 80 MG tablet Take 1 tablet (80 mg total) by mouth once daily. 90 tablet 3     vitamin D (VITAMIN D3) 1000 units Tab Take 1,000 Units by mouth.            Physical Exam:    Vital Signs: There were no vitals filed for this visit.    Gen: NAD, lying comfortably  HENT: NCAT, oropharynx clear  Eyes: anicteric sclerae, EOMI grossly  Neck: supple, no visible masses/goiter  Cardiac: RRR  Lungs: non-labored breathing  Abd: soft, NT/ND, normoactive BS  Ext: no LE edema, warm, well perfused  Skin: skin intact on exposed body parts, no visible rashes, lesions  Neuro: A&Ox4, neuro exam grossly intact, moves all extremities  Psych: appropriate mood, affect        Labs:  Lab Results   Component Value Date    WBC 5.98 11/11/2020    HGB 13.1 (L) 11/11/2020    HCT 41.7 11/11/2020     11/11/2020    CHOL 168 12/06/2021    TRIG 165 (H) 12/06/2021    HDL 37 (L) 12/06/2021    ALT 14 11/11/2020    AST 19 11/11/2020     11/11/2020    K 4.5 11/11/2020     11/11/2020    CREATININE 1.0 11/11/2020    BUN 14 11/11/2020    CO2 25 11/11/2020    TSH 2.955 11/11/2020    PSA 1.3 07/30/2018    INR 1.0 05/02/2019    HGBA1C 6.4 (H) 11/11/2020       Plan:  Colonoscopy for colon cancer screening.    I have explained the risks and benefits of endoscopy procedures to the patient including but not limited to bleeding, perforation, infection, and death.  The patient was asked if they understand and allowed to ask any further questions to their satisfaction.    Omaira Mcclelland MD

## 2022-06-16 NOTE — PROVATION PATIENT INSTRUCTIONS
Discharge Summary/Instructions after an Endoscopic Procedure  Patient Name: Dariusz Urbina  Patient MRN: [id number]  Patient YOB: 1955 Thursday, June 16, 2022  Omaira Mcclelland MD  Dear patient,  As a result of recent federal legislation (The Federal Cures Act), you may   receive lab or pathology results from your procedure in your [facility]   account before your physician is able to contact you. Your physician or   their representative will relay the results to you with their   recommendations at their soonest availability.  Thank you,  RESTRICTIONS:  During your procedure today, you received medications for sedation.  These   medications may affect your judgment, balance and coordination.  Therefore,   for 24 hours, you have the following restrictions:   - DO NOT drive a car, operate machinery, make legal/financial decisions,   sign important papers or drink alcohol.    ACTIVITY:  Today: no heavy lifting, straining or running due to procedural   sedation/anesthesia.  The following day: return to full activity including work.  DIET:  Eat and drink normally unless instructed otherwise.     TREATMENT FOR COMMON SIDE EFFECTS:  - Mild abdominal pain, nausea, belching, bloating or excessive gas:  rest,   eat lightly and use a heating pad.  - Sore Throat: treat with throat lozenges and/or gargle with warm salt   water.  - Because air was used during the procedure, expelling large amounts of air   from your rectum or belching is normal.  - If a bowel prep was taken, you may not have a bowel movement for 1-3 days.    This is normal.  SYMPTOMS TO WATCH FOR AND REPORT TO YOUR PHYSICIAN:  1. Abdominal pain or bloating, other than gas cramps.  2. Chest pain.  3. Back pain.  4. Signs of infection such as: chills or fever occurring within 24 hours   after the procedure.  5. Rectal bleeding, which would show as bright red, maroon, or black stools.   (A tablespoon of blood from the rectum is not serious, especially if    hemorrhoids are present.)  6. Vomiting.  7. Weakness or dizziness.  GO DIRECTLY TO THE NEAREST EMERGENCY ROOM IF YOU HAVE ANY OF THE FOLLOWING:      Difficulty breathing              Chills and/or fever over 101 F   Persistent vomiting and/or vomiting blood   Severe abdominal pain   Severe chest pain   Black, tarry stools   Bleeding- more than one tablespoon   Any other symptom or condition that you feel may need urgent attention  Your doctor recommends these additional instructions:  If any biopsies were taken, your doctors clinic will contact you in 1 to 2   weeks with any results.  - Discharge patient to home.   - Resume previous diet.   - Continue present medications.   - Await pathology results.   - Repeat colonoscopy in 7 years for surveillance.  For questions, problems or results please call your physician - Omaira Mcclelland MD at Work:  [phone].  [facility] can be reached at [phone]     IF A COMPLICATION OR EMERGENCY SITUATION ARISES AND YOU ARE UNABLE TO REACH   YOUR PHYSICIAN - GO DIRECTLY TO THE EMERGENCY ROOM.  Omaira Mcclelland MD  6/16/2022 2:00:43 PM  This report has been verified and signed electronically.  Dear patient,  As a result of recent federal legislation (The Federal Cures Act), you may   receive lab or pathology results from your procedure in your [facility]   account before your physician is able to contact you. Your physician or   their representative will relay the results to you with their   recommendations at their soonest availability.  Thank you,  PROVATION

## 2022-06-16 NOTE — OR NURSING
PROCEDURE AND RECOVERY COMPLETED. DR. PADILLA DISCUSSED RESULTS WITH PATIENT AND SPOUSE. PATIENT DENIES ANY CONCERNS OR DISCOMFORTS; UNDERSTANDING VERBALIZED. PATIENT READY FOR DISCHARGE.

## 2022-06-16 NOTE — ANESTHESIA POSTPROCEDURE EVALUATION
Anesthesia Post Evaluation    Patient: Dariusz Urbina    Procedure(s) Performed: Procedure(s) (LRB):  COLONOSCOPY (N/A)    Final Anesthesia Type: general      Patient location during evaluation: GI PACU  Patient participation: Yes- Able to Participate  Level of consciousness: awake and alert and oriented  Post-procedure vital signs: reviewed and stable  Pain management: adequate  Airway patency: patent    PONV status at discharge: No PONV  Anesthetic complications: no      Cardiovascular status: hemodynamically stable and blood pressure returned to baseline  Respiratory status: spontaneous ventilation, room air and unassisted  Hydration status: euvolemic  Follow-up not needed.          Vitals Value Taken Time   /70 06/16/22 1429   Temp 36.5 °C (97.7 °F) 06/16/22 1359   Pulse 65 06/16/22 1429   Resp 18 06/16/22 1429   SpO2 98 % 06/16/22 1429         Event Time   Out of Recovery 14:29:00         Pain/Kourtney Score: Kourtney Score: 10 (6/16/2022  2:41 PM)

## 2022-06-17 ENCOUNTER — PATIENT MESSAGE (OUTPATIENT)
Dept: FAMILY MEDICINE | Facility: CLINIC | Age: 67
End: 2022-06-17
Payer: MEDICARE

## 2022-06-21 ENCOUNTER — PATIENT MESSAGE (OUTPATIENT)
Dept: FAMILY MEDICINE | Facility: CLINIC | Age: 67
End: 2022-06-21
Payer: MEDICARE

## 2022-06-21 ENCOUNTER — HOSPITAL ENCOUNTER (OUTPATIENT)
Dept: RADIOLOGY | Facility: OTHER | Age: 67
Discharge: HOME | End: 2022-06-21
Attending: INTERNAL MEDICINE
Payer: MEDICARE

## 2022-06-21 DIAGNOSIS — M54.32 LEFT SIDED SCIATICA: Primary | ICD-10-CM

## 2022-06-21 DIAGNOSIS — M54.16 LUMBAR RADICULOPATHY: ICD-10-CM

## 2022-06-21 DIAGNOSIS — M51.9 LUMBAR DISC DISEASE: ICD-10-CM

## 2022-06-21 LAB
FINAL PATHOLOGIC DIAGNOSIS: NORMAL
GROSS: NORMAL
Lab: NORMAL

## 2022-06-21 PROCEDURE — 72148 MRI LUMBAR SPINE WITHOUT CONTRAST: ICD-10-PCS | Mod: 26,,, | Performed by: RADIOLOGY

## 2022-06-21 PROCEDURE — 72148 MRI LUMBAR SPINE W/O DYE: CPT | Mod: TC

## 2022-06-21 PROCEDURE — 72148 MRI LUMBAR SPINE W/O DYE: CPT | Mod: 26,,, | Performed by: RADIOLOGY

## 2022-06-28 ENCOUNTER — OFFICE VISIT (OUTPATIENT)
Dept: PAIN MEDICINE | Facility: CLINIC | Age: 67
End: 2022-06-28
Payer: MEDICARE

## 2022-06-28 ENCOUNTER — TELEPHONE (OUTPATIENT)
Dept: PAIN MEDICINE | Facility: CLINIC | Age: 67
End: 2022-06-28
Payer: MEDICARE

## 2022-06-28 VITALS
RESPIRATION RATE: 18 BRPM | BODY MASS INDEX: 24.48 KG/M2 | DIASTOLIC BLOOD PRESSURE: 96 MMHG | HEIGHT: 70 IN | WEIGHT: 171 LBS | HEART RATE: 77 BPM | SYSTOLIC BLOOD PRESSURE: 155 MMHG

## 2022-06-28 DIAGNOSIS — M54.16 LUMBAR RADICULOPATHY: Primary | ICD-10-CM

## 2022-06-28 DIAGNOSIS — M54.16 LEFT LUMBAR RADICULOPATHY: Primary | ICD-10-CM

## 2022-06-28 PROCEDURE — 1101F PT FALLS ASSESS-DOCD LE1/YR: CPT | Mod: CPTII,S$GLB,, | Performed by: STUDENT IN AN ORGANIZED HEALTH CARE EDUCATION/TRAINING PROGRAM

## 2022-06-28 PROCEDURE — 1159F PR MEDICATION LIST DOCUMENTED IN MEDICAL RECORD: ICD-10-PCS | Mod: CPTII,S$GLB,, | Performed by: STUDENT IN AN ORGANIZED HEALTH CARE EDUCATION/TRAINING PROGRAM

## 2022-06-28 PROCEDURE — 3288F FALL RISK ASSESSMENT DOCD: CPT | Mod: CPTII,S$GLB,, | Performed by: STUDENT IN AN ORGANIZED HEALTH CARE EDUCATION/TRAINING PROGRAM

## 2022-06-28 PROCEDURE — 99999 PR PBB SHADOW E&M-EST. PATIENT-LVL III: ICD-10-PCS | Mod: PBBFAC,,, | Performed by: STUDENT IN AN ORGANIZED HEALTH CARE EDUCATION/TRAINING PROGRAM

## 2022-06-28 PROCEDURE — 1101F PR PT FALLS ASSESS DOC 0-1 FALLS W/OUT INJ PAST YR: ICD-10-PCS | Mod: CPTII,S$GLB,, | Performed by: STUDENT IN AN ORGANIZED HEALTH CARE EDUCATION/TRAINING PROGRAM

## 2022-06-28 PROCEDURE — 1125F AMNT PAIN NOTED PAIN PRSNT: CPT | Mod: CPTII,S$GLB,, | Performed by: STUDENT IN AN ORGANIZED HEALTH CARE EDUCATION/TRAINING PROGRAM

## 2022-06-28 PROCEDURE — 1125F PR PAIN SEVERITY QUANTIFIED, PAIN PRESENT: ICD-10-PCS | Mod: CPTII,S$GLB,, | Performed by: STUDENT IN AN ORGANIZED HEALTH CARE EDUCATION/TRAINING PROGRAM

## 2022-06-28 PROCEDURE — 99999 PR PBB SHADOW E&M-EST. PATIENT-LVL III: CPT | Mod: PBBFAC,,, | Performed by: STUDENT IN AN ORGANIZED HEALTH CARE EDUCATION/TRAINING PROGRAM

## 2022-06-28 PROCEDURE — 99214 PR OFFICE/OUTPT VISIT, EST, LEVL IV, 30-39 MIN: ICD-10-PCS | Mod: S$GLB,,, | Performed by: STUDENT IN AN ORGANIZED HEALTH CARE EDUCATION/TRAINING PROGRAM

## 2022-06-28 PROCEDURE — 3080F DIAST BP >= 90 MM HG: CPT | Mod: CPTII,S$GLB,, | Performed by: STUDENT IN AN ORGANIZED HEALTH CARE EDUCATION/TRAINING PROGRAM

## 2022-06-28 PROCEDURE — 4010F ACE/ARB THERAPY RXD/TAKEN: CPT | Mod: CPTII,S$GLB,, | Performed by: STUDENT IN AN ORGANIZED HEALTH CARE EDUCATION/TRAINING PROGRAM

## 2022-06-28 PROCEDURE — 3077F SYST BP >= 140 MM HG: CPT | Mod: CPTII,S$GLB,, | Performed by: STUDENT IN AN ORGANIZED HEALTH CARE EDUCATION/TRAINING PROGRAM

## 2022-06-28 PROCEDURE — 1159F MED LIST DOCD IN RCRD: CPT | Mod: CPTII,S$GLB,, | Performed by: STUDENT IN AN ORGANIZED HEALTH CARE EDUCATION/TRAINING PROGRAM

## 2022-06-28 PROCEDURE — 3008F BODY MASS INDEX DOCD: CPT | Mod: CPTII,S$GLB,, | Performed by: STUDENT IN AN ORGANIZED HEALTH CARE EDUCATION/TRAINING PROGRAM

## 2022-06-28 PROCEDURE — 1160F PR REVIEW ALL MEDS BY PRESCRIBER/CLIN PHARMACIST DOCUMENTED: ICD-10-PCS | Mod: CPTII,S$GLB,, | Performed by: STUDENT IN AN ORGANIZED HEALTH CARE EDUCATION/TRAINING PROGRAM

## 2022-06-28 PROCEDURE — 99214 OFFICE O/P EST MOD 30 MIN: CPT | Mod: S$GLB,,, | Performed by: STUDENT IN AN ORGANIZED HEALTH CARE EDUCATION/TRAINING PROGRAM

## 2022-06-28 PROCEDURE — 3008F PR BODY MASS INDEX (BMI) DOCUMENTED: ICD-10-PCS | Mod: CPTII,S$GLB,, | Performed by: STUDENT IN AN ORGANIZED HEALTH CARE EDUCATION/TRAINING PROGRAM

## 2022-06-28 PROCEDURE — 3288F PR FALLS RISK ASSESSMENT DOCUMENTED: ICD-10-PCS | Mod: CPTII,S$GLB,, | Performed by: STUDENT IN AN ORGANIZED HEALTH CARE EDUCATION/TRAINING PROGRAM

## 2022-06-28 PROCEDURE — 3077F PR MOST RECENT SYSTOLIC BLOOD PRESSURE >= 140 MM HG: ICD-10-PCS | Mod: CPTII,S$GLB,, | Performed by: STUDENT IN AN ORGANIZED HEALTH CARE EDUCATION/TRAINING PROGRAM

## 2022-06-28 PROCEDURE — 3080F PR MOST RECENT DIASTOLIC BLOOD PRESSURE >= 90 MM HG: ICD-10-PCS | Mod: CPTII,S$GLB,, | Performed by: STUDENT IN AN ORGANIZED HEALTH CARE EDUCATION/TRAINING PROGRAM

## 2022-06-28 PROCEDURE — 4010F PR ACE/ARB THEARPY RXD/TAKEN: ICD-10-PCS | Mod: CPTII,S$GLB,, | Performed by: STUDENT IN AN ORGANIZED HEALTH CARE EDUCATION/TRAINING PROGRAM

## 2022-06-28 PROCEDURE — 1160F RVW MEDS BY RX/DR IN RCRD: CPT | Mod: CPTII,S$GLB,, | Performed by: STUDENT IN AN ORGANIZED HEALTH CARE EDUCATION/TRAINING PROGRAM

## 2022-06-28 NOTE — TELEPHONE ENCOUNTER
Staff spoke with patient, patient informed us that he is running a little behind due to traffic and a delay in his flight. Staff verbalized understanding.

## 2022-06-28 NOTE — H&P (VIEW-ONLY)
Chronic Pain - f/u    Referring Physician: No ref. provider found    Date: 06/28/2022     Re: Dariusz Urbina  MR#: [id number]  YOB: 1955  Age: 67 y.o.    Chief Complaint:   Chief Complaint   Patient presents with    Low-back Pain    Knee Pain     left     **This note is dictated using the M*Modal Fluency Direct word recognition program. There are word recognition mistakes that are occasionally missed on review.**    ASSESSMENT: 67 y.o. year old male with knee and back pain, consistent with     1. Left lumbar radiculopathy           PLAN:     Left lumbar radiculopathy  -having more pain from the back down the left L3/4 pattern  -MRI shows left sided disc herniation at L3  -schedule Left L3-4 TFESI. Risks, benefits, and alternatives were discussed with the patient, and He would like to proceed.  -if TFESI not helpful, then will refer to spine surgery for evaluation.    left acute knee pain  -unclear etiology. Knee physical exam was normal. Could be coming from the back  -XR knee - DJD  -naproxen 500mg BID PRN  -discussed knee injection but unclear if would be significant benefit given odd presentation of pain. Patient defers  -he deferred a referral to sport medicine to evaluate    Back pain s/p prior fusion L4-5 with occasional L sided sciatica  -tizanidine 4mg qhs PRN for spasms at night to help him sleep    - RTC 4 weeks  - Counseled patient regarding the importance of activity modification.    The above plan and management options were discussed at length with patient. Patient is in agreement with the above and verbalized understanding. It will be communicated with the referring physician via electronic record, fax, or mail.  Lab/study reports reviewed were important and necessary because subsequent medical and treatment recommendations required review of the above lab/study reports. Images viewed/reviewed above were important and necessary because subsequent medical and treatment recommendations  "required review of the reviewed image(s).     Electronically signed by:  Mario Lamb DO  06/28/2022    =========================================================================================================    SUBJECTIVE:      Interval History 6/28/2022:     Dariusz Urbina is a 67 y.o. male presents to the clinic for follow up.  Since last visit the pain has is unchanged. The pain is located in the left back and radiation to the left leg/knee.  States that it continues to bother him, and he would like to get something done ASAP.    The pain is located in the lower back area and radiates to the left knee.  The pain is described as aching, dull and shooting    At BEST  1/10   At WORST  9/10 on the WORST day.    On average pain is rated as 5/10.   Today the pain is rated as 2/10  Symptoms interfere with daily activity and sleeping.   Exacerbating factors: nothing specific.    Mitigating factors ice and medications.     Current pain medications:  Ibuprofen 2-3x daily, hydrocodone 0.5 tabs qOD, tizanidine qhs  Failed Pain Medications:  baclofen    Pain procedures: epidural injections in the past before his prior surgery.  Physical Therapy: after hip replacement  Spinal decompression: hx of L4-5 fusion    Initial hx:  Dariusz Urbina is a 67 y.o. male presents to the clinic for the evaluation of lower back pain. The pain started 3 weeks ago following no inciting event and symptoms have been worsening.  He has left knee pain that started after he was in [place] and was getting a massage.  A therapist decided to do a "sciatic stretch" on him and then that afternoon the knee started hurting very badly.  The pain is on the medial and lateral joint line along with the quad tendon. It is worse at night, but does not bother him so much during the day.    Pain Description:    The pain is located in the lower back area and radiates to the left knee.    At BEST  3/10   At WORST  9/10 on the WORST day.    On average pain is " rated as 5/10.   Today the pain is rated as 2/10  The pain is continuous.  The pain is described as aching and dull    Symptoms interfere with daily activity, sleeping and work.   Exacerbating factors: Standing, Bending, Walking, Extension, Flexing, Lifting and Getting out of bed/chair.    Mitigating factors heat, ice, medications and rest.   He reports 7 hours of sleep per night.    Physical Therapy/Home Exercise: No, not currently in physical therapy or home exercise program    Current Pain Medications:    - ibuprofen, gabapentin (ran out)    Failed Pain Medications:    - baclofen    Pain Treatment Therapies:    Pain procedures: epidural injections  Physical Therapy: after hip replacement  Spinal decompression: hx of L4-5 fusion  Joint replacement: right hip replacement     Patient denies urinary incontinence, bowel incontinence, significant motor weakness and loss of sensations.  Patient denies any suicidal or homicidal ideations     report:  Not applicable    Imaging:   N/A    Past Medical History:   Diagnosis Date    Decreased libido 2013    Dermatitis 10/12/2012    Hyperlipidemia 2013    Hypertension      Past Surgical History:   Procedure Laterality Date    COLONOSCOPY      COLONOSCOPY N/A 2022    Procedure: COLONOSCOPY;  Surgeon: Omaira Mcclelland MD;  Location: [facility];  Service: Endoscopy;  Laterality: N/A;  fully vaccinated  instructions emailed   [facility] provider from Dr. Alvarez to Dr. Mcclelland---    HIP REPLACEMENT ARTHROPLASTY  2019    LUMBAR FUSION      titanium rods    TONSILLECTOMY       Social History     Socioeconomic History    Marital status:    Tobacco Use    Smoking status: Former Smoker     Packs/day: 1.00     Years: 10.00     Pack years: 10.00     Quit date: 1989     Years since quittin.9    Smokeless tobacco: Never Used   Substance and Sexual Activity    Alcohol use: No    Drug use: No     Social Determinants of Health     Financial  Resource Strain: Low Risk     Difficulty of Paying Living Expenses: Not hard at all   Food Insecurity: No Food Insecurity    Worried About Running Out of Food in the Last Year: Never true    Ran Out of Food in the Last Year: Never true   Transportation Needs: No Transportation Needs    Lack of Transportation (Medical): No    Lack of Transportation (Non-Medical): No   Physical Activity: Insufficiently Active    Days of Exercise per Week: 3 days    Minutes of Exercise per Session: 40 min   Stress: No Stress Concern Present    Feeling of Stress : Not at all   Social Connections: Unknown    Frequency of Communication with Friends and Family: More than three times a week    Frequency of Social Gatherings with Friends and Family: Twice a week    Active Member of Clubs or Organizations: Yes    Attends Club or Organization Meetings: More than 4 times per year    Marital Status:    Housing Stability: Low Risk     Unable to Pay for Housing in the Last Year: No    Number of Places Lived in the Last Year: 1    Unstable Housing in the Last Year: No     No family history on file.    Review of patient's allergies indicates:  No Known Allergies    Current Outpatient Medications   Medication Sig    ascorbic acid, vitamin C, (VITAMIN C) 1000 MG tablet Take 1,000 mg by mouth.    cyanocobalamin (VITAMIN B-12) 1000 MCG tablet Take 100 mcg by mouth.    diclofenac sodium (VOLTAREN) 1 % Gel Apply 2 g topically 4 (four) times daily.    naproxen (NAPROSYN) 500 MG tablet Take 1 tablet (500 mg total) by mouth 2 (two) times daily with meals.    pravastatin (PRAVACHOL) 40 MG tablet TAKE 1 TABLET(40 MG) BY MOUTH EVERY DAY    tiZANidine (ZANAFLEX) 4 MG tablet Take 1 tablet (4 mg total) by mouth nightly as needed (pain at night).    valsartan (DIOVAN) 80 MG tablet Take 1 tablet (80 mg total) by mouth once daily.    vitamin D (VITAMIN D3) 1000 units Tab Take 1,000 Units by mouth.    famotidine (PEPCID) 20 MG tablet  "Take 1 tablet (20 mg total) by mouth 2 (two) times daily. for 14 days     No current facility-administered medications for this visit.       REVIEW OF SYSTEMS:    GENERAL:  No weight loss, malaise or fevers.  HEENT:   No recent changes in vision or hearing  NECK:  Negative for lumps, no difficulty with swallowing.  RESPIRATORY:  Negative for cough, wheezing or shortness of breath, patient denies any recent URI.  CARDIOVASCULAR:  Negative for chest pain, leg swelling or palpitations.  GI:  Negative for abdominal discomfort, blood in stools or black stools or change in bowel habits.  MUSCULOSKELETAL:  See HPI.  SKIN:  Negative for lesions, rash, and itching.  PSYCH:  No mood disorder or recent psychosocial stressors.  Patients sleep is not disturbed secondary to pain.  HEMATOLOGY/LYMPHOLOGY:  Negative for prolonged bleeding, bruising easily or swollen nodes.  Patient is not currently taking any anti-coagulants  NEURO:   No history of headaches, syncope, paralysis, seizures or tremors.  All other reviewed and negative other than HPI.    OBJECTIVE:    BP (!) 155/96   Pulse 77   Resp 18   Ht 5' 10" (1.778 m)   Wt 77.6 kg (171 lb)   BMI 24.54 kg/m²     PHYSICAL EXAMINATION:    GENERAL: Well appearing, in no acute distress, alert and oriented x3.  PSYCH:  Mood and affect appropriate.  SKIN: Skin color, texture, turgor normal, no rashes or lesions.  HEAD/FACE:  Normocephalic, atraumatic. Cranial nerves grossly intact.  CV: RRR with palpation of the radial artery.  PULM: CTAB. No evidence of respiratory difficulty, symmetric chest rise.  GI:  Soft and non-tender.    BACK:   - No obvious deformity or signs of trauma, Normal lumbar lordotic curve  - Negative spinous process tenderness  - Negative paravertebral tenderness  - Negative pain to palpation over the facet joints of the lumbar spine.   - Negative QL / Iliac crest / Glut tenderness  - Slump test is Negative for radicular pain  - Slump test is Negative for back " pain  - Supine Straight leg raising is Positive for radicular pain  - Supine Straight leg raising is Negative for back pain  - Lumbar ROM is normal in Flexion without pain  - Lumbar ROM is normal in Extension without pain  - Lumbar ROM is normal in Lateral Flexion without pain  - Lumbar ROM is normal in Rotation without pain  - Negative Sustained Hip Flexion test (for discogenic pain)  - Negative Altered Gait, Posture  - Axial facet loading test Negative on the bilateral side(s)    SI Joint exam:  - Negative SI joint tenderness to palpation  - Herb's sign Negative  - Yeoman's Test: Did not perform for SI joint pain indicating anterior SI ligament involvement. Positive for anterior thigh pain/paresthesia which indicates femoral nerve stretch.  - Gaenslen's Test:Negative  - Finger Alexandra's Sign:Negative  - SI compression test:Did not perform  - SI distraction test:Did not perform  - Thigh Thrust: Did not perform  - SI Thrust: Did not perform    MUSKULOSKELETAL:    EXTREMITIES:   Hip Exam:  - Log Roll Negative  - FADIR Negative  - Stinchfield Negative  - Hip Scour Negative  - GTB Tenderness Negative     Knee exam:  -No TTP to medial or lateral joint line  -No erythema  -no edema  (-) anterior and posterior drawer  (-) elvira  (-) pain with squat  (-) pain with varus/valgus stress    MUSCULOSKELETAL:  No atrophy or tone abnormalities are noted in the UE or LE.  No deformities, edema, or skin discoloration are noted on visible skin. Good capillary refill.    NEURO: Bilateral upper and lower extremity coordination and muscle stretch reflexes are physiologic and symmetric.      NEUROLOGICAL EXAM:  MENTAL STATUS: A x O x 3, good concentration, speech is fluent and goal directed  MEMORY: recent and remote are intact  CN: CN2-12 grossly intact  MOTOR: 5/5 in all muscle groups  DTRs: 0 symmetric  Sensation:    -no Loss of sensation in a left lower and right lower L-1, L-2, L-3, L-4 and L-5 bilaterally  distribution.    GAIT: normal.

## 2022-06-28 NOTE — TELEPHONE ENCOUNTER
----- Message from [facility] sent at 6/28/2022 10:28 AM CDT -----  Type:  Needs Medical Advice    Who Called: PT   Would the patient rather a call back or a response via [facility]? Callback   Best Call Back Number: [phone]  Additional Information: Pt wanted to let office know they were still on their way to the appt. PT is dealing with traffic.

## 2022-06-28 NOTE — PROGRESS NOTES
Chronic Pain - f/u    Referring Physician: No ref. provider found    Date: 06/28/2022     Re: Dariusz Urbina  MR#: [id number]  YOB: 1955  Age: 67 y.o.    Chief Complaint:   Chief Complaint   Patient presents with    Low-back Pain    Knee Pain     left     **This note is dictated using the M*Modal Fluency Direct word recognition program. There are word recognition mistakes that are occasionally missed on review.**    ASSESSMENT: 67 y.o. year old male with knee and back pain, consistent with     1. Left lumbar radiculopathy           PLAN:     Left lumbar radiculopathy  -having more pain from the back down the left L3/4 pattern  -MRI shows left sided disc herniation at L3  -schedule Left L3-4 TFESI. Risks, benefits, and alternatives were discussed with the patient, and He would like to proceed.  -if TFESI not helpful, then will refer to spine surgery for evaluation.    left acute knee pain  -unclear etiology. Knee physical exam was normal. Could be coming from the back  -XR knee - DJD  -naproxen 500mg BID PRN  -discussed knee injection but unclear if would be significant benefit given odd presentation of pain. Patient defers  -he deferred a referral to sport medicine to evaluate    Back pain s/p prior fusion L4-5 with occasional L sided sciatica  -tizanidine 4mg qhs PRN for spasms at night to help him sleep    - RTC 4 weeks  - Counseled patient regarding the importance of activity modification.    The above plan and management options were discussed at length with patient. Patient is in agreement with the above and verbalized understanding. It will be communicated with the referring physician via electronic record, fax, or mail.  Lab/study reports reviewed were important and necessary because subsequent medical and treatment recommendations required review of the above lab/study reports. Images viewed/reviewed above were important and necessary because subsequent medical and treatment recommendations  "required review of the reviewed image(s).     Electronically signed by:  Mario Lamb DO  06/28/2022    =========================================================================================================    SUBJECTIVE:      Interval History 6/28/2022:     Dariusz Urbina is a 67 y.o. male presents to the clinic for follow up.  Since last visit the pain has is unchanged. The pain is located in the left back and radiation to the left leg/knee.  States that it continues to bother him, and he would like to get something done ASAP.    The pain is located in the lower back area and radiates to the left knee.  The pain is described as aching, dull and shooting    At BEST  1/10   At WORST  9/10 on the WORST day.    On average pain is rated as 5/10.   Today the pain is rated as 2/10  Symptoms interfere with daily activity and sleeping.   Exacerbating factors: nothing specific.    Mitigating factors ice and medications.     Current pain medications:  Ibuprofen 2-3x daily, hydrocodone 0.5 tabs qOD, tizanidine qhs  Failed Pain Medications:  baclofen    Pain procedures: epidural injections in the past before his prior surgery.  Physical Therapy: after hip replacement  Spinal decompression: hx of L4-5 fusion    Initial hx:  Dariusz Urbina is a 67 y.o. male presents to the clinic for the evaluation of lower back pain. The pain started 3 weeks ago following no inciting event and symptoms have been worsening.  He has left knee pain that started after he was in [place] and was getting a massage.  A therapist decided to do a "sciatic stretch" on him and then that afternoon the knee started hurting very badly.  The pain is on the medial and lateral joint line along with the quad tendon. It is worse at night, but does not bother him so much during the day.    Pain Description:    The pain is located in the lower back area and radiates to the left knee.    At BEST  3/10   At WORST  9/10 on the WORST day.    On average pain is " rated as 5/10.   Today the pain is rated as 2/10  The pain is continuous.  The pain is described as aching and dull    Symptoms interfere with daily activity, sleeping and work.   Exacerbating factors: Standing, Bending, Walking, Extension, Flexing, Lifting and Getting out of bed/chair.    Mitigating factors heat, ice, medications and rest.   He reports 7 hours of sleep per night.    Physical Therapy/Home Exercise: No, not currently in physical therapy or home exercise program    Current Pain Medications:    - ibuprofen, gabapentin (ran out)    Failed Pain Medications:    - baclofen    Pain Treatment Therapies:    Pain procedures: epidural injections  Physical Therapy: after hip replacement  Spinal decompression: hx of L4-5 fusion  Joint replacement: right hip replacement     Patient denies urinary incontinence, bowel incontinence, significant motor weakness and loss of sensations.  Patient denies any suicidal or homicidal ideations     report:  Not applicable    Imaging:   N/A    Past Medical History:   Diagnosis Date    Decreased libido 2013    Dermatitis 10/12/2012    Hyperlipidemia 2013    Hypertension      Past Surgical History:   Procedure Laterality Date    COLONOSCOPY      COLONOSCOPY N/A 2022    Procedure: COLONOSCOPY;  Surgeon: Omaira Mcclelland MD;  Location: [facility];  Service: Endoscopy;  Laterality: N/A;  fully vaccinated  instructions emailed   [facility] provider from Dr. Alvarez to Dr. Mcclelland---    HIP REPLACEMENT ARTHROPLASTY  2019    LUMBAR FUSION      titanium rods    TONSILLECTOMY       Social History     Socioeconomic History    Marital status:    Tobacco Use    Smoking status: Former Smoker     Packs/day: 1.00     Years: 10.00     Pack years: 10.00     Quit date: 1989     Years since quittin.9    Smokeless tobacco: Never Used   Substance and Sexual Activity    Alcohol use: No    Drug use: No     Social Determinants of Health     Financial  Resource Strain: Low Risk     Difficulty of Paying Living Expenses: Not hard at all   Food Insecurity: No Food Insecurity    Worried About Running Out of Food in the Last Year: Never true    Ran Out of Food in the Last Year: Never true   Transportation Needs: No Transportation Needs    Lack of Transportation (Medical): No    Lack of Transportation (Non-Medical): No   Physical Activity: Insufficiently Active    Days of Exercise per Week: 3 days    Minutes of Exercise per Session: 40 min   Stress: No Stress Concern Present    Feeling of Stress : Not at all   Social Connections: Unknown    Frequency of Communication with Friends and Family: More than three times a week    Frequency of Social Gatherings with Friends and Family: Twice a week    Active Member of Clubs or Organizations: Yes    Attends Club or Organization Meetings: More than 4 times per year    Marital Status:    Housing Stability: Low Risk     Unable to Pay for Housing in the Last Year: No    Number of Places Lived in the Last Year: 1    Unstable Housing in the Last Year: No     No family history on file.    Review of patient's allergies indicates:  No Known Allergies    Current Outpatient Medications   Medication Sig    ascorbic acid, vitamin C, (VITAMIN C) 1000 MG tablet Take 1,000 mg by mouth.    cyanocobalamin (VITAMIN B-12) 1000 MCG tablet Take 100 mcg by mouth.    diclofenac sodium (VOLTAREN) 1 % Gel Apply 2 g topically 4 (four) times daily.    naproxen (NAPROSYN) 500 MG tablet Take 1 tablet (500 mg total) by mouth 2 (two) times daily with meals.    pravastatin (PRAVACHOL) 40 MG tablet TAKE 1 TABLET(40 MG) BY MOUTH EVERY DAY    tiZANidine (ZANAFLEX) 4 MG tablet Take 1 tablet (4 mg total) by mouth nightly as needed (pain at night).    valsartan (DIOVAN) 80 MG tablet Take 1 tablet (80 mg total) by mouth once daily.    vitamin D (VITAMIN D3) 1000 units Tab Take 1,000 Units by mouth.    famotidine (PEPCID) 20 MG tablet  "Take 1 tablet (20 mg total) by mouth 2 (two) times daily. for 14 days     No current facility-administered medications for this visit.       REVIEW OF SYSTEMS:    GENERAL:  No weight loss, malaise or fevers.  HEENT:   No recent changes in vision or hearing  NECK:  Negative for lumps, no difficulty with swallowing.  RESPIRATORY:  Negative for cough, wheezing or shortness of breath, patient denies any recent URI.  CARDIOVASCULAR:  Negative for chest pain, leg swelling or palpitations.  GI:  Negative for abdominal discomfort, blood in stools or black stools or change in bowel habits.  MUSCULOSKELETAL:  See HPI.  SKIN:  Negative for lesions, rash, and itching.  PSYCH:  No mood disorder or recent psychosocial stressors.  Patients sleep is not disturbed secondary to pain.  HEMATOLOGY/LYMPHOLOGY:  Negative for prolonged bleeding, bruising easily or swollen nodes.  Patient is not currently taking any anti-coagulants  NEURO:   No history of headaches, syncope, paralysis, seizures or tremors.  All other reviewed and negative other than HPI.    OBJECTIVE:    BP (!) 155/96   Pulse 77   Resp 18   Ht 5' 10" (1.778 m)   Wt 77.6 kg (171 lb)   BMI 24.54 kg/m²     PHYSICAL EXAMINATION:    GENERAL: Well appearing, in no acute distress, alert and oriented x3.  PSYCH:  Mood and affect appropriate.  SKIN: Skin color, texture, turgor normal, no rashes or lesions.  HEAD/FACE:  Normocephalic, atraumatic. Cranial nerves grossly intact.  CV: RRR with palpation of the radial artery.  PULM: CTAB. No evidence of respiratory difficulty, symmetric chest rise.  GI:  Soft and non-tender.    BACK:   - No obvious deformity or signs of trauma, Normal lumbar lordotic curve  - Negative spinous process tenderness  - Negative paravertebral tenderness  - Negative pain to palpation over the facet joints of the lumbar spine.   - Negative QL / Iliac crest / Glut tenderness  - Slump test is Negative for radicular pain  - Slump test is Negative for back " pain  - Supine Straight leg raising is Positive for radicular pain  - Supine Straight leg raising is Negative for back pain  - Lumbar ROM is normal in Flexion without pain  - Lumbar ROM is normal in Extension without pain  - Lumbar ROM is normal in Lateral Flexion without pain  - Lumbar ROM is normal in Rotation without pain  - Negative Sustained Hip Flexion test (for discogenic pain)  - Negative Altered Gait, Posture  - Axial facet loading test Negative on the bilateral side(s)    SI Joint exam:  - Negative SI joint tenderness to palpation  - Herb's sign Negative  - Yeoman's Test: Did not perform for SI joint pain indicating anterior SI ligament involvement. Positive for anterior thigh pain/paresthesia which indicates femoral nerve stretch.  - Gaenslen's Test:Negative  - Finger Alexandra's Sign:Negative  - SI compression test:Did not perform  - SI distraction test:Did not perform  - Thigh Thrust: Did not perform  - SI Thrust: Did not perform    MUSKULOSKELETAL:    EXTREMITIES:   Hip Exam:  - Log Roll Negative  - FADIR Negative  - Stinchfield Negative  - Hip Scour Negative  - GTB Tenderness Negative     Knee exam:  -No TTP to medial or lateral joint line  -No erythema  -no edema  (-) anterior and posterior drawer  (-) elvira  (-) pain with squat  (-) pain with varus/valgus stress    MUSCULOSKELETAL:  No atrophy or tone abnormalities are noted in the UE or LE.  No deformities, edema, or skin discoloration are noted on visible skin. Good capillary refill.    NEURO: Bilateral upper and lower extremity coordination and muscle stretch reflexes are physiologic and symmetric.      NEUROLOGICAL EXAM:  MENTAL STATUS: A x O x 3, good concentration, speech is fluent and goal directed  MEMORY: recent and remote are intact  CN: CN2-12 grossly intact  MOTOR: 5/5 in all muscle groups  DTRs: 0 symmetric  Sensation:    -no Loss of sensation in a left lower and right lower L-1, L-2, L-3, L-4 and L-5 bilaterally  distribution.    GAIT: normal.

## 2022-06-29 ENCOUNTER — TELEPHONE (OUTPATIENT)
Dept: PAIN MEDICINE | Facility: CLINIC | Age: 67
End: 2022-06-29
Payer: MEDICARE

## 2022-06-29 NOTE — TELEPHONE ENCOUNTER
Staff spoke with the patient regarding their procedure with Dr. Lamb scheduled on 07/01/2022; the patient was provided the Arrival Information and scheduled time 11:00AM.     The patient verbalized understanding.    Thank you,

## 2022-07-01 ENCOUNTER — HOSPITAL ENCOUNTER (OUTPATIENT)
Facility: HOSPITAL | Age: 67
Discharge: HOME | End: 2022-07-01
Attending: STUDENT IN AN ORGANIZED HEALTH CARE EDUCATION/TRAINING PROGRAM | Admitting: STUDENT IN AN ORGANIZED HEALTH CARE EDUCATION/TRAINING PROGRAM
Payer: MEDICARE

## 2022-07-01 VITALS
BODY MASS INDEX: 24.34 KG/M2 | DIASTOLIC BLOOD PRESSURE: 76 MMHG | SYSTOLIC BLOOD PRESSURE: 127 MMHG | OXYGEN SATURATION: 96 % | RESPIRATION RATE: 16 BRPM | HEART RATE: 69 BPM | HEIGHT: 70 IN | TEMPERATURE: 98 F | WEIGHT: 170 LBS

## 2022-07-01 DIAGNOSIS — M54.16 LUMBAR RADICULOPATHY: Primary | ICD-10-CM

## 2022-07-01 PROCEDURE — 64483 NJX AA&/STRD TFRM EPI L/S 1: CPT | Mod: LT | Performed by: STUDENT IN AN ORGANIZED HEALTH CARE EDUCATION/TRAINING PROGRAM

## 2022-07-01 PROCEDURE — 64483 PR EPIDURAL INJ, ANES/STEROID, TRANSFORAMINAL, LUMB/SACR, SNGL LEVL: ICD-10-PCS | Mod: LT,,, | Performed by: STUDENT IN AN ORGANIZED HEALTH CARE EDUCATION/TRAINING PROGRAM

## 2022-07-01 PROCEDURE — 63600175 PHARM REV CODE 636 W HCPCS: Performed by: STUDENT IN AN ORGANIZED HEALTH CARE EDUCATION/TRAINING PROGRAM

## 2022-07-01 PROCEDURE — 25500020 PHARM REV CODE 255: Performed by: STUDENT IN AN ORGANIZED HEALTH CARE EDUCATION/TRAINING PROGRAM

## 2022-07-01 PROCEDURE — 99152 MOD SED SAME PHYS/QHP 5/>YRS: CPT | Performed by: STUDENT IN AN ORGANIZED HEALTH CARE EDUCATION/TRAINING PROGRAM

## 2022-07-01 PROCEDURE — 25000003 PHARM REV CODE 250: Performed by: STUDENT IN AN ORGANIZED HEALTH CARE EDUCATION/TRAINING PROGRAM

## 2022-07-01 PROCEDURE — 64483 NJX AA&/STRD TFRM EPI L/S 1: CPT | Mod: LT,,, | Performed by: STUDENT IN AN ORGANIZED HEALTH CARE EDUCATION/TRAINING PROGRAM

## 2022-07-01 RX ORDER — LIDOCAINE HYDROCHLORIDE 10 MG/ML
INJECTION, SOLUTION EPIDURAL; INFILTRATION; INTRACAUDAL; PERINEURAL
Status: DISCONTINUED | OUTPATIENT
Start: 2022-07-01 | End: 2022-07-01 | Stop reason: HOSPADM

## 2022-07-01 RX ORDER — FENTANYL CITRATE 50 UG/ML
25 INJECTION, SOLUTION INTRAMUSCULAR; INTRAVENOUS ONCE AS NEEDED
Status: COMPLETED | OUTPATIENT
Start: 2022-07-01 | End: 2022-07-01

## 2022-07-01 RX ORDER — BUPIVACAINE HYDROCHLORIDE 2.5 MG/ML
INJECTION, SOLUTION EPIDURAL; INFILTRATION; INTRACAUDAL
Status: DISCONTINUED | OUTPATIENT
Start: 2022-07-01 | End: 2022-07-01 | Stop reason: HOSPADM

## 2022-07-01 RX ORDER — DEXAMETHASONE SODIUM PHOSPHATE 4 MG/ML
INJECTION, SOLUTION INTRA-ARTICULAR; INTRALESIONAL; INTRAMUSCULAR; INTRAVENOUS; SOFT TISSUE
Status: DISCONTINUED | OUTPATIENT
Start: 2022-07-01 | End: 2022-07-01 | Stop reason: HOSPADM

## 2022-07-01 RX ORDER — SODIUM CHLORIDE 9 MG/ML
500 INJECTION, SOLUTION INTRAVENOUS CONTINUOUS
Status: DISCONTINUED | OUTPATIENT
Start: 2022-07-01 | End: 2022-07-01 | Stop reason: HOSPADM

## 2022-07-01 RX ORDER — MIDAZOLAM HYDROCHLORIDE 1 MG/ML
2 INJECTION INTRAMUSCULAR; INTRAVENOUS ONCE AS NEEDED
Status: COMPLETED | OUTPATIENT
Start: 2022-07-01 | End: 2022-07-01

## 2022-07-01 RX ORDER — IBUPROFEN 200 MG
200 TABLET ORAL EVERY 6 HOURS PRN
COMMUNITY

## 2022-07-01 NOTE — OP NOTE
"PROCEDURE: left Lumbar L3-4 Transforaminal Epidural Steroid Injection    Patient Name: Dariusz Urbina  MRN: [id number]    PROCEDURE DATE: 7/1/2022    INJECTION # 1    DIAGNOSIS: Lumbar Radiculopathy  CPT CODE: 04786 (INJECTION(S), ANESTHETIC AGENT(S) AND/OR STEROID; TRANSFORAMINAL EPIDURAL, WITH IMAGING GUIDANCE (FLUOROSCOPY OR CT), LUMBAR OR SACRAL, SINGLE LEVEL), 22748 (Each additional level).     POSTPROCEDURE DIAGNOSIS: Same    PHYSICIAN: Mario Lamb DO  NEEDLE TYPE: - 22G 3.5" Spinal Needle  MEDICATIONS INJECTED: 4ml mixture of 2.5ml Dexamethasone 4mg/ml and 1.5ml 0.25% bupivacaine split equally between each site.  CONTRAST: Omni 300    Sedation Medications - Mild Sedation with 2mg Versed and 25mcg Fentanyl    Estimated Blood Loss - <2ml  Drains: None  Specimens Removed: None  Urine Output - Not Measured  Complications: None  Outcome: Good    Informed Consent:  The patient's condition and proposed procedures, risks, and alternatives were discussed with the patient or responsible party.  The patient's / responsible party's questions were answered.   The patient / responsible party appeared to understand and chose to proceed.  Informed consent was obtained.  After obtaining written consent, an IV hep lock was placed. (See nurses notes for details).     Procedure in Detail:  The patient was taken back to the OR suite and placed in a prone position. The skin overlying the injection site was prepped and draped in an aseptic fashion. The target injection site (see above) was identified with fluoroscopy.     Procedural Pause:  A procedural pause verifying correct patient, medical record number, allergies, medications to be administered, current vital signs, and surgical site was performed immediately prior to beginning the procedure.    The skin and subcutaneous tissue overlying the target site(s) of injection for the L3-4 transforaminal epidural steroid injection was/were anesthetized using 4 mL of 1% " lidocaine with a 25-gauge, 1½-inch needle.      The fluoroscopic beam was aligned to create a tunnel view.  The above noted needle was advanced parallel to the fluoroscopic beam towards the above noted foramen under fluoroscopic guidance.  The final position of the needle(s) was identified using AP and lateral views.  Paresthesias were not noted with final needle positioning.       A microbore extension tubing was attached to the needle to minimize any movement of the needle during injection or syringe change.  After negative aspiration for heme or CSF at each site(s) where the needle(s) was placed, 1ml of contrast dye was injected to confirm appropriate placement and that there was no vascular uptake.  Pain provocation by the injected contrast material was not noted.  Then the injectate solution described above was injected in increments.  The needle was then retracted approximately care home and the needle track was flushed with 0.5 mL of Lidocaine 1%.  The needle(s) was then removed.     The same procedural technique outlined above was not repeated on the OPPOSITE side.    The heart rate, pulse oximetry, and blood pressure were continuously monitored throughout the procedure.  The procedure was well tolerated. He was carefully escorted to the recovery room in stable condition. Patient was monitored by RN for recovery period.  The patient will be contacted in the next few days to determine extent of relief.  Patient was given post procedure and discharge instructions to follow at home.  The patient was discharged in a stable condition.    Note Electronically Signed By:  Mario Lamb  07/01/2022

## 2022-07-01 NOTE — PLAN OF CARE
VSS.  Patient tolerating oral liquids without difficulty.   No apparent s&s of distress noted at this time, no complaints voiced at this time.   Discharge instructions reviewed with patient and wife with good verbal feedback received.   No post op medications ordered.   Patient ready for discharge.

## 2022-07-01 NOTE — PLAN OF CARE
PRE OP CHECKLIST COMPLETE. PT RESTING IN BED WITH CALL LIGHT IN REACH. PT BELONGINGS AT BEDSIDE AND SENT WITH WIFE. WIFE BROUGHT TO BEDSIDE.

## 2022-07-01 NOTE — DISCHARGE SUMMARY
[facility] (Hospital)  Discharge Note  Short Stay    Procedure(s) (LRB):  TFESI Left  L3-4 (Left)    OUTCOME: Patient tolerated treatment/procedure well without complication and is now ready for discharge.    DISPOSITION: Home or Self Care    FINAL DIAGNOSIS:  <principal problem not specified>    FOLLOWUP: In clinic    DISCHARGE INSTRUCTIONS:  No discharge procedures on file.     TIME SPENT ON DISCHARGE: 10 minutes

## 2022-07-01 NOTE — PATIENT INSTRUCTIONS
[facility]  [facility]  [employer] service # [phone]    POST-PROCEDURE INSTRUCTIONS:    Today you had an injection that included a steroid medications.  The steroid may or may not have been mixed with a local anesthetic when it was injected.   If the injection was in the neck, you may feel some pressure, numbness, or slight weakness in the arm after the procedure for a short period of time (this is a normal response), if this persists for longer than 1 day please contact our office or go to the emergency room.  If the injection was in the low back, you may feel some pressure, numbness, or slight weakness in the leg after the procedure for a short period of time (this is a normal response), if this persists for longer than 1 day please contact our office or go to the emergency room.  You may get side effects from the steroid.  This is not uncommon.  Symptoms include: elevated blood sugar, elevated blood pressure, headache, flushing, nausea, insomnia.  These symptoms are transient and will resolve within 1-3 days.  If symptoms last longer than this please contact our office or head to the emergency room.  Steroid medications can take anywhere from 3-14 days to take effect (rarely longer).  You may notice that your pain worsens for a short period of time after the injection, this would not be unusual due to the pressure and trauma from the needle.    If you do not have a follow up appointment scheduled, please contact our office to get a post-procedure follow up scheduled 2-3 weeks after the procedure.  This can be done as a virtual visit if that is more convenient for you.    What you need to do:    Keep a record of your response to the injection you had today.    How much relief did you get?   When did the relief start and how long did it last?  Were you able to decrease the use of any of your pain medications?  Were you able to increase your level of activity?  How long did  the relief last?    What to watch out for:    If you experience any of the following symptoms after your procedure, please notify the messaging service immediately (see above for contact information):   fever (increased oral temperature)   bleeding or swelling at the injection site,    drainage, rash or redness at the injection site    possible signs of infection    increased pain at the injection site   worsening of your usual pain   severe headache   new or worsening numbness    new arm and/or leg weakness, or    changes in bowel and/or bladder function: urinating or defecating on yourself and not knowing that you did it.    PLEASE FOLLOW ALL INSTRUCTIONS CAREFULLY     Do not engage in strenuous activity (e.g., lifting or pushing heavy objects or repeated bending) for 24 hours.     Do not take a bath, swim or use Jacuzzi for 24 hours after procedure. (A shower is fine).   Remove any Band-Aids when you get home.    Use cold/ice, as needed for comfort.  We recommend the use of cold therapy alternating on for 20 minutes, off for 20 minutes.    Do not apply direct heat (heating pad or heat packs) to the injection site for 24 hours.     Resume your usual medications, unless instructed otherwise by your Pain Physician.     If you are on warfarin (Coumadin) or other blood thinner, resume this medication as instructed by your prescribing Physician.    IF AT ANY POINT YOU ARE VERY CONCERNED ABOUT YOUR SYMPTOMS, PLEASE GO TO THE EMERGENCY ROOM.    If you develop worsening pain, weakness, numbness, lose bowel or bladder control (i.e., having an accident where you did not even know you had to go to the bathroom and suddenly noticed you soiled yourself), saddle anesthesia (a loss of sensation restricted to the area of the buttocks, anus and between the legs -- i.e., those parts of your body that would touch a saddle if you were sitting on one) you need to go immediately to the emergency department for evaluation and  treatment.    ----------------------------------------------------------------------------------------------------------------------------------------------------------------  If you received Sedation please read the following instructions:  POST SEDATION INSTRUCTIONS    Today you received intravenous medication (also known as sedation) that was used to help you relax and/or decrease discomfort during your procedure. This medication will be acting in your body for the next 24 hours, so you might feel a little tired or sleepy. This feeling will slowly wear off.   Common side effects associated with these medications include: drowsiness, dizziness, sleepiness, confusion, feeling excited, difficulty remembering things, lack of steadiness with walking or balance, loss of fine muscle control, slowed reflexes, difficulty focusing, and blurred vision.  Some over-the-counter and prescription medications (e.g., muscle relaxants, opioids, mood-altering medications, sedatives/hypnotics, antihistamines) can interact with the intravenous medication you received and cause an increased risk of the side effects listed above in addition to other potentially life threatening side effects. Use extreme caution if you are taking such medications, and consult with your Pain Physician or prescribing physician if you have any questions.  For the next 12-24 hours:    DO NOT--Drive a car, operate machinery or power tools   DO NOT--Drink any alcoholic beverages (not even beer), they may dangerously increase the risk of side effects.    DO NOT--Make any important legal or business decisions or sign important documents.  We advise you to have someone to assist you at home. Move slowly and carefully. Do not make sudden changes in position. Be aware of dizziness or light-headedness and move accordingly.   If you seek medical treatment within 24 hours, let the nurse or doctor caring for you know that you have received the above medications. If you  have any questions or concerns related to your sedation or treatment today please contact us.

## 2022-07-05 ENCOUNTER — OFFICE VISIT (OUTPATIENT)
Dept: FAMILY MEDICINE | Facility: CLINIC | Age: 67
End: 2022-07-05
Payer: MEDICARE

## 2022-07-05 VITALS
SYSTOLIC BLOOD PRESSURE: 138 MMHG | HEART RATE: 72 BPM | BODY MASS INDEX: 25.7 KG/M2 | HEIGHT: 69 IN | TEMPERATURE: 98 F | WEIGHT: 173.5 LBS | DIASTOLIC BLOOD PRESSURE: 70 MMHG | OXYGEN SATURATION: 95 %

## 2022-07-05 DIAGNOSIS — I10 PRIMARY HYPERTENSION: ICD-10-CM

## 2022-07-05 DIAGNOSIS — Z00.00 ROUTINE MEDICAL EXAM: Primary | ICD-10-CM

## 2022-07-05 DIAGNOSIS — E78.5 HYPERLIPIDEMIA, UNSPECIFIED HYPERLIPIDEMIA TYPE: ICD-10-CM

## 2022-07-05 DIAGNOSIS — M54.16 LUMBAR RADICULOPATHY: ICD-10-CM

## 2022-07-05 DIAGNOSIS — N40.0 BENIGN PROSTATIC HYPERPLASIA, UNSPECIFIED WHETHER LOWER URINARY TRACT SYMPTOMS PRESENT: ICD-10-CM

## 2022-07-05 DIAGNOSIS — E11.9 CONTROLLED TYPE 2 DIABETES MELLITUS WITHOUT COMPLICATION, WITHOUT LONG-TERM CURRENT USE OF INSULIN: ICD-10-CM

## 2022-07-05 DIAGNOSIS — I70.0 ATHEROSCLEROSIS OF AORTA: ICD-10-CM

## 2022-07-05 DIAGNOSIS — A42.9 ACTINOMYCOSIS DUE TO ACTINOMYCES ODONTOLYTICUS: ICD-10-CM

## 2022-07-05 DIAGNOSIS — Z86.010 HISTORY OF COLONIC POLYPS: ICD-10-CM

## 2022-07-05 DIAGNOSIS — B49 FUNGAL INFECTION OF LUNG: ICD-10-CM

## 2022-07-05 DIAGNOSIS — Z12.5 SCREENING FOR PROSTATE CANCER: ICD-10-CM

## 2022-07-05 PROCEDURE — 1159F MED LIST DOCD IN RCRD: CPT | Mod: CPTII,S$GLB,, | Performed by: INTERNAL MEDICINE

## 2022-07-05 PROCEDURE — 4010F ACE/ARB THERAPY RXD/TAKEN: CPT | Mod: CPTII,S$GLB,, | Performed by: INTERNAL MEDICINE

## 2022-07-05 PROCEDURE — 1159F PR MEDICATION LIST DOCUMENTED IN MEDICAL RECORD: ICD-10-PCS | Mod: CPTII,S$GLB,, | Performed by: INTERNAL MEDICINE

## 2022-07-05 PROCEDURE — 3008F PR BODY MASS INDEX (BMI) DOCUMENTED: ICD-10-PCS | Mod: CPTII,S$GLB,, | Performed by: INTERNAL MEDICINE

## 2022-07-05 PROCEDURE — 99999 PR PBB SHADOW E&M-EST. PATIENT-LVL III: CPT | Mod: PBBFAC,,, | Performed by: INTERNAL MEDICINE

## 2022-07-05 PROCEDURE — 99397 PR PREVENTIVE VISIT,EST,65 & OVER: ICD-10-PCS | Mod: 25,S$GLB,, | Performed by: INTERNAL MEDICINE

## 2022-07-05 PROCEDURE — 3075F SYST BP GE 130 - 139MM HG: CPT | Mod: CPTII,S$GLB,, | Performed by: INTERNAL MEDICINE

## 2022-07-05 PROCEDURE — 99999 PR PBB SHADOW E&M-EST. PATIENT-LVL III: ICD-10-PCS | Mod: PBBFAC,,, | Performed by: INTERNAL MEDICINE

## 2022-07-05 PROCEDURE — 3075F PR MOST RECENT SYSTOLIC BLOOD PRESS GE 130-139MM HG: ICD-10-PCS | Mod: CPTII,S$GLB,, | Performed by: INTERNAL MEDICINE

## 2022-07-05 PROCEDURE — 99214 PR OFFICE/OUTPT VISIT, EST, LEVL IV, 30-39 MIN: ICD-10-PCS | Mod: 25,S$GLB,, | Performed by: INTERNAL MEDICINE

## 2022-07-05 PROCEDURE — 99214 OFFICE O/P EST MOD 30 MIN: CPT | Mod: 25,S$GLB,, | Performed by: INTERNAL MEDICINE

## 2022-07-05 PROCEDURE — 3078F PR MOST RECENT DIASTOLIC BLOOD PRESSURE < 80 MM HG: ICD-10-PCS | Mod: CPTII,S$GLB,, | Performed by: INTERNAL MEDICINE

## 2022-07-05 PROCEDURE — 4010F PR ACE/ARB THEARPY RXD/TAKEN: ICD-10-PCS | Mod: CPTII,S$GLB,, | Performed by: INTERNAL MEDICINE

## 2022-07-05 PROCEDURE — 3078F DIAST BP <80 MM HG: CPT | Mod: CPTII,S$GLB,, | Performed by: INTERNAL MEDICINE

## 2022-07-05 PROCEDURE — 99397 PER PM REEVAL EST PAT 65+ YR: CPT | Mod: 25,S$GLB,, | Performed by: INTERNAL MEDICINE

## 2022-07-05 PROCEDURE — 3008F BODY MASS INDEX DOCD: CPT | Mod: CPTII,S$GLB,, | Performed by: INTERNAL MEDICINE

## 2022-07-05 RX ORDER — VALSARTAN 160 MG/1
160 TABLET ORAL DAILY
Qty: 90 TABLET | Refills: 3 | Status: SHIPPED | OUTPATIENT
Start: 2022-07-05 | End: 2022-08-16 | Stop reason: SDUPTHER

## 2022-07-05 NOTE — PROGRESS NOTES
Chief complaint Physical  -    Patient is a 67  -year-old white male Here for physical.  2 normal colonoscopies , 2 POLYPS 6/ -5 yrs ..  PSA is due.        ROS:   CONST: weight stable. EYES: no vision change. ENT: no sore throat. CV: no chest pain w/ exertion. RESP: no shortness of breath. GI: no nausea, vomiting, diarrhea. No dysphagia. : no urinary issues. MUSCULOSKELETAL: no new myalgias or arthralgias. SKIN: no new changes. NEURO: no focal deficits. PSYCH: no new issues. ENDOCRINE: no polyuria. HEME: no lymph nodes. ALLERGY: no general pruritis.     PAST MEDICAL HISTORY:                                                        1.  Hyperlipidemia.                                                          2.  Back surgery for slipped disk, Dr. Sams.                            3.  History of ED, seen prior by Urology.                                    4.  History of suspicious stress test and atypical chest pain, normal cath  by Dr. Lugo, likely done at [facility].                            5.  Vasectomy.                                                               6.  Normal colonoscopy,  And , 2 POLYPS / -5 yrs                                              7.  Erosive gastropathy, outside EGD along with a normal emptying study.     8.  Slight anemia in , but did donate blood prior.  9.  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 right hip replacement- Dr Thomas  12.   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                                                                                                    FAMILY HISTORY:  Father  of lung cancer and coronary disease at 73.      Sister  of lung  cancer, mom  of breast cancer and she had diabetes.   A brother is apparently okay.                                                                                               Gen: no distress  EYES: conjunctiva clear, non-icteric, PERRL  ENT: nose clear, nasal mucosa normal, oropharynx clear and moist, teeth good  NECK:supple, thyroid non-palpable  RESP: effort is good, lungs clear  CV: heart RRR w/o murmur, gallops or rubs; no carotid bruits, no edema  MS: gait normal, no clubbing or cyanosis of the digits,   SKIN: No rashes, warm to touch    Dariusz was seen today for annual exam.    Diagnoses and all orders for this visit:    Routine medical exam  -     Hemoglobin A1C; Future  -     Comprehensive Metabolic Panel; Future  -     TSH; Future  -     Lipid Panel; Future  -     CBC Auto Differential; Future    Screening for prostate cancer  -     Prostate Specific Antigen, Diagnostic; Future    History of colonic polyps                                                              Additional evaluation and management issues:    Additionally patient has numerous other medical issues to address separate from his physical.      Apparently no further follow-up for his lung mass issue.  Reviewed interval pulmonary note.  Reviewed prior chest x-rays and CT scans.  His initial chest x-ray is not available as it was at [facility].    He did go on a diet also fasting one day per week and lost about some weight.  He does donate blood and may have done so a few weeks prior to the lab work where it shows he had a slight hemoglobin reduction and he does have an appointment to donate some blood today so we will expect hemoglobin reduction..  He is up-to-date on his colon screening.    Patient also stopped his Lipitor an aspirin.  We did discuss his A1c above 6.8 and his diabetic  for which a statin would be recommended so I would recommend he restart Pravachol and he request reducing the dose from 80 mg down to 40 and I think that  is reasonable.  Perhaps he was on the 80 in the past due to compliance issues.  Looks like his highest LDL was in the 150s and we would have to assume that was prior to treatment.  We discussed possible metformin.  We discussed looking up a diabetic diet on the Internet to make further adjustments but he is already probably on a very good diet.  His A1c did improved to 6.4          ID 3/18  Assessment:       1. 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 Fungal infection of lung    3. Lung mass    4. Lung nodules      Plan:       Continue fluconazole for another month - total 9 months  Continue Doxycycline until September  Repeat PFTs this summer.   Repeat CT in September.  Return in 6 months.        Patient also did some lifting and has a bulging disc.  He has been taking some ibuprofen otherwise the pain returns we discussed all the negative issues of ibuprofen, increasing blood pressure, ulcers, kidney dysfunction.  Will have him use Tylenol arthritis on a more regular basis and hopefully that will allow him to reduce the ibuprofen.      Pain mgmt  PLAN:      Left lumbar radiculopathy  -having more pain from the back down the left L3/4 pattern  -MRI shows left sided disc herniation at L3  -schedule Left L3-4 TFESI. Risks, benefits, and alternatives were discussed with the patient, and He would like to proceed.  -if TFESI not helpful, then will refer to spine surgery for evaluation.     left acute knee pain  -unclear etiology. Knee physical exam was normal. Could be coming from the back  -XR knee - DJD  -naproxen 500mg BID PRN  -discussed knee injection but unclear if would be significant benefit given odd presentation of pain. Patient defers  -he deferred a referral to sport medicine to evaluate     Back pain s/p prior fusion L4-5 with occasional L sided sciatica  -tizanidine 4mg qhs PRN for spasms at night to help him sleep      Also monitoring blood pressure at home and is never perfect.  He is on  valsartan 80 and we discussed increasing to 160, monitoring over a few weeks and making further adjustments discussing that more than one blood pressure medication may be needed.    Evaluation management of all the separate issues will be based on medical decision making as below                      Assessment and plan:           Hyperlipidemia, unspecified hyperlipidemia type, reassess on current statin  -     Lipid Panel; Future    Controlled type 2 diabetes mellitus without complication, without long-term current use of insulin, reassess  -     Hemoglobin A1C; Future  -     Comprehensive Metabolic Panel; Future  -     TSH; Future  -     Lipid Panel; Future  -     CBC Auto Differential; Future    Benign prostatic hyperplasia, unspecified whether lower urinary tract symptoms present, due for PSA  -     Prostate Specific Antigen, Diagnostic; Future    Atherosclerosis of aorta, noted    Fungal infection of lung, treated without any recurrent symptoms    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

## 2022-07-07 ENCOUNTER — LAB VISIT (OUTPATIENT)
Dept: LAB | Facility: HOSPITAL | Age: 67
End: 2022-07-07
Attending: INTERNAL MEDICINE
Payer: MEDICARE

## 2022-07-07 DIAGNOSIS — Z00.00 ROUTINE MEDICAL EXAM: ICD-10-CM

## 2022-07-07 DIAGNOSIS — Z12.5 SCREENING FOR PROSTATE CANCER: ICD-10-CM

## 2022-07-07 DIAGNOSIS — N40.0 BENIGN PROSTATIC HYPERPLASIA, UNSPECIFIED WHETHER LOWER URINARY TRACT SYMPTOMS PRESENT: ICD-10-CM

## 2022-07-07 DIAGNOSIS — E11.9 CONTROLLED TYPE 2 DIABETES MELLITUS WITHOUT COMPLICATION, WITHOUT LONG-TERM CURRENT USE OF INSULIN: ICD-10-CM

## 2022-07-07 DIAGNOSIS — E78.5 HYPERLIPIDEMIA, UNSPECIFIED HYPERLIPIDEMIA TYPE: ICD-10-CM

## 2022-07-07 LAB
ALBUMIN SERPL BCP-MCNC: 4.1 G/DL (ref 3.5–5.2)
ALP SERPL-CCNC: 60 U/L (ref 55–135)
ALT SERPL W/O P-5'-P-CCNC: 21 U/L (ref 10–44)
ANION GAP SERPL CALC-SCNC: 11 MMOL/L (ref 8–16)
AST SERPL-CCNC: 23 U/L (ref 10–40)
BASOPHILS # BLD AUTO: 0.06 K/UL (ref 0–0.2)
BASOPHILS NFR BLD: 0.8 % (ref 0–1.9)
BILIRUB SERPL-MCNC: 1.4 MG/DL (ref 0.1–1)
BUN SERPL-MCNC: 11 MG/DL (ref 8–23)
CALCIUM SERPL-MCNC: 9.9 MG/DL (ref 8.7–10.5)
CHLORIDE SERPL-SCNC: 102 MMOL/L (ref 95–110)
CHOLEST SERPL-MCNC: 211 MG/DL (ref 120–199)
CHOLEST/HDLC SERPL: 4.7 {RATIO} (ref 2–5)
CO2 SERPL-SCNC: 23 MMOL/L (ref 23–29)
COMPLEXED PSA SERPL-MCNC: 2.9 NG/ML (ref 0–4)
CREAT SERPL-MCNC: 0.9 MG/DL (ref 0.5–1.4)
DIFFERENTIAL METHOD: ABNORMAL
EOSINOPHIL # BLD AUTO: 0.3 K/UL (ref 0–0.5)
EOSINOPHIL NFR BLD: 3.1 % (ref 0–8)
ERYTHROCYTE [DISTWIDTH] IN BLOOD BY AUTOMATED COUNT: 13.5 % (ref 11.5–14.5)
EST. GFR  (AFRICAN AMERICAN): >60 ML/MIN/1.73 M^2
EST. GFR  (NON AFRICAN AMERICAN): >60 ML/MIN/1.73 M^2
GLUCOSE SERPL-MCNC: 129 MG/DL (ref 70–110)
HCT VFR BLD AUTO: 40.4 % (ref 40–54)
HDLC SERPL-MCNC: 45 MG/DL (ref 40–75)
HDLC SERPL: 21.3 % (ref 20–50)
HGB BLD-MCNC: 13.7 G/DL (ref 14–18)
IMM GRANULOCYTES # BLD AUTO: 0.03 K/UL (ref 0–0.04)
IMM GRANULOCYTES NFR BLD AUTO: 0.4 % (ref 0–0.5)
LDLC SERPL CALC-MCNC: 122.2 MG/DL (ref 63–159)
LYMPHOCYTES # BLD AUTO: 1.7 K/UL (ref 1–4.8)
LYMPHOCYTES NFR BLD: 21.1 % (ref 18–48)
MCH RBC QN AUTO: 29.1 PG (ref 27–31)
MCHC RBC AUTO-ENTMCNC: 33.9 G/DL (ref 32–36)
MCV RBC AUTO: 86 FL (ref 82–98)
MONOCYTES # BLD AUTO: 0.8 K/UL (ref 0.3–1)
MONOCYTES NFR BLD: 9.5 % (ref 4–15)
NEUTROPHILS # BLD AUTO: 5.2 K/UL (ref 1.8–7.7)
NEUTROPHILS NFR BLD: 65.1 % (ref 38–73)
NONHDLC SERPL-MCNC: 166 MG/DL
NRBC BLD-RTO: 0 /100 WBC
PLATELET # BLD AUTO: 383 K/UL (ref 150–450)
PMV BLD AUTO: 10 FL (ref 9.2–12.9)
POTASSIUM SERPL-SCNC: 4.3 MMOL/L (ref 3.5–5.1)
PROT SERPL-MCNC: 7.7 G/DL (ref 6–8.4)
RBC # BLD AUTO: 4.7 M/UL (ref 4.6–6.2)
SODIUM SERPL-SCNC: 136 MMOL/L (ref 136–145)
TRIGL SERPL-MCNC: 219 MG/DL (ref 30–150)
TSH SERPL DL<=0.005 MIU/L-ACNC: 3 UIU/ML (ref 0.4–4)
WBC # BLD AUTO: 7.98 K/UL (ref 3.9–12.7)

## 2022-07-07 PROCEDURE — 84153 ASSAY OF PSA TOTAL: CPT | Performed by: INTERNAL MEDICINE

## 2022-07-07 PROCEDURE — 80053 COMPREHEN METABOLIC PANEL: CPT | Performed by: INTERNAL MEDICINE

## 2022-07-07 PROCEDURE — 85025 COMPLETE CBC W/AUTO DIFF WBC: CPT | Performed by: INTERNAL MEDICINE

## 2022-07-07 PROCEDURE — 84443 ASSAY THYROID STIM HORMONE: CPT | Performed by: INTERNAL MEDICINE

## 2022-07-07 PROCEDURE — 36415 COLL VENOUS BLD VENIPUNCTURE: CPT | Mod: PO | Performed by: INTERNAL MEDICINE

## 2022-07-07 PROCEDURE — 80061 LIPID PANEL: CPT | Performed by: INTERNAL MEDICINE

## 2022-07-11 ENCOUNTER — PATIENT MESSAGE (OUTPATIENT)
Dept: FAMILY MEDICINE | Facility: CLINIC | Age: 67
End: 2022-07-11
Payer: MEDICARE

## 2022-07-11 NOTE — TELEPHONE ENCOUNTER
No new care gaps identified.  [facility] Embedded Care Gaps. Reference number: [id number]. 7/11/2022   11:45:30 AM CDT

## 2022-07-12 RX ORDER — FAMOTIDINE 20 MG/1
20 TABLET, FILM COATED ORAL 2 TIMES DAILY PRN
Qty: 180 TABLET | Refills: 12 | Status: SHIPPED | OUTPATIENT
Start: 2022-07-12 | End: 2022-08-16 | Stop reason: SDUPTHER

## 2022-07-15 ENCOUNTER — PATIENT MESSAGE (OUTPATIENT)
Dept: FAMILY MEDICINE | Facility: CLINIC | Age: 67
End: 2022-07-15
Payer: MEDICARE

## 2022-07-25 NOTE — H&P (VIEW-ONLY)
Chronic Pain - f/u    Referring Physician: Nils Espino MD    Date: 07/26/2022     Re: Dariusz Urbina  MR#: [id number]  YOB: 1955  Age: 67 y.o.    Chief Complaint:   No chief complaint on file.    **This note is dictated using the M*Modal Fluency Direct word recognition program. There are word recognition mistakes that are occasionally missed on review.**    ASSESSMENT: 67 y.o. year old male with knee and back pain, consistent with     1. Left lumbar radiculopathy  baclofen (LIORESAL) 10 MG tablet   2. Lumbar radiculopathy     3. Chronic pain of left knee     4. Post laminectomy syndrome           PLAN:     Left lumbar radiculopathy  -having more pain from the back down the left L3/4 pattern  -MRI shows left sided disc herniation at L3  -s/p Left L3-4 TFESI w/60% improvement @4w.  -schedule repeat Left L3-4 TFESI w/ RN sedation on 7/29 with eshraghi if available, or 8/12 with me  -consider refer to spine surgery for evaluation if does not resolve.    left knee pain - possible referral from the back  -unclear etiology. Knee physical exam was normal. Could be coming from the back   -XR knee - DJD  -continue ibuprofen 200mg BID-TID.   -discussed knee injection but unclear if would be significant benefit given odd presentation of pain. Patient defers  -he deferred a referral to sport medicine to evaluate    Back pain s/p prior fusion L4-5 with occasional L sided sciatica  -balofen 10mg qhs PRN for spasms at night to help him sleep    - RTC 4 weeks  - Counseled patient regarding the importance of activity modification.    The above plan and management options were discussed at length with patient. Patient is in agreement with the above and verbalized understanding. It will be communicated with the referring physician via electronic record, fax, or mail.  Lab/study reports reviewed were important and necessary because subsequent medical and treatment recommendations required review of the above lab/study  reports. Images viewed/reviewed above were important and necessary because subsequent medical and treatment recommendations required review of the reviewed image(s).     Electronically signed by:  Mario Lamb DO  07/26/2022    =========================================================================================================    SUBJECTIVE:    Interval History 7/25/2022:     Dariusz Urbina is a 67 y.o. male presents to the clinic for follow up.  Since last visit the pain has has moderately improved.  The patient's back pain is mostly resolved.  He still gets thigh/knee/and shin pain.  Has to take ibuprofen TID or else has more pain.  Feels like the injection helped with the back pain but the leg pain has persisted.    Does not get the electric shocks in his back anymore. Walking and riding his bike does not aggravate the pain.    The pain is located in the lower back area and radiates to the left knee.  The pain is described as aching    At BEST  1/10   At WORST  4/10 on the WORST day.    On average pain is rated as 1/10.   Today the pain is rated as 1/10  Symptoms interfere with daily activity, sleeping and work.   Exacerbating factors: movement.    Mitigating factors heat, ice, medications and stretching .     Current pain medications:  Ibuprofen 200mg 2-3x daily, hydrocodone 0.5 tabs qOD, tizanidine qhs  Failed Pain Medications:  baclofen    Interval History 6/28/2022:     Dariusz Urbina is a 67 y.o. male presents to the clinic for follow up.  Since last visit the pain has is unchanged. The pain is located in the left back and radiation to the left leg/knee.  States that it continues to bother him, and he would like to get something done ASAP.    The pain is located in the lower back area and radiates to the left knee.  The pain is described as aching, dull and shooting    At BEST  1/10   At WORST  9/10 on the WORST day.    On average pain is rated as 5/10.   Today the pain is rated as  "2/10  Symptoms interfere with daily activity and sleeping.   Exacerbating factors: nothing specific.    Mitigating factors ice and medications.     Current pain medications:  Ibuprofen 2-3x daily, hydrocodone 0.5 tabs qOD, tizanidine qhs  Failed Pain Medications:  baclofen    Pain procedures: epidural injections in the past before his prior surgery.  Physical Therapy: after hip replacement  Spinal decompression: hx of L4-5 fusion    Initial hx:  Dariusz Urbina is a 67 y.o. male presents to the clinic for the evaluation of lower back pain. The pain started 3 weeks ago following no inciting event and symptoms have been worsening.  He has left knee pain that started after he was in [place] and was getting a massage.  A therapist decided to do a "sciatic stretch" on him and then that afternoon the knee started hurting very badly.  The pain is on the medial and lateral joint line along with the quad tendon. It is worse at night, but does not bother him so much during the day.    Pain Description:    The pain is located in the lower back area and radiates to the left knee.    At BEST  3/10   At WORST  9/10 on the WORST day.    On average pain is rated as 5/10.   Today the pain is rated as 2/10  The pain is continuous.  The pain is described as aching and dull    Symptoms interfere with daily activity, sleeping and work.   Exacerbating factors: Standing, Bending, Walking, Extension, Flexing, Lifting and Getting out of bed/chair.    Mitigating factors heat, ice, medications and rest.   He reports 7 hours of sleep per night.    Physical Therapy/Home Exercise: No, not currently in physical therapy or home exercise program    Current Pain Medications:    - ibuprofen, gabapentin (ran out)    Failed Pain Medications:    - baclofen    Pain Treatment Therapies:    Pain procedures: epidural injections  Physical Therapy: after hip replacement  Spinal decompression: hx of L4-5 fusion  Joint replacement: right hip replacement "     Patient denies urinary incontinence, bowel incontinence, significant motor weakness and loss of sensations.  Patient denies any suicidal or homicidal ideations     report:  Not applicable    Imaging:   N/A    Past Medical History:   Diagnosis Date    Decreased libido 2013    Dermatitis 10/12/2012    Hyperlipidemia 2013    Hypertension      Past Surgical History:   Procedure Laterality Date    COLONOSCOPY      COLONOSCOPY N/A 2022    Procedure: COLONOSCOPY;  Surgeon: Omaira Mcclelland MD;  Location: [facility];  Service: Endoscopy;  Laterality: N/A;  fully vaccinated  instructions emailed   [facility] provider from Dr. Alvarez to Dr. Mcclelland---    HIP REPLACEMENT ARTHROPLASTY  2019    LUMBAR FUSION      titanium rods    TONSILLECTOMY      TRANSFORAMINAL EPIDURAL INJECTION OF STEROID Left 2022    Procedure: TFESI Left  L3-4;  Surgeon: Mario Lamb DO;  Location: [facility];  Service: Pain Management;  Laterality: Left;     Social History     Socioeconomic History    Marital status:    Tobacco Use    Smoking status: Former Smoker     Packs/day: 1.00     Years: 10.00     Pack years: 10.00     Types: Cigarettes     Quit date: 1989     Years since quittin.0    Smokeless tobacco: Never Used   Substance and Sexual Activity    Alcohol use: No     Comment: RARELY    Drug use: Not Currently     Social Determinants of Health     Financial Resource Strain: Low Risk     Difficulty of Paying Living Expenses: Not hard at all   Food Insecurity: No Food Insecurity    Worried About Running Out of Food in the Last Year: Never true    Ran Out of Food in the Last Year: Never true   Transportation Needs: No Transportation Needs    Lack of Transportation (Medical): No    Lack of Transportation (Non-Medical): No   Physical Activity: Sufficiently Active    Days of Exercise per Week: 3 days    Minutes of Exercise per Session: 60 min   Stress: No Stress Concern Present     Feeling of Stress : Not at all   Social Connections: Unknown    Frequency of Communication with Friends and Family: More than three times a week    Frequency of Social Gatherings with Friends and Family: More than three times a week    Active Member of Clubs or Organizations: Yes    Attends Club or Organization Meetings: More than 4 times per year    Marital Status:    Housing Stability: Low Risk     Unable to Pay for Housing in the Last Year: No    Number of Places Lived in the Last Year: 1    Unstable Housing in the Last Year: No     No family history on file.    Review of patient's allergies indicates:  No Known Allergies    Current Outpatient Medications   Medication Sig    ascorbic acid, vitamin C, (VITAMIN C) 1000 MG tablet Take 1,000 mg by mouth.    cyanocobalamin (VITAMIN B-12) 1000 MCG tablet Take 100 mcg by mouth.    diclofenac sodium (VOLTAREN) 1 % Gel Apply 2 g topically 4 (four) times daily.    famotidine (PEPCID) 20 MG tablet Take 1 tablet (20 mg total) by mouth 2 (two) times daily as needed for Heartburn.    ibuprofen (ADVIL,MOTRIN) 200 MG tablet Take 200 mg by mouth every 6 (six) hours as needed for Pain.    pravastatin (PRAVACHOL) 40 MG tablet TAKE 1 TABLET(40 MG) BY MOUTH EVERY DAY    valsartan (DIOVAN) 160 MG tablet Take 1 tablet (160 mg total) by mouth once daily.    vitamin D (VITAMIN D3) 1000 units Tab Take 1,000 Units by mouth.    baclofen (LIORESAL) 10 MG tablet Take 1 tablet (10 mg total) by mouth 3 (three) times daily as needed (pain / spasms).    naproxen (NAPROSYN) 500 MG tablet Take 1 tablet (500 mg total) by mouth 2 (two) times daily with meals.     No current facility-administered medications for this visit.       REVIEW OF SYSTEMS:    GENERAL:  No weight loss, malaise or fevers.  HEENT:   No recent changes in vision or hearing  NECK:  Negative for lumps, no difficulty with swallowing.  RESPIRATORY:  Negative for cough, wheezing or shortness of breath,  "patient denies any recent URI.  CARDIOVASCULAR:  Negative for chest pain, leg swelling or palpitations.  GI:  Negative for abdominal discomfort, blood in stools or black stools or change in bowel habits.  MUSCULOSKELETAL:  See HPI.  SKIN:  Negative for lesions, rash, and itching.  PSYCH:  No mood disorder or recent psychosocial stressors.  Patients sleep is not disturbed secondary to pain.  HEMATOLOGY/LYMPHOLOGY:  Negative for prolonged bleeding, bruising easily or swollen nodes.  Patient is not currently taking any anti-coagulants  NEURO:   No history of headaches, syncope, paralysis, seizures or tremors.  All other reviewed and negative other than HPI.    OBJECTIVE:    BP (!) 153/80   Pulse 74   Resp 18   Ht 5' 9" (1.753 m)   Wt 78.5 kg (173 lb)   BMI 25.55 kg/m²     PHYSICAL EXAMINATION:    GENERAL: Well appearing, in no acute distress, alert and oriented x3.  PSYCH:  Mood and affect appropriate.  SKIN: Skin color, texture, turgor normal, no rashes or lesions.  HEAD/FACE:  Normocephalic, atraumatic. Cranial nerves grossly intact.  CV: RRR with palpation of the radial artery.  PULM: CTAB. No evidence of respiratory difficulty, symmetric chest rise.  GI:  Soft and non-tender.    BACK:   - No obvious deformity or signs of trauma, Normal lumbar lordotic curve  - Negative spinous process tenderness  - Negative paravertebral tenderness  - Negative pain to palpation over the facet joints of the lumbar spine.   - Negative QL / Iliac crest / Glut tenderness  - Slump test is Negative for radicular pain  - Slump test is Negative for back pain  - Supine Straight leg raising is Negative for radicular pain  - Supine Straight leg raising is Negative for back pain  - Lumbar ROM is normal in Flexion without pain  - Lumbar ROM is normal in Extension without pain  - Lumbar ROM is normal in Lateral Flexion without pain  - Lumbar ROM is normal in Rotation without pain  - Negative Sustained Hip Flexion test (for discogenic " pain)  - Negative Altered Gait, Posture  - Axial facet loading test Negative on the bilateral side(s)    SI Joint exam:  - Negative SI joint tenderness to palpation  - Herb's sign Negative  - Yeoman's Test: Did not perform for SI joint pain indicating anterior SI ligament involvement. Negative for anterior thigh pain/paresthesia which indicates femoral nerve stretch.  - Gaenslen's Test:Negative  - Finger Alexandra's Sign:Negative  - SI compression test:Did not perform  - SI distraction test:Did not perform  - Thigh Thrust: Did not perform  - SI Thrust: Did not perform    MUSKULOSKELETAL:    EXTREMITIES:   Hip Exam:  - Log Roll Negative  - FADIR Negative  - Stinchfield Negative  - Hip Scour Negative  - GTB Tenderness Negative     Knee exam:  -No TTP to medial or lateral joint line  -No erythema  -no edema  (-) anterior and posterior drawer  (-) elvira  (-) pain with squat  (-) pain with varus/valgus stress    MUSCULOSKELETAL:  No atrophy or tone abnormalities are noted in the UE or LE.  No deformities, edema, or skin discoloration are noted on visible skin. Good capillary refill.    NEURO: Bilateral upper and lower extremity coordination and muscle stretch reflexes are physiologic and symmetric.      NEUROLOGICAL EXAM:  MENTAL STATUS: A x O x 3, good concentration, speech is fluent and goal directed  MEMORY: recent and remote are intact  CN: CN2-12 grossly intact  MOTOR: 5/5 in all muscle groups  DTRs: 0 symmetric  Sensation:    -no Loss of sensation in a left lower and right lower L-1, L-2, L-3, L-4 and L-5 bilaterally distribution.    GAIT: normal.

## 2022-07-25 NOTE — PROGRESS NOTES
Chronic Pain - f/u    Referring Physician: Nils Espino MD    Date: 07/26/2022     Re: Dariusz Urbina  MR#: [id number]  YOB: 1955  Age: 67 y.o.    Chief Complaint:   No chief complaint on file.    **This note is dictated using the M*Modal Fluency Direct word recognition program. There are word recognition mistakes that are occasionally missed on review.**    ASSESSMENT: 67 y.o. year old male with knee and back pain, consistent with     1. Left lumbar radiculopathy  baclofen (LIORESAL) 10 MG tablet   2. Lumbar radiculopathy     3. Chronic pain of left knee     4. Post laminectomy syndrome           PLAN:     Left lumbar radiculopathy  -having more pain from the back down the left L3/4 pattern  -MRI shows left sided disc herniation at L3  -s/p Left L3-4 TFESI w/60% improvement @4w.  -schedule repeat Left L3-4 TFESI w/ RN sedation on 7/29 with eshraghi if available, or 8/12 with me  -consider refer to spine surgery for evaluation if does not resolve.    left knee pain - possible referral from the back  -unclear etiology. Knee physical exam was normal. Could be coming from the back   -XR knee - DJD  -continue ibuprofen 200mg BID-TID.   -discussed knee injection but unclear if would be significant benefit given odd presentation of pain. Patient defers  -he deferred a referral to sport medicine to evaluate    Back pain s/p prior fusion L4-5 with occasional L sided sciatica  -balofen 10mg qhs PRN for spasms at night to help him sleep    - RTC 4 weeks  - Counseled patient regarding the importance of activity modification.    The above plan and management options were discussed at length with patient. Patient is in agreement with the above and verbalized understanding. It will be communicated with the referring physician via electronic record, fax, or mail.  Lab/study reports reviewed were important and necessary because subsequent medical and treatment recommendations required review of the above lab/study  reports. Images viewed/reviewed above were important and necessary because subsequent medical and treatment recommendations required review of the reviewed image(s).     Electronically signed by:  Mario Lamb DO  07/26/2022    =========================================================================================================    SUBJECTIVE:    Interval History 7/25/2022:     Dariusz Urbina is a 67 y.o. male presents to the clinic for follow up.  Since last visit the pain has has moderately improved.  The patient's back pain is mostly resolved.  He still gets thigh/knee/and shin pain.  Has to take ibuprofen TID or else has more pain.  Feels like the injection helped with the back pain but the leg pain has persisted.    Does not get the electric shocks in his back anymore. Walking and riding his bike does not aggravate the pain.    The pain is located in the lower back area and radiates to the left knee.  The pain is described as aching    At BEST  1/10   At WORST  4/10 on the WORST day.    On average pain is rated as 1/10.   Today the pain is rated as 1/10  Symptoms interfere with daily activity, sleeping and work.   Exacerbating factors: movement.    Mitigating factors heat, ice, medications and stretching .     Current pain medications:  Ibuprofen 200mg 2-3x daily, hydrocodone 0.5 tabs qOD, tizanidine qhs  Failed Pain Medications:  baclofen    Interval History 6/28/2022:     Dariusz Urbina is a 67 y.o. male presents to the clinic for follow up.  Since last visit the pain has is unchanged. The pain is located in the left back and radiation to the left leg/knee.  States that it continues to bother him, and he would like to get something done ASAP.    The pain is located in the lower back area and radiates to the left knee.  The pain is described as aching, dull and shooting    At BEST  1/10   At WORST  9/10 on the WORST day.    On average pain is rated as 5/10.   Today the pain is rated as  "2/10  Symptoms interfere with daily activity and sleeping.   Exacerbating factors: nothing specific.    Mitigating factors ice and medications.     Current pain medications:  Ibuprofen 2-3x daily, hydrocodone 0.5 tabs qOD, tizanidine qhs  Failed Pain Medications:  baclofen    Pain procedures: epidural injections in the past before his prior surgery.  Physical Therapy: after hip replacement  Spinal decompression: hx of L4-5 fusion    Initial hx:  Dariusz Urbina is a 67 y.o. male presents to the clinic for the evaluation of lower back pain. The pain started 3 weeks ago following no inciting event and symptoms have been worsening.  He has left knee pain that started after he was in [place] and was getting a massage.  A therapist decided to do a "sciatic stretch" on him and then that afternoon the knee started hurting very badly.  The pain is on the medial and lateral joint line along with the quad tendon. It is worse at night, but does not bother him so much during the day.    Pain Description:    The pain is located in the lower back area and radiates to the left knee.    At BEST  3/10   At WORST  9/10 on the WORST day.    On average pain is rated as 5/10.   Today the pain is rated as 2/10  The pain is continuous.  The pain is described as aching and dull    Symptoms interfere with daily activity, sleeping and work.   Exacerbating factors: Standing, Bending, Walking, Extension, Flexing, Lifting and Getting out of bed/chair.    Mitigating factors heat, ice, medications and rest.   He reports 7 hours of sleep per night.    Physical Therapy/Home Exercise: No, not currently in physical therapy or home exercise program    Current Pain Medications:    - ibuprofen, gabapentin (ran out)    Failed Pain Medications:    - baclofen    Pain Treatment Therapies:    Pain procedures: epidural injections  Physical Therapy: after hip replacement  Spinal decompression: hx of L4-5 fusion  Joint replacement: right hip replacement "     Patient denies urinary incontinence, bowel incontinence, significant motor weakness and loss of sensations.  Patient denies any suicidal or homicidal ideations     report:  Not applicable    Imaging:   N/A    Past Medical History:   Diagnosis Date    Decreased libido 2013    Dermatitis 10/12/2012    Hyperlipidemia 2013    Hypertension      Past Surgical History:   Procedure Laterality Date    COLONOSCOPY      COLONOSCOPY N/A 2022    Procedure: COLONOSCOPY;  Surgeon: Omaira Mcclelland MD;  Location: [facility];  Service: Endoscopy;  Laterality: N/A;  fully vaccinated  instructions emailed   [facility] provider from Dr. Alvarez to Dr. Mcclelland---    HIP REPLACEMENT ARTHROPLASTY  2019    LUMBAR FUSION      titanium rods    TONSILLECTOMY      TRANSFORAMINAL EPIDURAL INJECTION OF STEROID Left 2022    Procedure: TFESI Left  L3-4;  Surgeon: Mario Lamb DO;  Location: [facility] OR;  Service: Pain Management;  Laterality: Left;     Social History     Socioeconomic History    Marital status:    Tobacco Use    Smoking status: Former Smoker     Packs/day: 1.00     Years: 10.00     Pack years: 10.00     Types: Cigarettes     Quit date: 1989     Years since quittin.0    Smokeless tobacco: Never Used   Substance and Sexual Activity    Alcohol use: No     Comment: RARELY    Drug use: Not Currently     Social Determinants of Health     Financial Resource Strain: Low Risk     Difficulty of Paying Living Expenses: Not hard at all   Food Insecurity: No Food Insecurity    Worried About Running Out of Food in the Last Year: Never true    Ran Out of Food in the Last Year: Never true   Transportation Needs: No Transportation Needs    Lack of Transportation (Medical): No    Lack of Transportation (Non-Medical): No   Physical Activity: Sufficiently Active    Days of Exercise per Week: 3 days    Minutes of Exercise per Session: 60 min   Stress: No Stress Concern Present     Feeling of Stress : Not at all   Social Connections: Unknown    Frequency of Communication with Friends and Family: More than three times a week    Frequency of Social Gatherings with Friends and Family: More than three times a week    Active Member of Clubs or Organizations: Yes    Attends Club or Organization Meetings: More than 4 times per year    Marital Status:    Housing Stability: Low Risk     Unable to Pay for Housing in the Last Year: No    Number of Places Lived in the Last Year: 1    Unstable Housing in the Last Year: No     No family history on file.    Review of patient's allergies indicates:  No Known Allergies    Current Outpatient Medications   Medication Sig    ascorbic acid, vitamin C, (VITAMIN C) 1000 MG tablet Take 1,000 mg by mouth.    cyanocobalamin (VITAMIN B-12) 1000 MCG tablet Take 100 mcg by mouth.    diclofenac sodium (VOLTAREN) 1 % Gel Apply 2 g topically 4 (four) times daily.    famotidine (PEPCID) 20 MG tablet Take 1 tablet (20 mg total) by mouth 2 (two) times daily as needed for Heartburn.    ibuprofen (ADVIL,MOTRIN) 200 MG tablet Take 200 mg by mouth every 6 (six) hours as needed for Pain.    pravastatin (PRAVACHOL) 40 MG tablet TAKE 1 TABLET(40 MG) BY MOUTH EVERY DAY    valsartan (DIOVAN) 160 MG tablet Take 1 tablet (160 mg total) by mouth once daily.    vitamin D (VITAMIN D3) 1000 units Tab Take 1,000 Units by mouth.    baclofen (LIORESAL) 10 MG tablet Take 1 tablet (10 mg total) by mouth 3 (three) times daily as needed (pain / spasms).    naproxen (NAPROSYN) 500 MG tablet Take 1 tablet (500 mg total) by mouth 2 (two) times daily with meals.     No current facility-administered medications for this visit.       REVIEW OF SYSTEMS:    GENERAL:  No weight loss, malaise or fevers.  HEENT:   No recent changes in vision or hearing  NECK:  Negative for lumps, no difficulty with swallowing.  RESPIRATORY:  Negative for cough, wheezing or shortness of breath,  "patient denies any recent URI.  CARDIOVASCULAR:  Negative for chest pain, leg swelling or palpitations.  GI:  Negative for abdominal discomfort, blood in stools or black stools or change in bowel habits.  MUSCULOSKELETAL:  See HPI.  SKIN:  Negative for lesions, rash, and itching.  PSYCH:  No mood disorder or recent psychosocial stressors.  Patients sleep is not disturbed secondary to pain.  HEMATOLOGY/LYMPHOLOGY:  Negative for prolonged bleeding, bruising easily or swollen nodes.  Patient is not currently taking any anti-coagulants  NEURO:   No history of headaches, syncope, paralysis, seizures or tremors.  All other reviewed and negative other than HPI.    OBJECTIVE:    BP (!) 153/80   Pulse 74   Resp 18   Ht 5' 9" (1.753 m)   Wt 78.5 kg (173 lb)   BMI 25.55 kg/m²     PHYSICAL EXAMINATION:    GENERAL: Well appearing, in no acute distress, alert and oriented x3.  PSYCH:  Mood and affect appropriate.  SKIN: Skin color, texture, turgor normal, no rashes or lesions.  HEAD/FACE:  Normocephalic, atraumatic. Cranial nerves grossly intact.  CV: RRR with palpation of the radial artery.  PULM: CTAB. No evidence of respiratory difficulty, symmetric chest rise.  GI:  Soft and non-tender.    BACK:   - No obvious deformity or signs of trauma, Normal lumbar lordotic curve  - Negative spinous process tenderness  - Negative paravertebral tenderness  - Negative pain to palpation over the facet joints of the lumbar spine.   - Negative QL / Iliac crest / Glut tenderness  - Slump test is Negative for radicular pain  - Slump test is Negative for back pain  - Supine Straight leg raising is Negative for radicular pain  - Supine Straight leg raising is Negative for back pain  - Lumbar ROM is normal in Flexion without pain  - Lumbar ROM is normal in Extension without pain  - Lumbar ROM is normal in Lateral Flexion without pain  - Lumbar ROM is normal in Rotation without pain  - Negative Sustained Hip Flexion test (for discogenic " pain)  - Negative Altered Gait, Posture  - Axial facet loading test Negative on the bilateral side(s)    SI Joint exam:  - Negative SI joint tenderness to palpation  - Herb's sign Negative  - Yeoman's Test: Did not perform for SI joint pain indicating anterior SI ligament involvement. Negative for anterior thigh pain/paresthesia which indicates femoral nerve stretch.  - Gaenslen's Test:Negative  - Finger Alexandra's Sign:Negative  - SI compression test:Did not perform  - SI distraction test:Did not perform  - Thigh Thrust: Did not perform  - SI Thrust: Did not perform    MUSKULOSKELETAL:    EXTREMITIES:   Hip Exam:  - Log Roll Negative  - FADIR Negative  - Stinchfield Negative  - Hip Scour Negative  - GTB Tenderness Negative     Knee exam:  -No TTP to medial or lateral joint line  -No erythema  -no edema  (-) anterior and posterior drawer  (-) elvira  (-) pain with squat  (-) pain with varus/valgus stress    MUSCULOSKELETAL:  No atrophy or tone abnormalities are noted in the UE or LE.  No deformities, edema, or skin discoloration are noted on visible skin. Good capillary refill.    NEURO: Bilateral upper and lower extremity coordination and muscle stretch reflexes are physiologic and symmetric.      NEUROLOGICAL EXAM:  MENTAL STATUS: A x O x 3, good concentration, speech is fluent and goal directed  MEMORY: recent and remote are intact  CN: CN2-12 grossly intact  MOTOR: 5/5 in all muscle groups  DTRs: 0 symmetric  Sensation:    -no Loss of sensation in a left lower and right lower L-1, L-2, L-3, L-4 and L-5 bilaterally distribution.    GAIT: normal.

## 2022-07-26 ENCOUNTER — TELEPHONE (OUTPATIENT)
Dept: PAIN MEDICINE | Facility: CLINIC | Age: 67
End: 2022-07-26

## 2022-07-26 ENCOUNTER — OFFICE VISIT (OUTPATIENT)
Dept: PAIN MEDICINE | Facility: CLINIC | Age: 67
End: 2022-07-26
Payer: MEDICARE

## 2022-07-26 VITALS
DIASTOLIC BLOOD PRESSURE: 80 MMHG | HEIGHT: 69 IN | SYSTOLIC BLOOD PRESSURE: 153 MMHG | WEIGHT: 173 LBS | RESPIRATION RATE: 18 BRPM | BODY MASS INDEX: 25.62 KG/M2 | HEART RATE: 74 BPM

## 2022-07-26 DIAGNOSIS — G89.29 CHRONIC PAIN OF LEFT KNEE: ICD-10-CM

## 2022-07-26 DIAGNOSIS — M54.16 LEFT LUMBAR RADICULOPATHY: Primary | ICD-10-CM

## 2022-07-26 DIAGNOSIS — M54.16 LUMBAR RADICULOPATHY: ICD-10-CM

## 2022-07-26 DIAGNOSIS — M25.562 CHRONIC PAIN OF LEFT KNEE: ICD-10-CM

## 2022-07-26 DIAGNOSIS — M96.1 POST LAMINECTOMY SYNDROME: ICD-10-CM

## 2022-07-26 PROCEDURE — 4010F ACE/ARB THERAPY RXD/TAKEN: CPT | Mod: CPTII,S$GLB,, | Performed by: STUDENT IN AN ORGANIZED HEALTH CARE EDUCATION/TRAINING PROGRAM

## 2022-07-26 PROCEDURE — 3079F DIAST BP 80-89 MM HG: CPT | Mod: CPTII,S$GLB,, | Performed by: STUDENT IN AN ORGANIZED HEALTH CARE EDUCATION/TRAINING PROGRAM

## 2022-07-26 PROCEDURE — 1125F PR PAIN SEVERITY QUANTIFIED, PAIN PRESENT: ICD-10-PCS | Mod: CPTII,S$GLB,, | Performed by: STUDENT IN AN ORGANIZED HEALTH CARE EDUCATION/TRAINING PROGRAM

## 2022-07-26 PROCEDURE — 1101F PT FALLS ASSESS-DOCD LE1/YR: CPT | Mod: CPTII,S$GLB,, | Performed by: STUDENT IN AN ORGANIZED HEALTH CARE EDUCATION/TRAINING PROGRAM

## 2022-07-26 PROCEDURE — 3077F SYST BP >= 140 MM HG: CPT | Mod: CPTII,S$GLB,, | Performed by: STUDENT IN AN ORGANIZED HEALTH CARE EDUCATION/TRAINING PROGRAM

## 2022-07-26 PROCEDURE — 3079F PR MOST RECENT DIASTOLIC BLOOD PRESSURE 80-89 MM HG: ICD-10-PCS | Mod: CPTII,S$GLB,, | Performed by: STUDENT IN AN ORGANIZED HEALTH CARE EDUCATION/TRAINING PROGRAM

## 2022-07-26 PROCEDURE — 99214 OFFICE O/P EST MOD 30 MIN: CPT | Mod: S$GLB,,, | Performed by: STUDENT IN AN ORGANIZED HEALTH CARE EDUCATION/TRAINING PROGRAM

## 2022-07-26 PROCEDURE — 1125F AMNT PAIN NOTED PAIN PRSNT: CPT | Mod: CPTII,S$GLB,, | Performed by: STUDENT IN AN ORGANIZED HEALTH CARE EDUCATION/TRAINING PROGRAM

## 2022-07-26 PROCEDURE — 99999 PR PBB SHADOW E&M-EST. PATIENT-LVL III: CPT | Mod: PBBFAC,,, | Performed by: STUDENT IN AN ORGANIZED HEALTH CARE EDUCATION/TRAINING PROGRAM

## 2022-07-26 PROCEDURE — 3008F PR BODY MASS INDEX (BMI) DOCUMENTED: ICD-10-PCS | Mod: CPTII,S$GLB,, | Performed by: STUDENT IN AN ORGANIZED HEALTH CARE EDUCATION/TRAINING PROGRAM

## 2022-07-26 PROCEDURE — 99999 PR PBB SHADOW E&M-EST. PATIENT-LVL III: ICD-10-PCS | Mod: PBBFAC,,, | Performed by: STUDENT IN AN ORGANIZED HEALTH CARE EDUCATION/TRAINING PROGRAM

## 2022-07-26 PROCEDURE — 99214 PR OFFICE/OUTPT VISIT, EST, LEVL IV, 30-39 MIN: ICD-10-PCS | Mod: S$GLB,,, | Performed by: STUDENT IN AN ORGANIZED HEALTH CARE EDUCATION/TRAINING PROGRAM

## 2022-07-26 PROCEDURE — 1101F PR PT FALLS ASSESS DOC 0-1 FALLS W/OUT INJ PAST YR: ICD-10-PCS | Mod: CPTII,S$GLB,, | Performed by: STUDENT IN AN ORGANIZED HEALTH CARE EDUCATION/TRAINING PROGRAM

## 2022-07-26 PROCEDURE — 1160F PR REVIEW ALL MEDS BY PRESCRIBER/CLIN PHARMACIST DOCUMENTED: ICD-10-PCS | Mod: CPTII,S$GLB,, | Performed by: STUDENT IN AN ORGANIZED HEALTH CARE EDUCATION/TRAINING PROGRAM

## 2022-07-26 PROCEDURE — 3008F BODY MASS INDEX DOCD: CPT | Mod: CPTII,S$GLB,, | Performed by: STUDENT IN AN ORGANIZED HEALTH CARE EDUCATION/TRAINING PROGRAM

## 2022-07-26 PROCEDURE — 3288F PR FALLS RISK ASSESSMENT DOCUMENTED: ICD-10-PCS | Mod: CPTII,S$GLB,, | Performed by: STUDENT IN AN ORGANIZED HEALTH CARE EDUCATION/TRAINING PROGRAM

## 2022-07-26 PROCEDURE — 1159F MED LIST DOCD IN RCRD: CPT | Mod: CPTII,S$GLB,, | Performed by: STUDENT IN AN ORGANIZED HEALTH CARE EDUCATION/TRAINING PROGRAM

## 2022-07-26 PROCEDURE — 1159F PR MEDICATION LIST DOCUMENTED IN MEDICAL RECORD: ICD-10-PCS | Mod: CPTII,S$GLB,, | Performed by: STUDENT IN AN ORGANIZED HEALTH CARE EDUCATION/TRAINING PROGRAM

## 2022-07-26 PROCEDURE — 1160F RVW MEDS BY RX/DR IN RCRD: CPT | Mod: CPTII,S$GLB,, | Performed by: STUDENT IN AN ORGANIZED HEALTH CARE EDUCATION/TRAINING PROGRAM

## 2022-07-26 PROCEDURE — 3077F PR MOST RECENT SYSTOLIC BLOOD PRESSURE >= 140 MM HG: ICD-10-PCS | Mod: CPTII,S$GLB,, | Performed by: STUDENT IN AN ORGANIZED HEALTH CARE EDUCATION/TRAINING PROGRAM

## 2022-07-26 PROCEDURE — 4010F PR ACE/ARB THEARPY RXD/TAKEN: ICD-10-PCS | Mod: CPTII,S$GLB,, | Performed by: STUDENT IN AN ORGANIZED HEALTH CARE EDUCATION/TRAINING PROGRAM

## 2022-07-26 PROCEDURE — 3288F FALL RISK ASSESSMENT DOCD: CPT | Mod: CPTII,S$GLB,, | Performed by: STUDENT IN AN ORGANIZED HEALTH CARE EDUCATION/TRAINING PROGRAM

## 2022-07-26 RX ORDER — BACLOFEN 10 MG/1
10 TABLET ORAL 3 TIMES DAILY PRN
Qty: 90 TABLET | Refills: 0 | Status: SHIPPED | OUTPATIENT
Start: 2022-07-26 | End: 2024-01-08 | Stop reason: ALTCHOICE

## 2022-07-29 ENCOUNTER — HOSPITAL ENCOUNTER (OUTPATIENT)
Facility: OTHER | Age: 67
Discharge: HOME | End: 2022-07-29
Attending: ANESTHESIOLOGY | Admitting: ANESTHESIOLOGY
Payer: MEDICARE

## 2022-07-29 VITALS
BODY MASS INDEX: 25.18 KG/M2 | HEIGHT: 69 IN | HEART RATE: 74 BPM | RESPIRATION RATE: 18 BRPM | SYSTOLIC BLOOD PRESSURE: 123 MMHG | DIASTOLIC BLOOD PRESSURE: 67 MMHG | WEIGHT: 170 LBS | OXYGEN SATURATION: 98 % | TEMPERATURE: 98 F

## 2022-07-29 DIAGNOSIS — M54.16 LUMBAR RADICULOPATHY: Primary | ICD-10-CM

## 2022-07-29 DIAGNOSIS — G89.29 CHRONIC PAIN: ICD-10-CM

## 2022-07-29 DIAGNOSIS — M51.37 DDD (DEGENERATIVE DISC DISEASE), LUMBOSACRAL: ICD-10-CM

## 2022-07-29 PROCEDURE — 64483 NJX AA&/STRD TFRM EPI L/S 1: CPT | Mod: LT | Performed by: ANESTHESIOLOGY

## 2022-07-29 PROCEDURE — 64483 PR EPIDURAL INJ, ANES/STEROID, TRANSFORAMINAL, LUMB/SACR, SNGL LEVL: ICD-10-PCS | Mod: LT,,, | Performed by: ANESTHESIOLOGY

## 2022-07-29 PROCEDURE — 63600175 PHARM REV CODE 636 W HCPCS: Performed by: ANESTHESIOLOGY

## 2022-07-29 PROCEDURE — 25000003 PHARM REV CODE 250: Performed by: ANESTHESIOLOGY

## 2022-07-29 PROCEDURE — 64483 NJX AA&/STRD TFRM EPI L/S 1: CPT | Mod: LT,,, | Performed by: ANESTHESIOLOGY

## 2022-07-29 PROCEDURE — 25500020 PHARM REV CODE 255: Performed by: ANESTHESIOLOGY

## 2022-07-29 RX ORDER — SODIUM CHLORIDE 9 MG/ML
500 INJECTION, SOLUTION INTRAVENOUS CONTINUOUS
Status: ACTIVE | OUTPATIENT
Start: 2022-07-29

## 2022-07-29 RX ORDER — FENTANYL CITRATE 50 UG/ML
INJECTION, SOLUTION INTRAMUSCULAR; INTRAVENOUS
Status: DISCONTINUED | OUTPATIENT
Start: 2022-07-29 | End: 2022-07-29 | Stop reason: HOSPADM

## 2022-07-29 RX ORDER — LIDOCAINE HYDROCHLORIDE 20 MG/ML
INJECTION, SOLUTION INFILTRATION; PERINEURAL
Status: DISCONTINUED | OUTPATIENT
Start: 2022-07-29 | End: 2022-07-29 | Stop reason: HOSPADM

## 2022-07-29 RX ORDER — DEXAMETHASONE SODIUM PHOSPHATE 10 MG/ML
INJECTION INTRAMUSCULAR; INTRAVENOUS
Status: DISCONTINUED | OUTPATIENT
Start: 2022-07-29 | End: 2022-07-29 | Stop reason: HOSPADM

## 2022-07-29 RX ORDER — LIDOCAINE HYDROCHLORIDE 10 MG/ML
INJECTION, SOLUTION EPIDURAL; INFILTRATION; INTRACAUDAL; PERINEURAL
Status: DISCONTINUED | OUTPATIENT
Start: 2022-07-29 | End: 2022-07-29 | Stop reason: HOSPADM

## 2022-07-29 RX ORDER — MIDAZOLAM HYDROCHLORIDE 1 MG/ML
INJECTION INTRAMUSCULAR; INTRAVENOUS
Status: DISCONTINUED | OUTPATIENT
Start: 2022-07-29 | End: 2022-07-29 | Stop reason: HOSPADM

## 2022-07-29 NOTE — DISCHARGE SUMMARY
Discharge Note  Short Stay      SUMMARY     Admit Date: 7/29/2022    Attending Physician: Jaspreet Amos      Discharge Physician: Jaspreet Amos      Discharge Date: 7/29/2022 11:53 AM    Procedure(s) (LRB):  INJECTION, STEROID, EPIDURAL, TRANSFORAMINAL APPROACH, LEFT L3-L4 CONTRAST DIRECT REF (Left)    Final Diagnosis: Lumbar radiculopathy [M54.16]    Disposition: Home or self care    Patient Instructions:   Current Discharge Medication List      CONTINUE these medications which have NOT CHANGED    Details   ascorbic acid, vitamin C, (VITAMIN C) 1000 MG tablet Take 1,000 mg by mouth.      baclofen (LIORESAL) 10 MG tablet Take 1 tablet (10 mg total) by mouth 3 (three) times daily as needed (pain / spasms).  Qty: 90 tablet, Refills: 0    Associated Diagnoses: Left lumbar radiculopathy      cyanocobalamin (VITAMIN B-12) 1000 MCG tablet Take 100 mcg by mouth.      diclofenac sodium (VOLTAREN) 1 % Gel Apply 2 g topically 4 (four) times daily.  Qty: 2 each, Refills: 3    Associated Diagnoses: Acute pain of left knee; Arthritis of left knee      famotidine (PEPCID) 20 MG tablet Take 1 tablet (20 mg total) by mouth 2 (two) times daily as needed for Heartburn.  Qty: 180 tablet, Refills: 12      ibuprofen (ADVIL,MOTRIN) 200 MG tablet Take 200 mg by mouth every 6 (six) hours as needed for Pain.      pravastatin (PRAVACHOL) 40 MG tablet TAKE 1 TABLET(40 MG) BY MOUTH EVERY DAY  Qty: 90 tablet, Refills: 0      valsartan (DIOVAN) 160 MG tablet Take 1 tablet (160 mg total) by mouth once daily.  Qty: 90 tablet, Refills: 3    Comments: .      vitamin D (VITAMIN D3) 1000 units Tab Take 1,000 Units by mouth.         STOP taking these medications       gabapentin (NEURONTIN) 100 MG capsule Comments:   Reason for Stopping:                   Discharge Diagnosis: Lumbar radiculopathy [M54.16]  Condition on Discharge: Stable with no complications to procedure   Diet on Discharge: Same as before.  Activity: as per instruction  sheet.  Discharge to: Home with a responsible adult.  Follow up: 2-4 weeks

## 2022-07-29 NOTE — DISCHARGE INSTRUCTIONS

## 2022-07-29 NOTE — INTERVAL H&P NOTE
The patient has been examined and the H&P has been reviewed:    I concur with the findings and no changes have occurred since H&P was written.   No recent infections and no recent blood thinners.    Procedure risks, benefits and alternative options discussed and understood by patient/family.          There are no hospital problems to display for this patient.

## 2022-07-29 NOTE — OP NOTE
Lumbar Transforaminal Epidural Steroid Injection under Fluoroscopic Guidance    The procedure, risks, benefits, and options were discussed with the patient. There are no contraindications to the procedure. The patent expressed understanding and agreed to the procedure. Informed written consent was obtained prior to the start of the procedure and can be found in the patient's chart.    PATIENT NAME: Dariusz Urbina   MRN: [id number]     DATE OF PROCEDURE: 07/29/2022    PROCEDURE:  Left  L3/4 Lumbar Transforaminal Epidural Steroid Injection under Fluoroscopic Guidance    PRE-OP DIAGNOSIS: Lumbar radiculopathy [M54.16] Lumbar radiculopathy [M54.16]    POST-OP DIAGNOSIS: Same    PHYSICIAN: Jaspreet Amos MD    ASSISTANTS: Dr. Jj     MEDICATIONS INJECTED: Preservative-free Decadron 10mg with 5cc of Lidocaine 1% MPF     LOCAL ANESTHETIC INJECTED: Xylocaine 2%     SEDATION:   Versed 2mg and Fentanyl 50mcg                                                                                                                                                                                     Conscious sedation ordered by M.D. Patient re-evaluation prior to administration of conscious sedation. No changes noted in patient's status from initial evaluation. The patient's vital signs were monitored by RN and patient remained hemodynamically stable throughout the procedure.    Event Time In   Sedation Start 1150   Sedation End 1153       ESTIMATED BLOOD LOSS: None    COMPLICATIONS: None    TECHNIQUE: Time-out was performed to identify the patient and procedure to be performed. With the patient laying in a prone position, the surgical area was prepped and draped in the usual sterile fashion using ChloraPrep and a fenestrated drape.The levels were determined under fluoroscopy guidance. Skin anesthesia was achieved by injecting Lidocaine 2% over the injection sites. The transforaminal spaces were then approached with a 25 gauge, 3.5 inch  spinal quinke needle that was introduced under fluoroscopic guidance in the AP and Lateral views. Once the needle tip was in the area of the transforaminal space, and there was no blood, CSF or paraesthesias, contrast dye Omnipaque (240mg/mL) was injected to confirm placement and there was no vascular runoff. Fluoroscopic imaging in the AP and lateral views revealed a clear outline of the spinal nerve with proximal spread of agent through the neural foramen into the epidural space. 3 mL of the medication mixture listed above was injected slowly at each site. Displacement of the radio opaque contrast after injection of the medication confirmed that the medication went into the area of the transforaminal spaces. The needles were removed and bleeding was nil. A sterile dressing was applied. No specimens collected. The patient tolerated the procedure well.       The patient was monitored after the procedure in the recovery area. They were given post-procedure and discharge instructions to follow at home. The patient was discharged in a stable condition.    I reviewed and edited the fellow's note. I conducted my own interview and physical examination. I agree with the findings. I was present and supervising all critical portions of the procedure.    Jaspreet Amos MD

## 2022-08-10 RX ORDER — VALSARTAN 80 MG/1
TABLET ORAL
Qty: 90 TABLET | Refills: 3 | OUTPATIENT
Start: 2022-08-10

## 2022-08-10 NOTE — TELEPHONE ENCOUNTER
Refill Decision Note   Dariusz Urbina  is requesting a refill authorization.  Brief Assessment and Rationale for Refill:  Quick Discontinue     Medication Therapy Plan:  PCP increased to 160mg    Medication Reconciliation Completed: No   Comments:     No Care Gaps recommended.     Note composed:8:55 AM 08/10/2022

## 2022-08-10 NOTE — TELEPHONE ENCOUNTER
No new care gaps identified.  [facility] Embedded Care Gaps. Reference number: [id number]. 8/10/2022   6:02:30 AM CDT

## 2022-08-24 ENCOUNTER — PATIENT MESSAGE (OUTPATIENT)
Dept: PAIN MEDICINE | Facility: CLINIC | Age: 67
End: 2022-08-24
Payer: MEDICARE

## 2022-08-24 DIAGNOSIS — M54.16 LEFT LUMBAR RADICULOPATHY: Primary | ICD-10-CM

## 2022-08-24 NOTE — TELEPHONE ENCOUNTER
Spoke with the patient on the phone.  Getting good improvement, but still needing ibuprofen and ice to make the pain go away.  His bigger issue right now is in the bilateral legs below the knees.  Discussed that I did not want to rush into a third epidural shot given that he has already had 2 injections in a relatively short period of time.  My peference is to wait at least another month before considering a 3rd shot.    Discussed PT and [facility]катерина therapy.  He is interested in trying.  Will send referral.

## 2022-08-30 ENCOUNTER — TELEPHONE (OUTPATIENT)
Dept: FAMILY MEDICINE | Facility: CLINIC | Age: 67
End: 2022-08-30
Payer: MEDICARE

## 2023-02-07 DIAGNOSIS — Z00.00 ENCOUNTER FOR MEDICARE ANNUAL WELLNESS EXAM: ICD-10-CM

## 2023-02-08 ENCOUNTER — OFFICE VISIT (OUTPATIENT)
Dept: FAMILY MEDICINE | Facility: CLINIC | Age: 68
End: 2023-02-08
Payer: MEDICARE

## 2023-02-08 VITALS
WEIGHT: 171.06 LBS | HEART RATE: 77 BPM | OXYGEN SATURATION: 97 % | SYSTOLIC BLOOD PRESSURE: 126 MMHG | RESPIRATION RATE: 17 BRPM | HEIGHT: 69 IN | TEMPERATURE: 98 F | BODY MASS INDEX: 25.34 KG/M2 | DIASTOLIC BLOOD PRESSURE: 64 MMHG

## 2023-02-08 DIAGNOSIS — E11.9 CONTROLLED TYPE 2 DIABETES MELLITUS WITHOUT COMPLICATION, WITHOUT LONG-TERM CURRENT USE OF INSULIN: ICD-10-CM

## 2023-02-08 DIAGNOSIS — J00 ACUTE NASOPHARYNGITIS (COMMON COLD): Primary | ICD-10-CM

## 2023-02-08 DIAGNOSIS — I70.0 ATHEROSCLEROSIS OF AORTA: ICD-10-CM

## 2023-02-08 PROCEDURE — 3078F DIAST BP <80 MM HG: CPT | Mod: HCNC,CPTII,S$GLB, | Performed by: INTERNAL MEDICINE

## 2023-02-08 PROCEDURE — 1101F PT FALLS ASSESS-DOCD LE1/YR: CPT | Mod: HCNC,CPTII,S$GLB, | Performed by: INTERNAL MEDICINE

## 2023-02-08 PROCEDURE — 1159F MED LIST DOCD IN RCRD: CPT | Mod: HCNC,CPTII,S$GLB, | Performed by: INTERNAL MEDICINE

## 2023-02-08 PROCEDURE — 1101F PR PT FALLS ASSESS DOC 0-1 FALLS W/OUT INJ PAST YR: ICD-10-PCS | Mod: HCNC,CPTII,S$GLB, | Performed by: INTERNAL MEDICINE

## 2023-02-08 PROCEDURE — 3074F PR MOST RECENT SYSTOLIC BLOOD PRESSURE < 130 MM HG: ICD-10-PCS | Mod: HCNC,CPTII,S$GLB, | Performed by: INTERNAL MEDICINE

## 2023-02-08 PROCEDURE — 99999 PR PBB SHADOW E&M-EST. PATIENT-LVL III: ICD-10-PCS | Mod: PBBFAC,HCNC,, | Performed by: INTERNAL MEDICINE

## 2023-02-08 PROCEDURE — 99214 PR OFFICE/OUTPT VISIT, EST, LEVL IV, 30-39 MIN: ICD-10-PCS | Mod: HCNC,S$GLB,, | Performed by: INTERNAL MEDICINE

## 2023-02-08 PROCEDURE — 99214 OFFICE O/P EST MOD 30 MIN: CPT | Mod: HCNC,S$GLB,, | Performed by: INTERNAL MEDICINE

## 2023-02-08 PROCEDURE — 3288F PR FALLS RISK ASSESSMENT DOCUMENTED: ICD-10-PCS | Mod: HCNC,CPTII,S$GLB, | Performed by: INTERNAL MEDICINE

## 2023-02-08 PROCEDURE — 1159F PR MEDICATION LIST DOCUMENTED IN MEDICAL RECORD: ICD-10-PCS | Mod: HCNC,CPTII,S$GLB, | Performed by: INTERNAL MEDICINE

## 2023-02-08 PROCEDURE — 3288F FALL RISK ASSESSMENT DOCD: CPT | Mod: HCNC,CPTII,S$GLB, | Performed by: INTERNAL MEDICINE

## 2023-02-08 PROCEDURE — 1160F PR REVIEW ALL MEDS BY PRESCRIBER/CLIN PHARMACIST DOCUMENTED: ICD-10-PCS | Mod: HCNC,CPTII,S$GLB, | Performed by: INTERNAL MEDICINE

## 2023-02-08 PROCEDURE — 3008F PR BODY MASS INDEX (BMI) DOCUMENTED: ICD-10-PCS | Mod: HCNC,CPTII,S$GLB, | Performed by: INTERNAL MEDICINE

## 2023-02-08 PROCEDURE — 3074F SYST BP LT 130 MM HG: CPT | Mod: HCNC,CPTII,S$GLB, | Performed by: INTERNAL MEDICINE

## 2023-02-08 PROCEDURE — 99999 PR PBB SHADOW E&M-EST. PATIENT-LVL III: CPT | Mod: PBBFAC,HCNC,, | Performed by: INTERNAL MEDICINE

## 2023-02-08 PROCEDURE — 3008F BODY MASS INDEX DOCD: CPT | Mod: HCNC,CPTII,S$GLB, | Performed by: INTERNAL MEDICINE

## 2023-02-08 PROCEDURE — 1160F RVW MEDS BY RX/DR IN RCRD: CPT | Mod: HCNC,CPTII,S$GLB, | Performed by: INTERNAL MEDICINE

## 2023-02-08 PROCEDURE — 3078F PR MOST RECENT DIASTOLIC BLOOD PRESSURE < 80 MM HG: ICD-10-PCS | Mod: HCNC,CPTII,S$GLB, | Performed by: INTERNAL MEDICINE

## 2023-02-08 RX ORDER — PROMETHAZINE HYDROCHLORIDE AND DEXTROMETHORPHAN HYDROBROMIDE 6.25; 15 MG/5ML; MG/5ML
5 SYRUP ORAL 2 TIMES DAILY PRN
Qty: 120 ML | Refills: 0 | Status: SHIPPED | OUTPATIENT
Start: 2023-02-08 | End: 2023-02-18

## 2023-02-08 NOTE — PROGRESS NOTES
SUBJECTIVE     No chief complaint on file.      HPI  Dariusz Urbina is a 67 y.o. male with multiple medical diagnoses as listed in the medical history and problem list that presents for evaluation of URI x 3-4 days. Pt reports a productive cough and fatigue. Denies any fever, chills, or night sweats. Pt has been taking Delsym without much improvement. Denies any sick contacts. +recent travel to the [facility] via car and plane. Pt took a home COVID19 test this week and it was negative.    PAST MEDICAL HISTORY:  Past Medical History:   Diagnosis Date    Controlled type 2 diabetes mellitus without complication, without long-term current use of insulin 2023    Decreased libido 2013    Dermatitis 10/12/2012    Hyperlipidemia 2013    Hypertension        PAST SURGICAL HISTORY:  Past Surgical History:   Procedure Laterality Date    COLONOSCOPY      COLONOSCOPY N/A 2022    Procedure: COLONOSCOPY;  Surgeon: Omaira Mcclelland MD;  Location: [facility] ENDO;  Service: Endoscopy;  Laterality: N/A;  fully vaccinated  instructions emailed   [facility] provider from Dr. Alvarez to Dr. Mcclelland---    HIP REPLACEMENT ARTHROPLASTY  2019    LUMBAR FUSION      titanium rods    TONSILLECTOMY      TRANSFORAMINAL EPIDURAL INJECTION OF STEROID Left 2022    Procedure: TFESI Left  L3-4;  Surgeon: Mario Lamb DO;  Location: [facility] OR;  Service: Pain Management;  Laterality: Left;    TRANSFORAMINAL EPIDURAL INJECTION OF STEROID Left 2022    Procedure: INJECTION, STEROID, EPIDURAL, TRANSFORAMINAL APPROACH, LEFT L3-L4 CONTRAST DIRECT REF;  Surgeon: Jaspreet Amos MD;  Location: [facility] PAIN MGT;  Service: Pain Management;  Laterality: Left;       SOCIAL HISTORY:  Social History     Socioeconomic History    Marital status:    Tobacco Use    Smoking status: Former     Packs/day: 1.00     Years: 10.00     Pack years: 10.00     Types: Cigarettes     Quit date: 1989     Years since quittin.5    Smokeless  tobacco: Never   Substance and Sexual Activity    Alcohol use: No     Comment: RARELY    Drug use: Not Currently     Social Determinants of Health     Financial Resource Strain: Low Risk     Difficulty of Paying Living Expenses: Not hard at all   Food Insecurity: No Food Insecurity    Worried About Running Out of Food in the Last Year: Never true    Ran Out of Food in the Last Year: Never true   Transportation Needs: No Transportation Needs    Lack of Transportation (Medical): No    Lack of Transportation (Non-Medical): No   Physical Activity: Sufficiently Active    Days of Exercise per Week: 3 days    Minutes of Exercise per Session: 60 min   Stress: No Stress Concern Present    Feeling of Stress : Not at all   Social Connections: Unknown    Frequency of Communication with Friends and Family: More than three times a week    Frequency of Social Gatherings with Friends and Family: More than three times a week    Active Member of Clubs or Organizations: Yes    Attends Club or Organization Meetings: More than 4 times per year    Marital Status:    Housing Stability: Low Risk     Unable to Pay for Housing in the Last Year: No    Number of Places Lived in the Last Year: 1    Unstable Housing in the Last Year: No       FAMILY HISTORY:  History reviewed. No pertinent family history.    ALLERGIES AND MEDICATIONS: updated and reviewed.  Review of patient's allergies indicates:  No Known Allergies  Current Outpatient Medications   Medication Sig Dispense Refill    famotidine (PEPCID) 20 MG tablet Take 1 tablet (20 mg total) by mouth 2 (two) times daily as needed for Heartburn. 180 tablet 12    pravastatin (PRAVACHOL) 40 MG tablet Take 1 tablet (40 mg total) by mouth once daily. 90 tablet 0    valsartan (DIOVAN) 160 MG tablet Take 1 tablet (160 mg total) by mouth once daily. 90 tablet 3    ascorbic acid, vitamin C, (VITAMIN C) 1000 MG tablet Take 1,000 mg by mouth.      baclofen (LIORESAL) 10 MG tablet Take 1 tablet (10  "mg total) by mouth 3 (three) times daily as needed (pain / spasms). 90 tablet 0    cyanocobalamin (VITAMIN B-12) 1000 MCG tablet Take 100 mcg by mouth.      diclofenac sodium (VOLTAREN) 1 % Gel Apply 2 g topically 4 (four) times daily. (Patient not taking: Reported on 2/8/2023) 2 each 3    ibuprofen (ADVIL,MOTRIN) 200 MG tablet Take 200 mg by mouth every 6 (six) hours as needed for Pain.      promethazine-dextromethorphan (PROMETHAZINE-DM) 6.25-15 mg/5 mL Syrp Take 5 mLs by mouth 2 (two) times daily as needed (cough). 120 mL 0    vitamin D (VITAMIN D3) 1000 units Tab Take 1,000 Units by mouth.       No current facility-administered medications for this visit.     Facility-Administered Medications Ordered in Other Visits   Medication Dose Route Frequency Provider Last Rate Last Admin    0.9%  NaCl infusion  500 mL Intravenous Continuous Gee Jj MD           ROS  Review of Systems   Constitutional:  Positive for fatigue. Negative for chills and fever.   HENT:  Negative for hearing loss and sore throat.    Eyes:  Negative for visual disturbance.   Respiratory:  Positive for cough. Negative for shortness of breath.    Cardiovascular:  Negative for chest pain, palpitations and leg swelling.   Gastrointestinal:  Negative for abdominal pain, constipation, diarrhea, nausea and vomiting.   Genitourinary:  Negative for dysuria, frequency and urgency.   Musculoskeletal:  Negative for arthralgias, joint swelling and myalgias.   Skin:  Negative for rash and wound.   Neurological:  Negative for headaches.   Psychiatric/Behavioral:  Negative for agitation and confusion. The patient is not nervous/anxious.        OBJECTIVE     Physical Exam  Vitals:    02/08/23 0958   BP: 126/64   Pulse: 77   Resp: 17   Temp: 98.2 °F (36.8 °C)    Body mass index is 25.26 kg/m².  Weight: 77.6 kg (171 lb 1.2 oz)   Height: 5' 9" (175.3 cm)     Physical Exam  Constitutional:       General: He is not in acute distress.     Appearance: He is " well-developed.   HENT:      Head: Normocephalic and atraumatic.      Right Ear: External ear normal.      Left Ear: External ear normal.      Nose: Nose normal.   Eyes:      General: No scleral icterus.        Right eye: No discharge.         Left eye: No discharge.      Conjunctiva/sclera: Conjunctivae normal.   Neck:      Vascular: No JVD.      Trachea: No tracheal deviation.   Cardiovascular:      Rate and Rhythm: Normal rate and regular rhythm.      Heart sounds: Normal heart sounds. No murmur heard.    No friction rub. No gallop.   Pulmonary:      Effort: Pulmonary effort is normal. No respiratory distress.      Breath sounds: Normal breath sounds. No wheezing.      Comments: No egophany, no dullness to percussion, normal fremitus      Abdominal:      General: Bowel sounds are normal. There is no distension.      Palpations: Abdomen is soft. There is no mass.      Tenderness: There is no abdominal tenderness. There is no guarding or rebound.   Musculoskeletal:         General: No tenderness or deformity. Normal range of motion.      Cervical back: Normal range of motion and neck supple.   Skin:     General: Skin is warm and dry.      Findings: No erythema or rash.   Neurological:      Mental Status: He is alert and oriented to person, place, and time.      Motor: No abnormal muscle tone.      Coordination: Coordination normal.   Psychiatric:         Behavior: Behavior normal.         Thought Content: Thought content normal.         Judgment: Judgment normal.         Health Maintenance         Date Due Completion Date    Abdominal Aortic Aneurysm Screening Never done ---    PROSTATE-SPECIFIC ANTIGEN 07/07/2023 7/7/2022    High Dose Statin 02/08/2024 2/8/2023    Lipid Panel 07/07/2027 7/7/2022    TETANUS VACCINE 02/01/2028 2/1/2018    Colorectal Cancer Screening 06/16/2029 6/16/2022              ASSESSMENT     67 y.o. male with     1. Acute nasopharyngitis (common cold)    2. Controlled type 2 diabetes mellitus  without complication, without long-term current use of insulin    3. Atherosclerosis of aorta        PLAN:     1. Acute nasopharyngitis (common cold)  - Continue symptomatic treatment with rest, increase fluid intake, tylenol or ibuprofen PRN fever(temp >/= 100.4) or body aches. Okay to take OTC antihistamines, i.e. Bendaryl, Claritin, Allegra, etc. as needed.  - Okay to gargle with warm, salt water or use throat lozenges as needed  - promethazine-dextromethorphan (PROMETHAZINE-DM) 6.25-15 mg/5 mL Syrp; Take 5 mLs by mouth 2 (two) times daily as needed (cough).  Dispense: 120 mL; Refill: 0    2. Controlled type 2 diabetes mellitus without complication, without long-term current use of insulin  - Hemoglobin A1C; Future    3. Atherosclerosis of aorta  - Stable; no acute issues        RTC in 1-2 weeks as needed for any acute worsening of current condition or failure to improve       Madalyn Hurtado MD  02/08/2023 10:36 AM        No follow-ups on file.

## 2023-02-09 ENCOUNTER — PATIENT MESSAGE (OUTPATIENT)
Dept: FAMILY MEDICINE | Facility: CLINIC | Age: 68
End: 2023-02-09
Payer: MEDICARE

## 2023-02-09 ENCOUNTER — PATIENT MESSAGE (OUTPATIENT)
Dept: ADMINISTRATIVE | Facility: HOSPITAL | Age: 68
End: 2023-02-09
Payer: MEDICARE

## 2023-02-09 DIAGNOSIS — Z00.00 ENCOUNTER FOR MEDICARE ANNUAL WELLNESS EXAM: ICD-10-CM

## 2023-02-15 ENCOUNTER — LAB VISIT (OUTPATIENT)
Dept: LAB | Facility: HOSPITAL | Age: 68
End: 2023-02-15
Attending: INTERNAL MEDICINE
Payer: MEDICARE

## 2023-02-15 DIAGNOSIS — E78.49 OTHER HYPERLIPIDEMIA: ICD-10-CM

## 2023-02-15 LAB
ALBUMIN SERPL BCP-MCNC: 3.7 G/DL (ref 3.5–5.2)
ALP SERPL-CCNC: 61 U/L (ref 55–135)
ALT SERPL W/O P-5'-P-CCNC: 14 U/L (ref 10–44)
ANION GAP SERPL CALC-SCNC: 9 MMOL/L (ref 8–16)
AST SERPL-CCNC: 21 U/L (ref 10–40)
BILIRUB SERPL-MCNC: 0.4 MG/DL (ref 0.1–1)
BUN SERPL-MCNC: 16 MG/DL (ref 8–23)
CALCIUM SERPL-MCNC: 9.4 MG/DL (ref 8.7–10.5)
CHLORIDE SERPL-SCNC: 101 MMOL/L (ref 95–110)
CHOLEST SERPL-MCNC: 166 MG/DL (ref 120–199)
CHOLEST/HDLC SERPL: 3.9 {RATIO} (ref 2–5)
CO2 SERPL-SCNC: 25 MMOL/L (ref 23–29)
CREAT SERPL-MCNC: 1.1 MG/DL (ref 0.5–1.4)
EST. GFR  (NO RACE VARIABLE): >60 ML/MIN/1.73 M^2
GLUCOSE SERPL-MCNC: 138 MG/DL (ref 70–110)
HDLC SERPL-MCNC: 43 MG/DL (ref 40–75)
HDLC SERPL: 25.9 % (ref 20–50)
LDLC SERPL CALC-MCNC: 99.6 MG/DL (ref 63–159)
NONHDLC SERPL-MCNC: 123 MG/DL
POTASSIUM SERPL-SCNC: 4.8 MMOL/L (ref 3.5–5.1)
PROT SERPL-MCNC: 7.3 G/DL (ref 6–8.4)
SODIUM SERPL-SCNC: 135 MMOL/L (ref 136–145)
TRIGL SERPL-MCNC: 117 MG/DL (ref 30–150)
TSH SERPL DL<=0.005 MIU/L-ACNC: 2.67 UIU/ML (ref 0.4–4)

## 2023-02-15 PROCEDURE — 80061 LIPID PANEL: CPT | Mod: HCNC | Performed by: INTERNAL MEDICINE

## 2023-02-15 PROCEDURE — 80053 COMPREHEN METABOLIC PANEL: CPT | Mod: HCNC | Performed by: INTERNAL MEDICINE

## 2023-02-15 PROCEDURE — 36415 COLL VENOUS BLD VENIPUNCTURE: CPT | Mod: HCNC,PO | Performed by: INTERNAL MEDICINE

## 2023-02-15 PROCEDURE — 84443 ASSAY THYROID STIM HORMONE: CPT | Mod: HCNC | Performed by: INTERNAL MEDICINE

## 2023-02-28 ENCOUNTER — TELEPHONE (OUTPATIENT)
Dept: FAMILY MEDICINE | Facility: CLINIC | Age: 68
End: 2023-02-28
Payer: MEDICARE

## 2023-02-28 NOTE — TELEPHONE ENCOUNTER
Spoke with pt and given list of OTC's to take for his symptoms. Pt told to call if his symptoms get worse.

## 2023-02-28 NOTE — TELEPHONE ENCOUNTER
----- Message from Janiya Haley sent at 2/28/2023  9:37 AM CST -----  Please call [phone] Pt states he took a home COVID test and it tested Positive . He wants to get a chest xray and a script for meds to help.

## 2023-03-23 ENCOUNTER — PES CALL (OUTPATIENT)
Dept: ADMINISTRATIVE | Facility: CLINIC | Age: 68
End: 2023-03-23
Payer: MEDICARE

## 2023-03-24 ENCOUNTER — PATIENT MESSAGE (OUTPATIENT)
Dept: ADMINISTRATIVE | Facility: HOSPITAL | Age: 68
End: 2023-03-24
Payer: MEDICARE

## 2023-04-11 ENCOUNTER — PATIENT OUTREACH (OUTPATIENT)
Dept: ADMINISTRATIVE | Facility: HOSPITAL | Age: 68
End: 2023-04-11
Payer: MEDICARE

## 2023-04-11 DIAGNOSIS — E11.9 CONTROLLED TYPE 2 DIABETES MELLITUS WITHOUT COMPLICATION, WITHOUT LONG-TERM CURRENT USE OF INSULIN: Primary | ICD-10-CM

## 2023-04-11 NOTE — PROGRESS NOTES
HM and immunization's reviewed.  KO sent to eye dr on file  ELEVATED BP, PLEASE DISCUSS WITH PT AT NEXT APPT

## 2023-04-11 NOTE — LETTER
AUTHORIZATION FOR RELEASE OF   CONFIDENTIAL INFORMATION    [facility] [employer]    We are seeing Dariusz Urbina, date of birth 1955, in the clinic at [facility] FAMILY [facility]/ INTERNAL MED/ PEDS. Nils Espino MD is the patient's PCP. Dariusz Urbina has an outstanding lab/procedure at the time we reviewed his chart. In order to help keep his health information updated, he has authorized us to request the following medical record(s):                                            (  )  DM EYE EXAM               Please fax records to [facility],  [phone]       If you have any questions, please contact , Frances Thomas at [phone].           Patient Name: Dariusz Urbina  : 1955  Patient Phone #: [phone]

## 2023-05-04 ENCOUNTER — PATIENT OUTREACH (OUTPATIENT)
Dept: ADMINISTRATIVE | Facility: HOSPITAL | Age: 68
End: 2023-05-04
Payer: MEDICARE

## 2023-08-29 ENCOUNTER — LAB VISIT (OUTPATIENT)
Dept: LAB | Facility: HOSPITAL | Age: 68
End: 2023-08-29
Attending: INTERNAL MEDICINE
Payer: MEDICARE

## 2023-08-29 ENCOUNTER — OFFICE VISIT (OUTPATIENT)
Dept: FAMILY MEDICINE | Facility: CLINIC | Age: 68
End: 2023-08-29
Payer: MEDICARE

## 2023-08-29 VITALS
OXYGEN SATURATION: 97 % | HEIGHT: 69 IN | DIASTOLIC BLOOD PRESSURE: 66 MMHG | SYSTOLIC BLOOD PRESSURE: 128 MMHG | HEART RATE: 72 BPM | WEIGHT: 171.94 LBS | TEMPERATURE: 98 F | BODY MASS INDEX: 25.47 KG/M2

## 2023-08-29 DIAGNOSIS — Z00.00 ROUTINE MEDICAL EXAM: Primary | ICD-10-CM

## 2023-08-29 DIAGNOSIS — N40.0 BENIGN PROSTATIC HYPERPLASIA, UNSPECIFIED WHETHER LOWER URINARY TRACT SYMPTOMS PRESENT: ICD-10-CM

## 2023-08-29 DIAGNOSIS — E78.5 HYPERLIPIDEMIA, UNSPECIFIED HYPERLIPIDEMIA TYPE: ICD-10-CM

## 2023-08-29 DIAGNOSIS — Z12.5 SCREENING FOR PROSTATE CANCER: ICD-10-CM

## 2023-08-29 DIAGNOSIS — E11.9 CONTROLLED TYPE 2 DIABETES MELLITUS WITHOUT COMPLICATION, WITHOUT LONG-TERM CURRENT USE OF INSULIN: ICD-10-CM

## 2023-08-29 DIAGNOSIS — I10 PRIMARY HYPERTENSION: ICD-10-CM

## 2023-08-29 DIAGNOSIS — D64.9 ANEMIA, UNSPECIFIED TYPE: ICD-10-CM

## 2023-08-29 DIAGNOSIS — I70.0 ATHEROSCLEROSIS OF AORTA: ICD-10-CM

## 2023-08-29 DIAGNOSIS — Z86.010 HISTORY OF COLONIC POLYPS: ICD-10-CM

## 2023-08-29 LAB
BASOPHILS # BLD AUTO: 0.06 K/UL (ref 0–0.2)
BASOPHILS NFR BLD: 0.8 % (ref 0–1.9)
COMPLEXED PSA SERPL-MCNC: 2.4 NG/ML (ref 0–4)
DIFFERENTIAL METHOD: ABNORMAL
EOSINOPHIL # BLD AUTO: 0.1 K/UL (ref 0–0.5)
EOSINOPHIL NFR BLD: 1.6 % (ref 0–8)
ERYTHROCYTE [DISTWIDTH] IN BLOOD BY AUTOMATED COUNT: 14.2 % (ref 11.5–14.5)
ESTIMATED AVG GLUCOSE: 134 MG/DL (ref 68–131)
HBA1C MFR BLD: 6.3 % (ref 4–5.6)
HCT VFR BLD AUTO: 41.1 % (ref 40–54)
HGB BLD-MCNC: 13.7 G/DL (ref 14–18)
IMM GRANULOCYTES # BLD AUTO: 0.02 K/UL (ref 0–0.04)
IMM GRANULOCYTES NFR BLD AUTO: 0.3 % (ref 0–0.5)
LYMPHOCYTES # BLD AUTO: 1.4 K/UL (ref 1–4.8)
LYMPHOCYTES NFR BLD: 18.8 % (ref 18–48)
MCH RBC QN AUTO: 30 PG (ref 27–31)
MCHC RBC AUTO-ENTMCNC: 33.3 G/DL (ref 32–36)
MCV RBC AUTO: 90 FL (ref 82–98)
MONOCYTES # BLD AUTO: 0.8 K/UL (ref 0.3–1)
MONOCYTES NFR BLD: 10.6 % (ref 4–15)
NEUTROPHILS # BLD AUTO: 5.2 K/UL (ref 1.8–7.7)
NEUTROPHILS NFR BLD: 67.9 % (ref 38–73)
NRBC BLD-RTO: 0 /100 WBC
PLATELET # BLD AUTO: 313 K/UL (ref 150–450)
PMV BLD AUTO: 10.4 FL (ref 9.2–12.9)
RBC # BLD AUTO: 4.57 M/UL (ref 4.6–6.2)
WBC # BLD AUTO: 7.57 K/UL (ref 3.9–12.7)

## 2023-08-29 PROCEDURE — 1126F AMNT PAIN NOTED NONE PRSNT: CPT | Mod: HCNC,CPTII,S$GLB, | Performed by: INTERNAL MEDICINE

## 2023-08-29 PROCEDURE — 3008F BODY MASS INDEX DOCD: CPT | Mod: HCNC,CPTII,S$GLB, | Performed by: INTERNAL MEDICINE

## 2023-08-29 PROCEDURE — 1159F PR MEDICATION LIST DOCUMENTED IN MEDICAL RECORD: ICD-10-PCS | Mod: HCNC,CPTII,S$GLB, | Performed by: INTERNAL MEDICINE

## 2023-08-29 PROCEDURE — 99999 PR PBB SHADOW E&M-EST. PATIENT-LVL III: CPT | Mod: PBBFAC,HCNC,, | Performed by: INTERNAL MEDICINE

## 2023-08-29 PROCEDURE — 3288F PR FALLS RISK ASSESSMENT DOCUMENTED: ICD-10-PCS | Mod: HCNC,CPTII,S$GLB, | Performed by: INTERNAL MEDICINE

## 2023-08-29 PROCEDURE — 3008F PR BODY MASS INDEX (BMI) DOCUMENTED: ICD-10-PCS | Mod: HCNC,CPTII,S$GLB, | Performed by: INTERNAL MEDICINE

## 2023-08-29 PROCEDURE — 99397 PR PREVENTIVE VISIT,EST,65 & OVER: ICD-10-PCS | Mod: 25,HCNC,S$GLB, | Performed by: INTERNAL MEDICINE

## 2023-08-29 PROCEDURE — 99397 PER PM REEVAL EST PAT 65+ YR: CPT | Mod: 25,HCNC,S$GLB, | Performed by: INTERNAL MEDICINE

## 2023-08-29 PROCEDURE — 99999 PR PBB SHADOW E&M-EST. PATIENT-LVL III: ICD-10-PCS | Mod: PBBFAC,HCNC,, | Performed by: INTERNAL MEDICINE

## 2023-08-29 PROCEDURE — 85025 COMPLETE CBC W/AUTO DIFF WBC: CPT | Mod: HCNC | Performed by: INTERNAL MEDICINE

## 2023-08-29 PROCEDURE — 99214 OFFICE O/P EST MOD 30 MIN: CPT | Mod: 25,HCNC,S$GLB, | Performed by: INTERNAL MEDICINE

## 2023-08-29 PROCEDURE — 1101F PR PT FALLS ASSESS DOC 0-1 FALLS W/OUT INJ PAST YR: ICD-10-PCS | Mod: HCNC,CPTII,S$GLB, | Performed by: INTERNAL MEDICINE

## 2023-08-29 PROCEDURE — 1101F PT FALLS ASSESS-DOCD LE1/YR: CPT | Mod: HCNC,CPTII,S$GLB, | Performed by: INTERNAL MEDICINE

## 2023-08-29 PROCEDURE — 3044F HG A1C LEVEL LT 7.0%: CPT | Mod: HCNC,CPTII,S$GLB, | Performed by: INTERNAL MEDICINE

## 2023-08-29 PROCEDURE — 4010F PR ACE/ARB THEARPY RXD/TAKEN: ICD-10-PCS | Mod: HCNC,CPTII,S$GLB, | Performed by: INTERNAL MEDICINE

## 2023-08-29 PROCEDURE — 36415 COLL VENOUS BLD VENIPUNCTURE: CPT | Mod: HCNC,PO | Performed by: INTERNAL MEDICINE

## 2023-08-29 PROCEDURE — 84153 ASSAY OF PSA TOTAL: CPT | Mod: HCNC | Performed by: INTERNAL MEDICINE

## 2023-08-29 PROCEDURE — 3078F DIAST BP <80 MM HG: CPT | Mod: HCNC,CPTII,S$GLB, | Performed by: INTERNAL MEDICINE

## 2023-08-29 PROCEDURE — 1126F PR PAIN SEVERITY QUANTIFIED, NO PAIN PRESENT: ICD-10-PCS | Mod: HCNC,CPTII,S$GLB, | Performed by: INTERNAL MEDICINE

## 2023-08-29 PROCEDURE — 3044F PR MOST RECENT HEMOGLOBIN A1C LEVEL <7.0%: ICD-10-PCS | Mod: HCNC,CPTII,S$GLB, | Performed by: INTERNAL MEDICINE

## 2023-08-29 PROCEDURE — 99214 PR OFFICE/OUTPT VISIT, EST, LEVL IV, 30-39 MIN: ICD-10-PCS | Mod: 25,HCNC,S$GLB, | Performed by: INTERNAL MEDICINE

## 2023-08-29 PROCEDURE — 1159F MED LIST DOCD IN RCRD: CPT | Mod: HCNC,CPTII,S$GLB, | Performed by: INTERNAL MEDICINE

## 2023-08-29 PROCEDURE — 3288F FALL RISK ASSESSMENT DOCD: CPT | Mod: HCNC,CPTII,S$GLB, | Performed by: INTERNAL MEDICINE

## 2023-08-29 PROCEDURE — 3074F SYST BP LT 130 MM HG: CPT | Mod: HCNC,CPTII,S$GLB, | Performed by: INTERNAL MEDICINE

## 2023-08-29 PROCEDURE — 3074F PR MOST RECENT SYSTOLIC BLOOD PRESSURE < 130 MM HG: ICD-10-PCS | Mod: HCNC,CPTII,S$GLB, | Performed by: INTERNAL MEDICINE

## 2023-08-29 PROCEDURE — 83036 HEMOGLOBIN GLYCOSYLATED A1C: CPT | Mod: HCNC | Performed by: INTERNAL MEDICINE

## 2023-08-29 PROCEDURE — 3078F PR MOST RECENT DIASTOLIC BLOOD PRESSURE < 80 MM HG: ICD-10-PCS | Mod: HCNC,CPTII,S$GLB, | Performed by: INTERNAL MEDICINE

## 2023-08-29 PROCEDURE — 4010F ACE/ARB THERAPY RXD/TAKEN: CPT | Mod: HCNC,CPTII,S$GLB, | Performed by: INTERNAL MEDICINE

## 2023-08-29 RX ORDER — FERROUS GLUCONATE 324(38)MG
324 TABLET ORAL
COMMUNITY

## 2023-08-29 RX ORDER — VALSARTAN 160 MG/1
160 TABLET ORAL DAILY
Qty: 90 TABLET | Refills: 3 | Status: SHIPPED | OUTPATIENT
Start: 2023-08-29 | End: 2024-01-05 | Stop reason: SDUPTHER

## 2023-08-29 NOTE — PROGRESS NOTES
Chief complaint Physical  -    Patient is a 68  -year-old white male Here for physical.  2 normal colonoscopies , 2 POLYPS 6/ -5 yrs ..  PSA is due.        ROS:   CONST: weight stable. EYES: no vision change. ENT: no sore throat. CV: no chest pain w/ exertion. RESP: no shortness of breath. GI: no nausea, vomiting, diarrhea. No dysphagia. : no urinary issues. MUSCULOSKELETAL: no new myalgias or arthralgias. SKIN: no new changes. NEURO: no focal deficits. PSYCH: no new issues. ENDOCRINE: no polyuria. HEME: no lymph nodes. ALLERGY: no general pruritis.     PAST MEDICAL HISTORY:                                                        1.  Hyperlipidemia.                                                          2.  Back surgery for slipped disk, Dr. Sams.                            3.  History of ED, seen prior by Urology.                                    4.  History of suspicious stress test and atypical chest pain, normal cath  by Dr. Lugo, likely done at [facility].                            5.  Vasectomy.                                                               6.  Normal colonoscopy,  And , 2 POLYPS / -5 yrs                                              7.  Erosive gastropathy, outside EGD along with a normal emptying study.     8.  Slight anemia in , but did donate blood prior.  9.  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 right hip replacement- Dr Thomas  12.   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                                                                                                    FAMILY HISTORY:  Father  of lung cancer and coronary disease at 73.      Sister  of lung  cancer, mom  of breast cancer and she had diabetes.   A brother is apparently okay.                                                                                               Gen: no distress  EYES: conjunctiva clear, non-icteric, PERRL  ENT: nose clear, nasal mucosa normal, oropharynx clear and moist, teeth good  NECK:supple, thyroid non-palpable  RESP: effort is good, lungs clear  CV: heart RRR w/o murmur, gallops or rubs; no carotid bruits, no edema  MS: gait normal, no clubbing or cyanosis of the digits,   SKIN: No rashes, warm to touch    Dariusz was seen today for annual exam.    Diagnoses and all orders for this visit:    Routine medical exam  -update PSA    Screening for prostate cancer  -     Prostate Specific Antigen, Diagnostic; Future    History of colonic polyps,                                                               Additional evaluation and management issues:    Additionally patient has numerous other medical issues to address separate from his physical.  Patient's last A1c was 6.5 back in February so due for checkup and looks like he is due for yearly PSA.  All of the labs appear to be up-to-date.  Lipids are excellent apparently on pravastatin.      Apparently no further follow-up for his lung mass issue.  Reviewed interval pulmonary note.  Reviewed prior chest x-rays and CT scans.  His initial chest x-ray is not available as it was at [facility].  He was ultimately treated for both bacterial and fungal causes and symptoms all resolved.  No recurrence of any fevers chills night sweats a respiratory symptoms or cough.  Reviewed prior chest x-rays and CT scans and Infectious Disease notes.  We looked up the bacteria causing use issue on up-to-date although no natural history seen to explain how he may have acquired.    He did go on a diet also fasting one day per week and lost about some weight.  He does donate blood and may have done so a few weeks prior to the lab work where it shows he  had a slight hemoglobin reduction ..  He is up-to-date on his colon screening.    Patient also stopped his Lipitor an aspirin.  We did discuss his A1c above 6.8 and his diabetic  for which a statin would be recommended so we previously recommended restarting Pravachol and we reduced the dose from 80 mg down to 40 and I think that is reasonable.  Perhaps he was on the 80 in the past due to compliance issues.  Looks like his highest LDL was in the 150s and we would have to assume that was prior to treatment.  We discussed possible metformin.  We discussed looking up a diabetic diet on the Internet to make further adjustments but he is already probably on a very good diet.  His A1c did improved to 6.4            Also monitoring blood pressure at home and in the past was not perfect so we did increase his valsartan to 160 with good response.  Continue monitoring    Evaluation management of all the separate issues will be based on medical decision making as below                      Assessment and plan:          Controlled type 2 diabetes mellitus without complication, without long-term current use of insulin, due for A1c  -     Hemoglobin A1C; Future    Hyperlipidemia, unspecified hyperlipidemia type, chronic and stable on statin    Benign prostatic hyperplasia, unspecified whether lower urinary tract symptoms present, chronic and stable  -     Prostate Specific Antigen, Diagnostic; Future    Primary hypertension, chronic and stable    Atherosclerosis of aorta, chronic and stable    Anemia, unspecified type, reassess  -     CBC Auto Differential; Future    History of cavitary lung disease, responded to treatment      Other orders  -     valsartan (DIOVAN) 160 MG tablet; Take 1 tablet (160 mg total) by mouth once daily.

## 2023-09-03 NOTE — TELEPHONE ENCOUNTER
No care due was identified.  [facility] Embedded Care Due Messages. Reference number: [id number].   9/02/2023 8:20:31 PM CDT

## 2023-09-05 RX ORDER — FAMOTIDINE 20 MG/1
20 TABLET, FILM COATED ORAL 2 TIMES DAILY PRN
Qty: 180 TABLET | Refills: 1 | Status: SHIPPED | OUTPATIENT
Start: 2023-09-05 | End: 2024-01-05 | Stop reason: SDUPTHER

## 2023-09-05 NOTE — TELEPHONE ENCOUNTER
Refill Decision Note   Dariusz Urbina  is requesting a refill authorization.  Brief Assessment and Rationale for Refill:  Approve     Medication Therapy Plan:         Comments:     Note composed:1:54 PM 09/05/2023

## 2023-09-14 ENCOUNTER — PATIENT MESSAGE (OUTPATIENT)
Dept: FAMILY MEDICINE | Facility: CLINIC | Age: 68
End: 2023-09-14
Payer: MEDICARE

## 2023-09-14 NOTE — TELEPHONE ENCOUNTER
Please contact his wife about scheduling an appointment to establish care with me     Patient Information    Patient Name   Chinmay Urbina Legal Sex   Female    1960 [facility]        Patient Demographics    Address   [address] [place] [address] Phone   [phone] (Home)   [phone] (Mobile) *Preferred*     
hypoglycemic, elevated HR, low 02

## 2023-09-30 RX ORDER — PRAVASTATIN SODIUM 40 MG/1
TABLET ORAL
Qty: 90 TABLET | Refills: 1 | Status: SHIPPED | OUTPATIENT
Start: 2023-09-30 | End: 2023-11-10 | Stop reason: SDUPTHER

## 2023-09-30 NOTE — TELEPHONE ENCOUNTER
Refill Authorization Note     Refill Decision Note   Dariusz Urbina  is requesting a refill authorization.  Brief Assessment and Rationale for Refill:  Approve     Medication Therapy Plan:       Medication Reconciliation Completed: No   Comments:     No Care Gaps recommended.     Note composed:3:48 PM 09/30/2023

## 2023-09-30 NOTE — TELEPHONE ENCOUNTER
No care due was identified.  [facility] Embedded Care Due Messages. Reference number: [id number].   9/30/2023 12:09:47 PM CDT

## 2023-10-18 ENCOUNTER — PATIENT OUTREACH (OUTPATIENT)
Dept: ADMINISTRATIVE | Facility: HOSPITAL | Age: 68
End: 2023-10-18
Payer: MEDICARE

## 2023-11-10 RX ORDER — VALSARTAN 160 MG/1
160 TABLET ORAL DAILY
Qty: 90 TABLET | Refills: 3 | Status: CANCELLED | OUTPATIENT
Start: 2023-11-10 | End: 2024-11-09

## 2023-11-10 NOTE — TELEPHONE ENCOUNTER
No care due was identified.  [facility] Embedded Care Due Messages. Reference number: [id number].   11/10/2023 11:49:26 AM CST

## 2023-11-14 RX ORDER — PRAVASTATIN SODIUM 40 MG/1
40 TABLET ORAL DAILY
Qty: 90 TABLET | Refills: 1 | Status: SHIPPED | OUTPATIENT
Start: 2023-11-14 | End: 2023-12-21 | Stop reason: ALTCHOICE

## 2023-11-22 ENCOUNTER — OFFICE VISIT (OUTPATIENT)
Dept: URGENT CARE | Facility: CLINIC | Age: 68
End: 2023-11-22
Payer: MEDICARE

## 2023-11-22 VITALS
DIASTOLIC BLOOD PRESSURE: 79 MMHG | HEART RATE: 71 BPM | SYSTOLIC BLOOD PRESSURE: 159 MMHG | HEIGHT: 69 IN | WEIGHT: 171 LBS | TEMPERATURE: 98 F | OXYGEN SATURATION: 96 % | BODY MASS INDEX: 25.33 KG/M2 | RESPIRATION RATE: 20 BRPM

## 2023-11-22 DIAGNOSIS — R05.9 COUGH, UNSPECIFIED TYPE: Primary | ICD-10-CM

## 2023-11-22 DIAGNOSIS — R09.82 POSTNASAL DRIP: ICD-10-CM

## 2023-11-22 LAB
CTP QC/QA: YES
SARS-COV-2 AG RESP QL IA.RAPID: NEGATIVE

## 2023-11-22 PROCEDURE — 99213 OFFICE O/P EST LOW 20 MIN: CPT | Mod: S$GLB,,,

## 2023-11-22 PROCEDURE — 99213 PR OFFICE/OUTPT VISIT, EST, LEVL III, 20-29 MIN: ICD-10-PCS | Mod: S$GLB,,,

## 2023-11-22 PROCEDURE — 87811 SARS-COV-2 COVID19 W/OPTIC: CPT | Mod: QW,S$GLB,,

## 2023-11-22 PROCEDURE — 87811 SARS CORONAVIRUS 2 ANTIGEN POCT, MANUAL READ: ICD-10-PCS | Mod: QW,S$GLB,,

## 2023-11-22 RX ORDER — FLUTICASONE PROPIONATE 50 MCG
1 SPRAY, SUSPENSION (ML) NASAL DAILY
Qty: 16 G | Refills: 0 | Status: SHIPPED | OUTPATIENT
Start: 2023-11-22 | End: 2023-12-31

## 2023-11-22 RX ORDER — BENZONATATE 200 MG/1
200 CAPSULE ORAL 3 TIMES DAILY PRN
Qty: 30 CAPSULE | Refills: 0 | Status: SHIPPED | OUTPATIENT
Start: 2023-11-22 | End: 2023-12-02

## 2023-11-22 NOTE — PATIENT INSTRUCTIONS

## 2023-11-22 NOTE — PROGRESS NOTES
"Subjective:      Patient ID: Dariusz Urbina is a 68 y.o. male.    Vitals:  height is 5' 9" (1.753 m) and weight is 77.6 kg (171 lb). His tympanic temperature is 97.8 °F (36.6 °C). His blood pressure is 159/79 (abnormal) and his pulse is 71. His respiration is 20 and oxygen saturation is 96%.     Chief Complaint: Sinus Problem    Pt is a 67 y/o M who presents with postnasal drip and coughing x7 days. Denies fever, chills, CP, SoB, n/v/d, sore throat, abdominal pain, ear pain, dizziness.    Sinus Problem  This is a new problem. The current episode started in the past 7 days. The problem is unchanged. There has been no fever. His pain is at a severity of 0/10. He is experiencing no pain. Associated symptoms include coughing. Pertinent negatives include no chills, congestion, ear pain, headaches, neck pain, shortness of breath or sneezing. Past treatments include acetaminophen. The treatment provided no relief.       Constitution: Negative for chills and fever.   HENT:  Positive for postnasal drip. Negative for ear pain and congestion.    Neck: Negative for neck pain.   Cardiovascular:  Negative for chest pain.   Respiratory:  Positive for cough. Negative for shortness of breath.    Gastrointestinal:  Negative for abdominal pain, nausea, vomiting and diarrhea.   Musculoskeletal:  Negative for muscle ache.   Skin:  Negative for rash.   Allergic/Immunologic: Negative for sneezing.   Neurological:  Negative for dizziness and headaches.      Objective:     Physical Exam   Constitutional: He is oriented to person, place, and time. He appears well-developed.   HENT:   Head: Normocephalic and atraumatic.   Ears:   Right Ear: External ear normal.   Left Ear: External ear normal.   Nose: Nose normal.   Mouth/Throat: Oropharynx is clear and moist. Mucous membranes are moist. Oropharynx is clear.   Eyes: Conjunctivae, EOM and lids are normal. Pupils are equal, round, and reactive to light.   Neck: Trachea normal and phonation " normal. Neck supple.   Cardiovascular: Normal rate, regular rhythm, normal heart sounds and normal pulses.   Pulmonary/Chest: Effort normal and breath sounds normal. No respiratory distress. He has no wheezes. He has no rales.   Musculoskeletal: Normal range of motion.         General: Normal range of motion.   Neurological: He is alert and oriented to person, place, and time.   Skin: Skin is warm, dry and intact.   Psychiatric: His speech is normal and behavior is normal. Judgment and thought content normal.   Nursing note and vitals reviewed.    Results for orders placed or performed in visit on 11/22/23   SARS Coronavirus 2 Antigen, POCT Manual Read   Result Value Ref Range    SARS Coronavirus 2 Antigen Negative Negative     Acceptable Yes      Assessment:     1. Cough, unspecified type    2. Postnasal drip        Plan:     Cough, unspecified type  -     SARS Coronavirus 2 Antigen, POCT Manual Read  -     benzonatate (TESSALON) 200 MG capsule; Take 1 capsule (200 mg total) by mouth 3 (three) times daily as needed for Cough.  Dispense: 30 capsule; Refill: 0    Postnasal drip  -     fluticasone propionate (FLONASE) 50 mcg/actuation nasal spray; 1 spray (50 mcg total) by Each Nostril route once daily.  Dispense: 16 g; Refill: 0              Patient Instructions   - Rest.    - Drink plenty of fluids.  - Viral upper respiratory infections typically run their course in 10-14 days.      - You can take over-the-counter claritin, zyrtec, allegra, or xyzal as directed. These are antihistamines that can help with runny nose, nasal congestion, sneezing, and helps to dry up post-nasal drip, which usually causes sore throat and cough.              - If you do NOT have high blood pressure, you may use a decongestant form (D)  of this medication (ie. Claritin- D, zyrtec-D, allegra-D) or if you do not take the D form, you can take sudafed (pseudoephedrine) over the counter, which is a decongestant. Do NOT take two  decongestant (D) medications at the same time (such as mucinex-D and claritin-D or plain sudafed and claritin D)    - If you DO have Hypertension, anxiety, or palpitations, it is safe to take Coricidin HBP for relief of sinus symptoms.     - You can use Flonase (fluticasone) nasal spray as directed for sinus congestion and postnasal drip. This is a steroid nasal spray that works locally over time to decrease the inflammation in your nose/sinuses and help with allergic symptoms. This is not an quick- relief spray like afrin, but it works well if used daily.  Discontinue if you develop nose bleed  - use nasal saline prior to Flonase.  - Use Ocean Spray Nasal Saline 1-3 puffs each nostril every 2-3 hours then blow out onto tissue. This is to irrigate the nasal passage way to clear the sinus openings. Use until sinus problem resolved.     - you can take plain Mucinex (guaifenesin) 1200 mg twice a day to help loosen mucous.      -warm salt water gargles can help with sore throat     - warm tea with honey can help with cough. Honey is a natural cough suppressant.     - Dextromethorphan (DM) is a cough suppressant over the counter (ie. mucinex DM, robitussin, delsym; dayquil/nyquil has DM as well.)        - Follow up with your PCP or specialty clinic as directed in the next 1-2 weeks if not improved or as needed.  You can call [phone] to schedule an appointment with the appropriate provider.       - Go to the ER if you develop new or worsening symptoms.      - You must understand that you have received an Urgent Care treatment only and that you may be released before all of your medical problems are known or treated.   - You, the patient, will arrange for follow up care as instructed.   - If your condition worsens or fails to improve we recommend that you receive another evaluation at the ER immediately or contact your PCP to discuss your concerns or return here.

## 2023-12-11 ENCOUNTER — E-VISIT (OUTPATIENT)
Dept: FAMILY MEDICINE | Facility: CLINIC | Age: 68
End: 2023-12-11
Payer: MEDICARE

## 2023-12-11 DIAGNOSIS — M54.31 RIGHT SIDED SCIATICA: Primary | ICD-10-CM

## 2023-12-11 DIAGNOSIS — E11.9 CONTROLLED TYPE 2 DIABETES MELLITUS WITHOUT COMPLICATION, WITHOUT LONG-TERM CURRENT USE OF INSULIN: ICD-10-CM

## 2023-12-11 PROCEDURE — 99423 OL DIG E/M SVC 21+ MIN: CPT | Mod: ,,, | Performed by: INTERNAL MEDICINE

## 2023-12-11 PROCEDURE — 99423 PR E&M, ONLINE DIGIT, EST, < 7 DAYS,  21+ MINS: ICD-10-PCS | Mod: ,,, | Performed by: INTERNAL MEDICINE

## 2023-12-11 NOTE — PROGRESS NOTES
Patient ID: Dariusz Urbina is a 68 y.o. male.    Chief Complaint: Back Pain (Entered automatically based on patient selection in Patient Portal.)    The patient initiated a request through [employer] on 12/11/2023 for evaluation and management with a chief complaint of Back Pain (Entered automatically based on patient selection in Patient Portal.)     I evaluated the questionnaire submission on Today.    [facility] Peq Evisit Back Pain    12/11/2023  4:24 PM CST - Filed by Patient   Do you agree to participate in an E-Visit? Yes   If you have any of the following symptoms, please present to your local ER or call 911: I acknowledge   What is the main issue that you would like for your doctor to address today? Lower back pain   Are you able to take your vital signs? No   Where are you having pain? Buttocks   Does the pain extend into your legs? Yes, into my right leg   How bad is the pain? The pain is moderate   Did you have an injury that caused the pain? No, I cannot remember an injury   How long has the pain been present? More than 1 week but less then 4 weeks   Have you had back pain in the past? Yes, I have infrequently had pain similar to this before   Please list any medications or treatments you have used for back pain and indicate if it was effective or not. Pain meds   Do you have a fever? No, I do not have a fever   Do you have any of the following? None of the above   What makes the pain worse? None of the above   What makes the pain better? Lying in bed;  Prescription pain medicine;  Over the counter pain medicine   Have you ever been diagnosed with cancer? No   Have you ever been diagnosed with degenerative disc disease (arthritis of the spine)? No   Have you ever been diagnosed with osteoporosis or any other bone weakness? No   Have you ever had surgery on your back or spine? Yes   What is your usual health status? I am active and can move normally   Provide any information you feel is important to your history  not asked above    Please attach any relevant images or files          Recent Labs Obtained:  No visits with results within 7 Day(s) from this visit.   Latest known visit with results is:   Office Visit on 11/22/2023   Component Date Value Ref Range Status    SARS Coronavirus 2 Antigen 11/22/2023 Negative  Negative Final     Acceptable 11/22/2023 Yes   Final       Encounter Diagnosis   Name Primary?    Right sided sciatica Yes        No orders of the defined types were placed in this encounter.           No follow-ups on file.      E-Visit Time Tracking:  Over 25 minutes reviewing symptom questionnaire, chart and awaiting patient to reply back with greater details.

## 2023-12-13 ENCOUNTER — TELEPHONE (OUTPATIENT)
Dept: FAMILY MEDICINE | Facility: CLINIC | Age: 68
End: 2023-12-13
Payer: MEDICARE

## 2023-12-13 ENCOUNTER — PATIENT MESSAGE (OUTPATIENT)
Dept: ADMINISTRATIVE | Facility: OTHER | Age: 68
End: 2023-12-13
Payer: MEDICARE

## 2023-12-13 NOTE — TELEPHONE ENCOUNTER
----- Message from Oriana James sent at 12/13/2023 12:25 PM CST -----  Regarding: self [phone]  Type: Patient Call Back    Who called: self    What is the request in detail: pt is needing to schedule AWV.     Can the clinic reply by [facility]?no     Would the patient rather a call back or a response via [facility]? Call back     Best call back number: [phone]

## 2023-12-14 ENCOUNTER — LAB VISIT (OUTPATIENT)
Dept: LAB | Facility: HOSPITAL | Age: 68
End: 2023-12-14
Attending: INTERNAL MEDICINE
Payer: MEDICARE

## 2023-12-14 DIAGNOSIS — E11.9 CONTROLLED TYPE 2 DIABETES MELLITUS WITHOUT COMPLICATION, WITHOUT LONG-TERM CURRENT USE OF INSULIN: ICD-10-CM

## 2023-12-14 DIAGNOSIS — E78.49 OTHER HYPERLIPIDEMIA: ICD-10-CM

## 2023-12-14 LAB
ALBUMIN/CREAT UR: 46.8 UG/MG (ref 0–30)
CHOLEST SERPL-MCNC: 225 MG/DL (ref 120–199)
CHOLEST/HDLC SERPL: 4.8 {RATIO} (ref 2–5)
CREAT UR-MCNC: 158 MG/DL (ref 23–375)
HDLC SERPL-MCNC: 47 MG/DL (ref 40–75)
HDLC SERPL: 20.9 % (ref 20–50)
LDLC SERPL CALC-MCNC: 137 MG/DL (ref 63–159)
MICROALBUMIN UR DL<=1MG/L-MCNC: 74 UG/ML
NONHDLC SERPL-MCNC: 178 MG/DL
TRIGL SERPL-MCNC: 205 MG/DL (ref 30–150)

## 2023-12-14 PROCEDURE — 36415 COLL VENOUS BLD VENIPUNCTURE: CPT | Mod: HCNC,PO | Performed by: INTERNAL MEDICINE

## 2023-12-14 PROCEDURE — 82570 ASSAY OF URINE CREATININE: CPT | Mod: HCNC | Performed by: INTERNAL MEDICINE

## 2023-12-14 PROCEDURE — 80061 LIPID PANEL: CPT | Mod: HCNC | Performed by: INTERNAL MEDICINE

## 2023-12-19 ENCOUNTER — OFFICE VISIT (OUTPATIENT)
Dept: PODIATRY | Facility: CLINIC | Age: 68
End: 2023-12-19
Payer: MEDICARE

## 2023-12-19 VITALS
SYSTOLIC BLOOD PRESSURE: 150 MMHG | WEIGHT: 171.06 LBS | HEIGHT: 69 IN | HEART RATE: 96 BPM | BODY MASS INDEX: 25.34 KG/M2 | DIASTOLIC BLOOD PRESSURE: 79 MMHG

## 2023-12-19 DIAGNOSIS — E11.9 CONTROLLED TYPE 2 DIABETES MELLITUS WITHOUT COMPLICATION, WITHOUT LONG-TERM CURRENT USE OF INSULIN: Primary | ICD-10-CM

## 2023-12-19 PROCEDURE — 4010F ACE/ARB THERAPY RXD/TAKEN: CPT | Mod: HCNC,CPTII,S$GLB, | Performed by: PODIATRIST

## 2023-12-19 PROCEDURE — 1126F AMNT PAIN NOTED NONE PRSNT: CPT | Mod: HCNC,CPTII,S$GLB, | Performed by: PODIATRIST

## 2023-12-19 PROCEDURE — 99203 OFFICE O/P NEW LOW 30 MIN: CPT | Mod: HCNC,S$GLB,, | Performed by: PODIATRIST

## 2023-12-19 PROCEDURE — 3078F DIAST BP <80 MM HG: CPT | Mod: HCNC,CPTII,S$GLB, | Performed by: PODIATRIST

## 2023-12-19 PROCEDURE — 1159F MED LIST DOCD IN RCRD: CPT | Mod: HCNC,CPTII,S$GLB, | Performed by: PODIATRIST

## 2023-12-19 PROCEDURE — 3066F PR DOCUMENTATION OF TREATMENT FOR NEPHROPATHY: ICD-10-PCS | Mod: HCNC,CPTII,S$GLB, | Performed by: PODIATRIST

## 2023-12-19 PROCEDURE — 3077F SYST BP >= 140 MM HG: CPT | Mod: HCNC,CPTII,S$GLB, | Performed by: PODIATRIST

## 2023-12-19 PROCEDURE — 3044F PR MOST RECENT HEMOGLOBIN A1C LEVEL <7.0%: ICD-10-PCS | Mod: HCNC,CPTII,S$GLB, | Performed by: PODIATRIST

## 2023-12-19 PROCEDURE — 3066F NEPHROPATHY DOC TX: CPT | Mod: HCNC,CPTII,S$GLB, | Performed by: PODIATRIST

## 2023-12-19 PROCEDURE — 3008F PR BODY MASS INDEX (BMI) DOCUMENTED: ICD-10-PCS | Mod: HCNC,CPTII,S$GLB, | Performed by: PODIATRIST

## 2023-12-19 PROCEDURE — 3077F PR MOST RECENT SYSTOLIC BLOOD PRESSURE >= 140 MM HG: ICD-10-PCS | Mod: HCNC,CPTII,S$GLB, | Performed by: PODIATRIST

## 2023-12-19 PROCEDURE — 3008F BODY MASS INDEX DOCD: CPT | Mod: HCNC,CPTII,S$GLB, | Performed by: PODIATRIST

## 2023-12-19 PROCEDURE — 3078F PR MOST RECENT DIASTOLIC BLOOD PRESSURE < 80 MM HG: ICD-10-PCS | Mod: HCNC,CPTII,S$GLB, | Performed by: PODIATRIST

## 2023-12-19 PROCEDURE — 99999 PR PBB SHADOW E&M-EST. PATIENT-LVL III: ICD-10-PCS | Mod: PBBFAC,HCNC,, | Performed by: PODIATRIST

## 2023-12-19 PROCEDURE — 3060F PR POS MICROALBUMINURIA RESULT DOCUMENTED/REVIEW: ICD-10-PCS | Mod: HCNC,CPTII,S$GLB, | Performed by: PODIATRIST

## 2023-12-19 PROCEDURE — 4010F PR ACE/ARB THEARPY RXD/TAKEN: ICD-10-PCS | Mod: HCNC,CPTII,S$GLB, | Performed by: PODIATRIST

## 2023-12-19 PROCEDURE — 3060F POS MICROALBUMINURIA REV: CPT | Mod: HCNC,CPTII,S$GLB, | Performed by: PODIATRIST

## 2023-12-19 PROCEDURE — 1126F PR PAIN SEVERITY QUANTIFIED, NO PAIN PRESENT: ICD-10-PCS | Mod: HCNC,CPTII,S$GLB, | Performed by: PODIATRIST

## 2023-12-19 PROCEDURE — 1101F PT FALLS ASSESS-DOCD LE1/YR: CPT | Mod: HCNC,CPTII,S$GLB, | Performed by: PODIATRIST

## 2023-12-19 PROCEDURE — 99203 PR OFFICE/OUTPT VISIT, NEW, LEVL III, 30-44 MIN: ICD-10-PCS | Mod: HCNC,S$GLB,, | Performed by: PODIATRIST

## 2023-12-19 PROCEDURE — 3288F PR FALLS RISK ASSESSMENT DOCUMENTED: ICD-10-PCS | Mod: HCNC,CPTII,S$GLB, | Performed by: PODIATRIST

## 2023-12-19 PROCEDURE — 1159F PR MEDICATION LIST DOCUMENTED IN MEDICAL RECORD: ICD-10-PCS | Mod: HCNC,CPTII,S$GLB, | Performed by: PODIATRIST

## 2023-12-19 PROCEDURE — 1101F PR PT FALLS ASSESS DOC 0-1 FALLS W/OUT INJ PAST YR: ICD-10-PCS | Mod: HCNC,CPTII,S$GLB, | Performed by: PODIATRIST

## 2023-12-19 PROCEDURE — 3288F FALL RISK ASSESSMENT DOCD: CPT | Mod: HCNC,CPTII,S$GLB, | Performed by: PODIATRIST

## 2023-12-19 PROCEDURE — 99999 PR PBB SHADOW E&M-EST. PATIENT-LVL III: CPT | Mod: PBBFAC,HCNC,, | Performed by: PODIATRIST

## 2023-12-19 PROCEDURE — 3044F HG A1C LEVEL LT 7.0%: CPT | Mod: HCNC,CPTII,S$GLB, | Performed by: PODIATRIST

## 2023-12-19 NOTE — PROGRESS NOTES
Subjective:     Patient ID: Dariusz Urbina is a 68 y.o. male.    Chief Complaint: Diabetes Mellitus (pre) and Diabetic Foot Exam (12/11/23 Dr Espino)    Dariusz is a 68 y.o. male who presents to the clinic upon referral from Dr. Merrill  for evaluation and treatment of diabetic feet. Dariusz has a past medical history of Controlled type 2 diabetes mellitus without complication, without long-term current use of insulin (2/8/2023), Decreased libido (11/11/2013), Dermatitis (10/12/2012), Hyperlipidemia (11/11/2013), and Hypertension. Presents for diabetic foot risk assessment.     PCP: Nils Espino MD    Date Last Seen by PCP: per above    Current shoe gear: Tennis shoes    Hemoglobin A1C   Date Value Ref Range Status   08/29/2023 6.3 (H) 4.0 - 5.6 % Final     Comment:     ADA Screening Guidelines:  5.7-6.4%  Consistent with prediabetes  >or=6.5%  Consistent with diabetes    High levels of fetal hemoglobin interfere with the HbA1C  assay. Heterozygous hemoglobin variants (HbS, HgC, etc)do  not significantly interfere with this assay.   However, presence of multiple variants may affect accuracy.     02/08/2023 6.5 (H) 4.0 - 5.6 % Final     Comment:     ADA Screening Guidelines:  5.7-6.4%  Consistent with prediabetes  >or=6.5%  Consistent with diabetes    High levels of fetal hemoglobin interfere with the HbA1C  assay. Heterozygous hemoglobin variants (HbS, HgC, etc)do  not significantly interfere with this assay.   However, presence of multiple variants may affect accuracy.     11/11/2020 6.4 (H) 4.0 - 5.6 % Final     Comment:     ADA Screening Guidelines:  5.7-6.4%  Consistent with prediabetes  >or=6.5%  Consistent with diabetes  High levels of fetal hemoglobin interfere with the HbA1C  assay. Heterozygous hemoglobin variants (HbS, HgC, etc)do  not significantly interfere with this assay.   However, presence of multiple variants may affect accuracy.           Review of Systems   Constitutional: Negative for chills.    Cardiovascular:  Negative for chest pain and claudication.   Respiratory:  Negative for cough.    Skin:  Positive for color change, dry skin and nail changes.   Musculoskeletal:  Positive for joint pain.   Gastrointestinal:  Negative for nausea.   Neurological:  Positive for paresthesias. Negative for numbness.   Psychiatric/Behavioral:  The patient is not nervous/anxious.         Objective:     Physical Exam  Constitutional:       Appearance: He is well-developed.      Comments: Oriented to time, place, and person.   Cardiovascular:      Comments: DP and PT pulses are palpable bilaterally. 3 sec capillary refill time and toes and feet are warm to touch proximally .  There is  hair growth on the feet and toes b/l. There is no edema b/l. No spider veins or varicosities present b/l.     Musculoskeletal:      Comments: Equinus noted b/l ankles with < 10 deg DF noted. MMT 5/5 in DF/PF/Inv/Ev resistance with no reproduction of pain in any direction. Passive range of motion of ankle and pedal joints is painless b/l.     Feet:      Right foot:      Skin integrity: No callus or dry skin.      Left foot:      Skin integrity: No callus or dry skin.   Lymphadenopathy:      Comments: Negative lymphadenopathy bilateral popliteal fossa and tarsal tunnel.   Skin:     Comments: No open lesions, lacerations or wounds noted.Interdigital spaces clean, dry and intact b/l. No erythema noted to b/l foot.  Nails normal color and trophic qualities.     Neurological:      Mental Status: He is alert.      Comments: Light touch, proprioception, and sharp/dull sensation are all intact bilaterally. Protective threshold with the [place]-Wienstein monofilament is intact bilaterally.    Psychiatric:         Behavior: Behavior is cooperative.           Assessment:      Encounter Diagnosis   Name Primary?    Controlled type 2 diabetes mellitus without complication, without long-term current use of insulin Yes     Plan:     Dariusz was seen today for  diabetes mellitus and diabetic foot exam.    Diagnoses and all orders for this visit:    Controlled type 2 diabetes mellitus without complication, without long-term current use of insulin      I counseled the patient on his conditions, their implications and medical management.      - Shoe inspection. Diabetic Foot Education. Patient reminded of the importance of good nutrition and blood sugar control to help prevent podiatric complications of diabetes. Patient instructed on proper foot hygeine. We discussed wearing proper shoe gear, daily foot inspections, never walking without protective shoe gear, caution putting sharp instruments to feet     - Discussed DM foot care:  Wear comfortable, proper fitting shoes. Wash feet daily. Dry well. After drying, apply moisturizer to feet (no lotion to webspaces). Inspect feet daily for skin breaks, blisters, swelling, or redness. Wear cotton socks (preferably white)  Change socks every day. Do NOT walk barefoot. Do NOT use heating pads or warm/hot water soaks     F/u one year DM foot exam sooner PRN

## 2023-12-22 ENCOUNTER — PATIENT OUTREACH (OUTPATIENT)
Dept: ADMINISTRATIVE | Facility: HOSPITAL | Age: 68
End: 2023-12-22
Payer: MEDICARE

## 2023-12-22 ENCOUNTER — PATIENT MESSAGE (OUTPATIENT)
Dept: ADMINISTRATIVE | Facility: HOSPITAL | Age: 68
End: 2023-12-22
Payer: MEDICARE

## 2023-12-31 ENCOUNTER — HOSPITAL ENCOUNTER (OUTPATIENT)
Dept: RADIOLOGY | Facility: HOSPITAL | Age: 68
Discharge: HOME | End: 2023-12-31
Payer: MEDICARE

## 2023-12-31 ENCOUNTER — OFFICE VISIT (OUTPATIENT)
Dept: URGENT CARE | Facility: CLINIC | Age: 68
End: 2023-12-31
Payer: MEDICARE

## 2023-12-31 VITALS
BODY MASS INDEX: 25.33 KG/M2 | RESPIRATION RATE: 18 BRPM | OXYGEN SATURATION: 98 % | HEART RATE: 81 BPM | WEIGHT: 171 LBS | TEMPERATURE: 98 F | HEIGHT: 69 IN | DIASTOLIC BLOOD PRESSURE: 82 MMHG | SYSTOLIC BLOOD PRESSURE: 173 MMHG

## 2023-12-31 DIAGNOSIS — J20.9 ACUTE BRONCHITIS, UNSPECIFIED ORGANISM: Primary | ICD-10-CM

## 2023-12-31 DIAGNOSIS — R05.9 COUGH, UNSPECIFIED TYPE: ICD-10-CM

## 2023-12-31 DIAGNOSIS — R09.82 POSTNASAL DRIP: ICD-10-CM

## 2023-12-31 PROCEDURE — 71046 X-RAY EXAM CHEST 2 VIEWS: CPT | Mod: 26,HCNC,, | Performed by: RADIOLOGY

## 2023-12-31 PROCEDURE — 71046 X-RAY EXAM CHEST 2 VIEWS: CPT | Mod: TC,HCNC,FY

## 2023-12-31 PROCEDURE — 99213 OFFICE O/P EST LOW 20 MIN: CPT | Mod: S$GLB,,,

## 2023-12-31 PROCEDURE — 99213 PR OFFICE/OUTPT VISIT, EST, LEVL III, 20-29 MIN: ICD-10-PCS | Mod: S$GLB,,,

## 2023-12-31 RX ORDER — FLUTICASONE PROPIONATE 50 MCG
1 SPRAY, SUSPENSION (ML) NASAL DAILY
Qty: 15.8 ML | Refills: 0 | Status: SHIPPED | OUTPATIENT
Start: 2023-12-31

## 2023-12-31 RX ORDER — PROMETHAZINE HYDROCHLORIDE AND DEXTROMETHORPHAN HYDROBROMIDE 6.25; 15 MG/5ML; MG/5ML
5 SYRUP ORAL EVERY 6 HOURS PRN
Qty: 120 ML | Refills: 0 | Status: SHIPPED | OUTPATIENT
Start: 2023-12-31

## 2023-12-31 RX ORDER — AZELASTINE 1 MG/ML
1 SPRAY, METERED NASAL 2 TIMES DAILY
Qty: 30 ML | Refills: 0 | Status: SHIPPED | OUTPATIENT
Start: 2023-12-31 | End: 2024-12-30

## 2023-12-31 NOTE — PROGRESS NOTES
"Subjective:      Patient ID: Dariusz Urbina is a 68 y.o. male.    Vitals:  height is 5' 9" (1.753 m) and weight is 77.6 kg (171 lb). His oral temperature is 98 °F (36.7 °C). His blood pressure is 173/82 (abnormal) and his pulse is 81. His respiration is 18 and oxygen saturation is 98%.     Chief Complaint: Cough    Pt is a 68-year-old male presenting with chest congestion, productive coughing, nasal congestion, postnasal drip for the past 3 weeks.  He did have a cough about 5 weeks ago.  This lasted about 1 week before improving.  Patient reports few days later, he began having symptoms again.  Denies fever, chills, headaches, body aches, nausea, vomiting, chest pain, SOB, wheezing, hemoptysis.    Cough  This is a new problem. The current episode started 1 to 4 weeks ago. The problem has been unchanged. The problem occurs constantly. The cough is Non-productive. Associated symptoms include nasal congestion and postnasal drip. Pertinent negatives include no chest pain, chills, ear pain, fever, headaches, hemoptysis, myalgias, rash, rhinorrhea, sore throat, shortness of breath, sweats, weight loss or wheezing. His past medical history is significant for pneumonia.       Constitution: Negative for chills and fever.   HENT:  Positive for congestion and postnasal drip. Negative for ear pain and sore throat.    Neck: Negative for neck pain.   Cardiovascular:  Negative for chest pain.   Respiratory:  Positive for cough and sputum production. Negative for chest tightness, bloody sputum, shortness of breath and wheezing.    Gastrointestinal:  Negative for abdominal pain, nausea, vomiting and diarrhea.   Musculoskeletal:  Negative for muscle ache.   Skin:  Negative for rash.   Neurological:  Negative for dizziness and headaches.      Objective:     Physical Exam   Constitutional: He is oriented to person, place, and time. He appears well-developed.   HENT:   Head: Normocephalic and atraumatic.   Ears:   Right Ear: Tympanic " membrane, external ear and ear canal normal.   Left Ear: Tympanic membrane, external ear and ear canal normal.   Nose: Congestion present.   Mouth/Throat: Oropharynx is clear and moist. Mucous membranes are moist. Oropharynx is clear.   Eyes: Conjunctivae, EOM and lids are normal. Pupils are equal, round, and reactive to light.   Neck: Trachea normal and phonation normal. Neck supple.   Cardiovascular: Normal rate, regular rhythm, normal heart sounds and normal pulses.   Pulmonary/Chest: Effort normal and breath sounds normal. No respiratory distress. He has no wheezes. He has no rales.   Musculoskeletal: Normal range of motion.         General: Normal range of motion.   Neurological: no focal deficit. He is alert and oriented to person, place, and time.   Skin: Skin is warm, dry and intact. Capillary refill takes less than 2 seconds.   Psychiatric: His speech is normal and behavior is normal. Judgment and thought content normal.   Nursing note and vitals reviewed.    XR CHEST PA AND LATERAL    Result Date: 12/31/2023  EXAMINATION: XR CHEST PA AND LATERAL CLINICAL HISTORY: Cough, unspecified TECHNIQUE: PA and lateral views of the chest were performed. COMPARISON: 08/22/2021 FINDINGS: There is left basilar discoid atelectasis/scarring.  There is no consolidation, effusion, or pneumothorax.  Cardiomediastinal silhouette is unremarkable.  Regional osseous structures are unremarkable.     No acute cardiopulmonary process. Electronically signed by: Ruslan Moreland MD Date:    12/31/2023 Time:    10:51       Assessment:     1. Acute bronchitis, unspecified organism    2. Postnasal drip        Plan:     Acute bronchitis, unspecified organism  -     XR CHEST PA AND LATERAL; Future; Expected date: 12/31/2023  -     promethazine-dextromethorphan (PROMETHAZINE-DM) 6.25-15 mg/5 mL Syrp; Take 5 mLs by mouth every 6 (six) hours as needed (cough).  Dispense: 120 mL; Refill: 0    Postnasal drip  -     azelastine (ASTELIN) 137 mcg  (0.1 %) nasal spray; 1 spray (137 mcg total) by Nasal route 2 (two) times daily.  Dispense: 30 mL; Refill: 0  -     fluticasone propionate (FLONASE) 50 mcg/actuation nasal spray; 1 spray (50 mcg total) by Each Nostril route once daily.  Dispense: 15.8 mL; Refill: 0      X-ray is unavailable in clinic today.  We will place order for chest x-ray and I patient received x-ray at another facility.  Will call with results.    - 1517: called pt about Cxr. No acute process seen          Patient Instructions   - Rest.    - Drink plenty of fluids.  - Viral upper respiratory infections typically run their course in 10-14 days.      - You can take over-the-counter claritin, zyrtec, allegra, or xyzal as directed. These are antihistamines that can help with runny nose, nasal congestion, sneezing, and helps to dry up post-nasal drip, which usually causes sore throat and cough.              - If you do NOT have high blood pressure, you may use a decongestant form (D)  of this medication (ie. Claritin- D, zyrtec-D, allegra-D) or if you do not take the D form, you can take sudafed (pseudoephedrine) over the counter, which is a decongestant. Do NOT take two decongestant (D) medications at the same time (such as mucinex-D and claritin-D or plain sudafed and claritin D)    - If you DO have Hypertension, anxiety, or palpitations, it is safe to take Coricidin HBP for relief of sinus symptoms.     - You can use Flonase (fluticasone) nasal spray as directed for sinus congestion and postnasal drip. This is a steroid nasal spray that works locally over time to decrease the inflammation in your nose/sinuses and help with allergic symptoms. This is not an quick- relief spray like afrin, but it works well if used daily.  Discontinue if you develop nose bleed  - use nasal saline prior to Flonase.  - Use Ocean Spray Nasal Saline 1-3 puffs each nostril every 2-3 hours then blow out onto tissue. This is to irrigate the nasal passage way to clear the  sinus openings. Use until sinus problem resolved.     - you can take plain Mucinex (guaifenesin) 1200 mg twice a day to help loosen mucous.      -warm salt water gargles can help with sore throat     - warm tea with honey can help with cough. Honey is a natural cough suppressant.     - Dextromethorphan (DM) is a cough suppressant over the counter (ie. mucinex DM, robitussin, delsym; dayquil/nyquil has DM as well.)        - Follow up with your PCP or specialty clinic as directed in the next 1-2 weeks if not improved or as needed.  You can call [phone] to schedule an appointment with the appropriate provider.       - Go to the ER if you develop new or worsening symptoms.      - You must understand that you have received an Urgent Care treatment only and that you may be released before all of your medical problems are known or treated.   - You, the patient, will arrange for follow up care as instructed.   - If your condition worsens or fails to improve we recommend that you receive another evaluation at the ER immediately or contact your PCP to discuss your concerns or return here.

## 2023-12-31 NOTE — PATIENT INSTRUCTIONS

## 2024-01-02 ENCOUNTER — PATIENT MESSAGE (OUTPATIENT)
Dept: FAMILY MEDICINE | Facility: CLINIC | Age: 69
End: 2024-01-02
Payer: MEDICARE

## 2024-01-04 ENCOUNTER — E-VISIT (OUTPATIENT)
Dept: FAMILY MEDICINE | Facility: CLINIC | Age: 69
End: 2024-01-04
Payer: MEDICARE

## 2024-01-04 DIAGNOSIS — B96.89 ACUTE BACTERIAL BRONCHITIS: Primary | ICD-10-CM

## 2024-01-04 DIAGNOSIS — J20.8 ACUTE BACTERIAL BRONCHITIS: Primary | ICD-10-CM

## 2024-01-04 PROCEDURE — 99421 OL DIG E/M SVC 5-10 MIN: CPT | Mod: ,,, | Performed by: INTERNAL MEDICINE

## 2024-01-04 RX ORDER — PREDNISONE 50 MG/1
50 TABLET ORAL DAILY
Qty: 5 TABLET | Refills: 0 | Status: SHIPPED | OUTPATIENT
Start: 2024-01-04 | End: 2024-01-08 | Stop reason: ALTCHOICE

## 2024-01-04 RX ORDER — DOXYCYCLINE HYCLATE 100 MG
100 TABLET ORAL 2 TIMES DAILY
Qty: 14 TABLET | Refills: 0 | Status: SHIPPED | OUTPATIENT
Start: 2024-01-04 | End: 2024-01-11

## 2024-01-04 NOTE — PROGRESS NOTES
Patient ID: Dariusz Urbina is a 68 y.o. male.    Chief Complaint: URI (Entered automatically based on patient selection in Patient Portal.)    The patient initiated a request through [employer] on 1/4/2024 for evaluation and management with a chief complaint of URI (Entered automatically based on patient selection in Patient Portal.)     I evaluated the questionnaire submission on 1/4/2024.    [facility] Peq Evisit Upper Respitatory/Cough Questionnaire    1/4/2024  1:26 PM CST - Filed by Patient   Do you agree to participate in an E-Visit? Yes   If you have any of the following symptoms, please present to your local ER or call 911:  I acknowledge   What is the main issue that you would like for your doctor to address today? Persistent cough since Thanksgiving   Are you able to take your vital signs? No   What symptoms do you currently have?  Neck pain   Have you ever smoked? I have smoked in the past   Have you had a fever? No   When did your symptoms first appear? 11/23/2023   In the last two weeks, have you been in close contact with someone who has COVID-19 or the Flu? Yes, Flu   In the last two weeks, have you worked or volunteered in a healthcare facility or as a ? Healthcare facilities include a hospital, medical or dental clinic, long-term care facility, or nursing home No   Do you live in a long-term care facility, nursing home, group home, or homeless shelter? No   List what you have done or taken to help your symptoms. Advil, nasal spray, cough syrup   How severe are your symptoms? Moderate   Have your symptoms improved since they first appeared? No change   Have you taken an at home Covid test? Yes   What were the results? Negative   Have you taken a Flu test? No   Have you been fully vaccinated for COVID? (2 Pfizer, 2 Moderna or 1 Shahid & Shahid vaccine injections) Yes   Have you received a booster? Yes   Have you recieved a Flu shot? Yes   When did you recieve your Flu shot? 10/18/2023   Do you  have transportation to get tested for COVID if it is indicated and ordered for you at an [facility] location? Yes   Provide any information you feel is important to your history not asked above I have a deep bronchial cough, feel drained and no improvement in 3 weeks.   Please attach any relevant images or files          Recent Labs Obtained:  No visits with results within 7 Day(s) from this visit.   Latest known visit with results is:   Lab Visit on 12/14/2023   Component Date Value Ref Range Status    Microalbumin, Urine 12/14/2023 74.0  ug/mL Final    Creatinine, Urine 12/14/2023 158.0  23.0 - 375.0 mg/dL Final    Microalb/Creat Ratio 12/14/2023 46.8 (H)  0.0 - 30.0 ug/mg Final    Cholesterol 12/14/2023 225 (H)  120 - 199 mg/dL Final    Comment: The National Cholesterol Education Program (NCEP) has set the  following guidelines (reference ranges) for Cholesterol:  Optimal.....................<200 mg/dL  Borderline High.............200-239 mg/dL  High........................> or = 240 mg/dL      Triglycerides 12/14/2023 205 (H)  30 - 150 mg/dL Final    Comment: The National Cholesterol Education Program (NCEP) has set the  following guidelines (reference values) for triglycerides:  Normal......................<150 mg/dL  Borderline High.............150-199 mg/dL  High........................200-499 mg/dL      HDL 12/14/2023 47  40 - 75 mg/dL Final    Comment: The National Cholesterol Education Program (NCEP) has set the  following guidelines (reference values) for HDL Cholesterol:  Low...............<40 mg/dL  Optimal...........>60 mg/dL      LDL Cholesterol 12/14/2023 137.0  63.0 - 159.0 mg/dL Final    Comment: The National Cholesterol Education Program (NCEP) has set the  following guidelines (reference values) for LDL Cholesterol:  Optimal.......................<130 mg/dL  Borderline High...............130-159 mg/dL  High..........................160-189 mg/dL  Very High.....................>190 mg/dL       HDL/Cholesterol Ratio 12/14/2023 20.9  20.0 - 50.0 % Final    Total Cholesterol/HDL Ratio 12/14/2023 4.8  2.0 - 5.0 Final    Non-HDL Cholesterol 12/14/2023 178  mg/dL Final    Comment: Risk category and Non-HDL cholesterol goals:  Coronary heart disease (CHD)or equivalent (10-year risk of CHD >20%):  Non-HDL cholesterol goal     <130 mg/dL  Two or more CHD risk factors and 10-year risk of CHD <= 20%:  Non-HDL cholesterol goal     <160 mg/dL  0 to 1 CHD risk factor:  Non-HDL cholesterol goal     <190 mg/dL         Encounter Diagnosis   Name Primary?    Acute bacterial bronchitis Yes        No orders of the defined types were placed in this encounter.     Medications Ordered This Encounter   Medications    doxycycline (VIBRA-TABS) 100 MG tablet     Sig: Take 1 tablet (100 mg total) by mouth 2 (two) times daily. for 7 days     Dispense:  14 tablet     Refill:  0    predniSONE (DELTASONE) 50 MG Tab     Sig: Take 1 tablet (50 mg total) by mouth once daily. for 5 days     Dispense:  5 tablet     Refill:  0        No follow-ups on file.      E-Visit Time Tracking:    Day 1 Time (in minutes): 6     Total Time (in minutes): 6

## 2024-01-05 NOTE — TELEPHONE ENCOUNTER
Care Due:                  Date            Visit Type   Department     Provider  --------------------------------------------------------------------------------                                [facility]                              PRIMARY      MED/ INTERNAL  Nils Gross  Last Visit: 08-      CARE ([facility])   MED/ PEDS      Ehrensing  Next Visit: None Scheduled  None         None Found                                                            Last  Test          Frequency    Reason                     Performed    Due Date  --------------------------------------------------------------------------------    CMP.........  12 months..  famotidine, rosuvastatin,   02-   02-                             valsartan................    [employer] Embedded Care Due Messages. Reference number: [id number].   1/05/2024 4:16:47 PM CST

## 2024-01-08 ENCOUNTER — OFFICE VISIT (OUTPATIENT)
Dept: INTERNAL MEDICINE | Facility: CLINIC | Age: 69
End: 2024-01-08
Payer: MEDICARE

## 2024-01-08 VITALS
DIASTOLIC BLOOD PRESSURE: 72 MMHG | WEIGHT: 174.38 LBS | OXYGEN SATURATION: 98 % | HEART RATE: 82 BPM | HEIGHT: 69 IN | BODY MASS INDEX: 25.83 KG/M2 | SYSTOLIC BLOOD PRESSURE: 150 MMHG

## 2024-01-08 DIAGNOSIS — I70.0 ATHEROSCLEROSIS OF AORTA: ICD-10-CM

## 2024-01-08 DIAGNOSIS — Z13.6 SCREENING FOR AAA (AORTIC ABDOMINAL ANEURYSM): ICD-10-CM

## 2024-01-08 DIAGNOSIS — R91.8 LUNG NODULES: ICD-10-CM

## 2024-01-08 DIAGNOSIS — Z00.00 ENCOUNTER FOR PREVENTIVE HEALTH EXAMINATION: Primary | ICD-10-CM

## 2024-01-08 DIAGNOSIS — Z87.891 HISTORY OF SMOKING: ICD-10-CM

## 2024-01-08 DIAGNOSIS — Z00.00 ENCOUNTER FOR MEDICARE ANNUAL WELLNESS EXAM: ICD-10-CM

## 2024-01-08 DIAGNOSIS — E11.9 CONTROLLED TYPE 2 DIABETES MELLITUS WITHOUT COMPLICATION, WITHOUT LONG-TERM CURRENT USE OF INSULIN: ICD-10-CM

## 2024-01-08 DIAGNOSIS — E78.49 OTHER HYPERLIPIDEMIA: ICD-10-CM

## 2024-01-08 PROCEDURE — 1170F FXNL STATUS ASSESSED: CPT | Mod: HCNC,CPTII,S$GLB, | Performed by: NURSE PRACTITIONER

## 2024-01-08 PROCEDURE — 1101F PT FALLS ASSESS-DOCD LE1/YR: CPT | Mod: HCNC,CPTII,S$GLB, | Performed by: NURSE PRACTITIONER

## 2024-01-08 PROCEDURE — 3078F DIAST BP <80 MM HG: CPT | Mod: HCNC,CPTII,S$GLB, | Performed by: NURSE PRACTITIONER

## 2024-01-08 PROCEDURE — 1160F RVW MEDS BY RX/DR IN RCRD: CPT | Mod: HCNC,CPTII,S$GLB, | Performed by: NURSE PRACTITIONER

## 2024-01-08 PROCEDURE — 1159F MED LIST DOCD IN RCRD: CPT | Mod: HCNC,CPTII,S$GLB, | Performed by: NURSE PRACTITIONER

## 2024-01-08 PROCEDURE — 3288F FALL RISK ASSESSMENT DOCD: CPT | Mod: HCNC,CPTII,S$GLB, | Performed by: NURSE PRACTITIONER

## 2024-01-08 PROCEDURE — 1126F AMNT PAIN NOTED NONE PRSNT: CPT | Mod: HCNC,CPTII,S$GLB, | Performed by: NURSE PRACTITIONER

## 2024-01-08 PROCEDURE — 99999 PR PBB SHADOW E&M-EST. PATIENT-LVL V: CPT | Mod: PBBFAC,HCNC,, | Performed by: NURSE PRACTITIONER

## 2024-01-08 PROCEDURE — 3077F SYST BP >= 140 MM HG: CPT | Mod: HCNC,CPTII,S$GLB, | Performed by: NURSE PRACTITIONER

## 2024-01-08 PROCEDURE — G0439 PPPS, SUBSEQ VISIT: HCPCS | Mod: HCNC,S$GLB,, | Performed by: NURSE PRACTITIONER

## 2024-01-08 RX ORDER — MULTIVITAMIN
1 TABLET ORAL DAILY
COMMUNITY

## 2024-01-08 RX ORDER — VALSARTAN 160 MG/1
160 TABLET ORAL DAILY
Qty: 90 TABLET | Refills: 3 | Status: SHIPPED | OUTPATIENT
Start: 2024-01-08 | End: 2024-01-17 | Stop reason: SDUPTHER

## 2024-01-08 RX ORDER — PLANT STANOL ESTER 450 MG
8000 TABLET ORAL DAILY
COMMUNITY

## 2024-01-08 RX ORDER — FAMOTIDINE 20 MG/1
20 TABLET, FILM COATED ORAL 2 TIMES DAILY PRN
Qty: 180 TABLET | Refills: 1 | Status: SHIPPED | OUTPATIENT
Start: 2024-01-08

## 2024-01-08 NOTE — PROGRESS NOTES
"Dariusz Urbina presented for an initial Medicare AWV today. The following components were reviewed and updated:    Medical history  Family History  Social history  Allergies and Current Medications  Health Risk Assessment  Health Maintenance  Care Team    **See Completed Assessments for Annual Wellness visit with in the encounter summary    The following assessments were completed:  Depression Screening  Cognitive function Screening    Timed Get Up Test  Whisper Test - N/A hearing impairment      Opioid documentation:      Patient does not have a current opioid prescription.          Vitals:    01/08/24 0942 01/08/24 1015   BP: (!) 152/80 (!) 150/72   BP Location: Left arm    Pulse: 82    SpO2: 98%    Weight: 79.1 kg (174 lb 6.1 oz)    Height: 5' 9" (1.753 m)      Body mass index is 25.75 kg/m².       Physical Exam  Vitals reviewed.   Constitutional:       Appearance: Normal appearance.   HENT:      Head: Normocephalic.   Cardiovascular:      Rate and Rhythm: Normal rate.   Pulmonary:      Effort: Pulmonary effort is normal.   Abdominal:      General: Bowel sounds are normal.   Musculoskeletal:         General: Normal range of motion.      Right lower leg: No edema.      Left lower leg: No edema.   Skin:     General: Skin is warm and dry.      Capillary Refill: Capillary refill takes less than 2 seconds.   Neurological:      Mental Status: He is alert and oriented to person, place, and time.   Psychiatric:         Mood and Affect: Mood normal.         Behavior: Behavior normal.         Thought Content: Thought content normal.         Judgment: Judgment normal.           Diagnoses and health risks identified today and associated recommendations/orders:  1. Encounter for preventive health examination  Assessments completed.  HM recommendations reviewed. Discussed covid booster. Eye exam as scheduled. AAA screening ordered.  F/u with PCP as scheduled.    2. Controlled type 2 diabetes mellitus without complication, without " long-term current use of insulin  Chronic, stable on current regimen. Followed by PCP.    3. Atherosclerosis of aorta  Chronic, stable on current regimen. Followed by PCP. Not on statin.     4. Other hyperlipidemia  Chronic, stable on current regimen. Followed by PCP.    5. Lung nodules  Chronic, stable on current regimen. Followed by PCP. D/t history of fungal infection of the lung.    6. Screening for AAA (aortic abdominal aneurysm)  AAA screening ordered.  - [place] AAA Screening; Future    7. History of smoking  Chronic, stable on current regimen. Followed by PCP.  - [place] AAA Screening; Future    8. Encounter for Medicare annual wellness exam  Medicare HRA completed.  - Ambulatory Referral/Consult to Enhanced Annual Wellness Visit (eAWV)      Provided Dariusz with a 5-10 year written screening schedule and personal prevention plan. Recommendations were developed using the USPSTF age appropriate recommendations. Education, counseling, and referrals were provided as needed.  After Visit Summary printed and given to patient which includes a list of additional screenings\tests needed.    Follow up in about 1 year (around 1/8/2025) for Medicare AWV and with PCP as instructed.       Demetra Mosquera, NUZHAT    I offered to discuss advanced care planning, including how to pick a person who would make decisions for you if you were unable to make them for yourself, called a health care power of , and what kind of decisions you might make such as use of life sustaining treatments such as ventilators and tube feeding when faced with a life limiting illness recorded on a living will that they will need to know. (How you want to be cared for as you near the end of your natural life)     X  Patient has advanced directives written and agrees to provide copies to the institution.

## 2024-01-08 NOTE — PATIENT INSTRUCTIONS
1. Follow up with Dr. Espino, Nils ANDRADE MD as instructed.    2. New Covid boosters available if interested.    3. Eye exam as scheduled.    4. Schedule AAA screening .    5. Can try electrolyte solution or sports drinks (without sugar) like Body Armor Lyte or Gatorade Lite. Can try a two week course of prilosec or nexium (store brand fine).     Counseling and Referral of Other Preventative  (Italic type indicates deductible and co-insurance are waived)    Patient Name: Dariusz Urbina  Today's Date: 1/8/2024    Health Maintenance         Date Due Completion Date    Eye Exam Never done ---    Abdominal Aortic Aneurysm Screening Never done ---    COVID-19 Vaccine (6 - 2023-24 season) 11/04/2023 9/9/2023    Hemoglobin A1c 02/29/2024 8/29/2023    PROSTATE-SPECIFIC ANTIGEN 08/29/2024 8/29/2023    Diabetes Urine Screening 12/14/2024 12/14/2023    Foot Exam 12/19/2024 12/19/2023 (Done)    Override on 12/19/2023: Done    High Dose Statin 12/31/2024 12/31/2023    TETANUS VACCINE 02/01/2028 2/1/2018    Colorectal Cancer Screening 06/16/2029 6/16/2022          Orders Placed This Encounter   Procedures    [place] AAA Screening       The following information is provided to all patients.  This information is to help you find resources for any of the problems found today that may be affecting your health:                  Living healthy guide: www.[facility].[place].gov      Understanding Diabetes: www.diabetes.org      Eating healthy: www.cdc.gov/healthyweight      CDC home safety checklist: www.cdc.gov/steadi/patient.html      Agency on Aging: www.goea.[place].[facility]      Alcoholics anonymous (AA): www.aa.org      Physical Activity: www.[place].nih.gov/xw9mpez      Tobacco use: www.quitwithusla.org

## 2024-01-16 ENCOUNTER — PATIENT MESSAGE (OUTPATIENT)
Dept: FAMILY MEDICINE | Facility: CLINIC | Age: 69
End: 2024-01-16
Payer: MEDICARE

## 2024-01-17 RX ORDER — VALSARTAN 320 MG/1
320 TABLET ORAL DAILY
Qty: 90 TABLET | Refills: 3 | Status: SHIPPED | OUTPATIENT
Start: 2024-01-17 | End: 2025-01-16

## 2024-02-25 ENCOUNTER — PATIENT MESSAGE (OUTPATIENT)
Dept: ADMINISTRATIVE | Facility: OTHER | Age: 69
End: 2024-02-25
Payer: MEDICARE

## 2024-02-25 ENCOUNTER — PATIENT MESSAGE (OUTPATIENT)
Dept: ADMINISTRATIVE | Facility: HOSPITAL | Age: 69
End: 2024-02-25
Payer: MEDICARE

## 2024-02-25 ENCOUNTER — PATIENT MESSAGE (OUTPATIENT)
Dept: FAMILY MEDICINE | Facility: CLINIC | Age: 69
End: 2024-02-25
Payer: MEDICARE

## 2024-02-26 NOTE — TELEPHONE ENCOUNTER
Please advise,      Hi Doc!   I still have pain in my right butt cheek. When I walk sometimes it feels like a 6 or 7 on a pain scale.  I have tried some stretching but it is not getting better.  It is on the same side as my new hip.   I do not have any pain in my lower back.     What do you suggest?     Doroteo

## 2024-02-28 ENCOUNTER — TELEPHONE (OUTPATIENT)
Dept: FAMILY MEDICINE | Facility: CLINIC | Age: 69
End: 2024-02-28
Payer: MEDICARE

## 2024-02-28 DIAGNOSIS — M54.31 RIGHT SIDED SCIATICA: Primary | ICD-10-CM

## 2024-02-28 NOTE — TELEPHONE ENCOUNTER
----- Message from Dontae Sheldon sent at 2/28/2024  4:44 PM CST -----  Regarding: self  Type: Patient Call Back    Who called:self    What is the request in detail:pt is requesting a referral is to be sent to this fax [phone] physical therapy     Can the clinic reply by [facility]?no    Would the patient rather a call back or a response via [facility]? callback    Best call back number:[phone]    Additional Information:

## 2024-02-28 NOTE — TELEPHONE ENCOUNTER
Spoke with patient and he said that his insurance would pay for Dr King and would like referral fax to number listed below.

## 2024-02-28 NOTE — TELEPHONE ENCOUNTER
----- Message from Dontae Sheldon sent at 2/28/2024  4:44 PM CST -----  Regarding: self  Type: Patient Call Back    Who called:self    What is the request in detail:pt is requesting a referral is to be sent to this fax [phone] physical therapy     Can the clinic reply by [facility]?no    Would the patient rather a call back or a response via [facility]? callback    Best call back number:[phone]    Additional Information:

## 2024-03-01 ENCOUNTER — PATIENT MESSAGE (OUTPATIENT)
Dept: ADMINISTRATIVE | Facility: HOSPITAL | Age: 69
End: 2024-03-01
Payer: MEDICARE

## 2024-04-01 ENCOUNTER — PATIENT MESSAGE (OUTPATIENT)
Dept: FAMILY MEDICINE | Facility: CLINIC | Age: 69
End: 2024-04-01
Payer: MEDICARE

## 2024-04-01 DIAGNOSIS — E11.9 CONTROLLED TYPE 2 DIABETES MELLITUS WITHOUT COMPLICATION, WITHOUT LONG-TERM CURRENT USE OF INSULIN: Primary | ICD-10-CM

## 2024-04-01 NOTE — TELEPHONE ENCOUNTER
Please advise,    Hi Doc!  Hope you had an enjoyable weekend!     My sugar seems to remain high.  I have tried to limit my sugar intake (somewhat).  What are your suggestions?   Can you see the readings that I have taken on the glucometer?

## 2024-04-03 ENCOUNTER — LAB VISIT (OUTPATIENT)
Dept: LAB | Facility: HOSPITAL | Age: 69
End: 2024-04-03
Attending: INTERNAL MEDICINE
Payer: MEDICARE

## 2024-04-03 ENCOUNTER — PATIENT MESSAGE (OUTPATIENT)
Dept: FAMILY MEDICINE | Facility: CLINIC | Age: 69
End: 2024-04-03
Payer: MEDICARE

## 2024-04-03 DIAGNOSIS — E11.9 CONTROLLED TYPE 2 DIABETES MELLITUS WITHOUT COMPLICATION, WITHOUT LONG-TERM CURRENT USE OF INSULIN: ICD-10-CM

## 2024-04-03 LAB
ALBUMIN SERPL BCP-MCNC: 4 G/DL (ref 3.5–5.2)
ALP SERPL-CCNC: 52 U/L (ref 55–135)
ALT SERPL W/O P-5'-P-CCNC: 19 U/L (ref 10–44)
ANION GAP SERPL CALC-SCNC: 10 MMOL/L (ref 8–16)
AST SERPL-CCNC: 20 U/L (ref 10–40)
BASOPHILS # BLD AUTO: 0.05 K/UL (ref 0–0.2)
BASOPHILS NFR BLD: 0.8 % (ref 0–1.9)
BILIRUB SERPL-MCNC: 0.8 MG/DL (ref 0.1–1)
BUN SERPL-MCNC: 15 MG/DL (ref 8–23)
CALCIUM SERPL-MCNC: 10 MG/DL (ref 8.7–10.5)
CHLORIDE SERPL-SCNC: 104 MMOL/L (ref 95–110)
CHOLEST SERPL-MCNC: 168 MG/DL (ref 120–199)
CHOLEST/HDLC SERPL: 3.9 {RATIO} (ref 2–5)
CO2 SERPL-SCNC: 23 MMOL/L (ref 23–29)
CREAT SERPL-MCNC: 0.9 MG/DL (ref 0.5–1.4)
DIFFERENTIAL METHOD BLD: ABNORMAL
EOSINOPHIL # BLD AUTO: 0.1 K/UL (ref 0–0.5)
EOSINOPHIL NFR BLD: 1.5 % (ref 0–8)
ERYTHROCYTE [DISTWIDTH] IN BLOOD BY AUTOMATED COUNT: 13.6 % (ref 11.5–14.5)
EST. GFR  (NO RACE VARIABLE): >60 ML/MIN/1.73 M^2
ESTIMATED AVG GLUCOSE: 160 MG/DL (ref 68–131)
GLUCOSE SERPL-MCNC: 140 MG/DL (ref 70–110)
HBA1C MFR BLD: 7.2 % (ref 4–5.6)
HCT VFR BLD AUTO: 41.6 % (ref 40–54)
HDLC SERPL-MCNC: 43 MG/DL (ref 40–75)
HDLC SERPL: 25.6 % (ref 20–50)
HGB BLD-MCNC: 13.6 G/DL (ref 14–18)
IMM GRANULOCYTES # BLD AUTO: 0.02 K/UL (ref 0–0.04)
IMM GRANULOCYTES NFR BLD AUTO: 0.3 % (ref 0–0.5)
LDLC SERPL CALC-MCNC: 90.8 MG/DL (ref 63–159)
LYMPHOCYTES # BLD AUTO: 1.5 K/UL (ref 1–4.8)
LYMPHOCYTES NFR BLD: 22.3 % (ref 18–48)
MCH RBC QN AUTO: 30 PG (ref 27–31)
MCHC RBC AUTO-ENTMCNC: 32.7 G/DL (ref 32–36)
MCV RBC AUTO: 92 FL (ref 82–98)
MONOCYTES # BLD AUTO: 0.7 K/UL (ref 0.3–1)
MONOCYTES NFR BLD: 11 % (ref 4–15)
NEUTROPHILS # BLD AUTO: 4.2 K/UL (ref 1.8–7.7)
NEUTROPHILS NFR BLD: 64.1 % (ref 38–73)
NONHDLC SERPL-MCNC: 125 MG/DL
NRBC BLD-RTO: 0 /100 WBC
PLATELET # BLD AUTO: 280 K/UL (ref 150–450)
PMV BLD AUTO: 10.1 FL (ref 9.2–12.9)
POTASSIUM SERPL-SCNC: 4.5 MMOL/L (ref 3.5–5.1)
PROT SERPL-MCNC: 7.2 G/DL (ref 6–8.4)
RBC # BLD AUTO: 4.54 M/UL (ref 4.6–6.2)
SODIUM SERPL-SCNC: 137 MMOL/L (ref 136–145)
TRIGL SERPL-MCNC: 171 MG/DL (ref 30–150)
TSH SERPL DL<=0.005 MIU/L-ACNC: 2.27 UIU/ML (ref 0.4–4)
WBC # BLD AUTO: 6.55 K/UL (ref 3.9–12.7)

## 2024-04-03 PROCEDURE — 36415 COLL VENOUS BLD VENIPUNCTURE: CPT | Mod: HCNC,PO | Performed by: INTERNAL MEDICINE

## 2024-04-03 PROCEDURE — 83036 HEMOGLOBIN GLYCOSYLATED A1C: CPT | Mod: HCNC | Performed by: INTERNAL MEDICINE

## 2024-04-03 PROCEDURE — 85025 COMPLETE CBC W/AUTO DIFF WBC: CPT | Mod: HCNC | Performed by: INTERNAL MEDICINE

## 2024-04-03 PROCEDURE — 84443 ASSAY THYROID STIM HORMONE: CPT | Mod: HCNC | Performed by: INTERNAL MEDICINE

## 2024-04-03 PROCEDURE — 80061 LIPID PANEL: CPT | Mod: HCNC | Performed by: INTERNAL MEDICINE

## 2024-04-03 PROCEDURE — 80053 COMPREHEN METABOLIC PANEL: CPT | Mod: HCNC | Performed by: INTERNAL MEDICINE

## 2024-04-03 NOTE — TELEPHONE ENCOUNTER
Please contact the digital medicine rep to check and see where glucose readings are in this chart, not obvious

## 2024-04-03 NOTE — TELEPHONE ENCOUNTER
Please advise,    Hi Doc!     I am providing blood samples now downstairs.   I did check with the [facility] on the 1st floor about my glucose metering records.  He told me that the results of my previous testing are available to your office.      Have a great day!     Doroteo

## 2024-04-05 ENCOUNTER — OFFICE VISIT (OUTPATIENT)
Dept: OPTOMETRY | Facility: CLINIC | Age: 69
End: 2024-04-05
Payer: MEDICARE

## 2024-04-05 DIAGNOSIS — E11.36 TYPE 2 DIABETES MELLITUS WITH DIABETIC CATARACT, WITHOUT LONG-TERM CURRENT USE OF INSULIN: Primary | ICD-10-CM

## 2024-04-05 DIAGNOSIS — H52.7 REFRACTIVE ERROR: ICD-10-CM

## 2024-04-05 DIAGNOSIS — H25.13 NUCLEAR SCLEROSIS OF BOTH EYES: ICD-10-CM

## 2024-04-05 PROCEDURE — 1101F PT FALLS ASSESS-DOCD LE1/YR: CPT | Mod: HCNC,CPTII,S$GLB, | Performed by: OPTOMETRIST

## 2024-04-05 PROCEDURE — 3288F FALL RISK ASSESSMENT DOCD: CPT | Mod: HCNC,CPTII,S$GLB, | Performed by: OPTOMETRIST

## 2024-04-05 PROCEDURE — 1159F MED LIST DOCD IN RCRD: CPT | Mod: HCNC,CPTII,S$GLB, | Performed by: OPTOMETRIST

## 2024-04-05 PROCEDURE — 1160F RVW MEDS BY RX/DR IN RCRD: CPT | Mod: HCNC,CPTII,S$GLB, | Performed by: OPTOMETRIST

## 2024-04-05 PROCEDURE — 92004 COMPRE OPH EXAM NEW PT 1/>: CPT | Mod: HCNC,S$GLB,, | Performed by: OPTOMETRIST

## 2024-04-05 PROCEDURE — 92015 DETERMINE REFRACTIVE STATE: CPT | Mod: HCNC,S$GLB,, | Performed by: OPTOMETRIST

## 2024-04-05 PROCEDURE — 3051F HG A1C>EQUAL 7.0%<8.0%: CPT | Mod: HCNC,CPTII,S$GLB, | Performed by: OPTOMETRIST

## 2024-04-05 PROCEDURE — 1126F AMNT PAIN NOTED NONE PRSNT: CPT | Mod: HCNC,CPTII,S$GLB, | Performed by: OPTOMETRIST

## 2024-04-05 PROCEDURE — 99999 PR PBB SHADOW E&M-EST. PATIENT-LVL III: CPT | Mod: PBBFAC,HCNC,, | Performed by: OPTOMETRIST

## 2024-04-05 PROCEDURE — 2023F DILAT RTA XM W/O RTNOPTHY: CPT | Mod: HCNC,CPTII,S$GLB, | Performed by: OPTOMETRIST

## 2024-04-05 PROCEDURE — 4010F ACE/ARB THERAPY RXD/TAKEN: CPT | Mod: HCNC,CPTII,S$GLB, | Performed by: OPTOMETRIST

## 2024-04-05 NOTE — PROGRESS NOTES
Subjective:       Patient ID: Dariusz Urbina is a 68 y.o. male      Chief Complaint   Patient presents with    Concerns About Ocular Health    Diabetic Eye Exam     History of Present Illness  Dls: 1 yr     67 y/o male presents today for diabetic eye exam.   Pt states no changes in vision. Pt wears pal's.      today     No tearing  No itching  No burning  No pain  No ha's  No floaters  No flashes    Eye meds  None    Pohx:   None    Fohx:   None    Hemoglobin A1C       Date                     Value               Ref Range             Status                04/03/2024               7.2 (H)             4.0 - 5.6 %           Final                  08/29/2023               6.3 (H)             4.0 - 5.6 %           Final                   02/08/2023               6.5 (H)             4.0 - 5.6 %           Final                 Assessment/Plan:     1. Type 2 diabetes mellitus with diabetic cataract, without long-term current use of insulin  No diabetic retinopathy. Discussed with pt the effects of diabetes on vision, importance of good blood sugar control, compliance with meds, and follow up care with PCP. Return in 1 year for dilated eye exam, sooner PRN.      2. Nuclear sclerosis of both eyes  Educated pt on presence of cataracts and effects on vision. No surgery at this time. Recheck in one year, sooner PRN.    3. Refractive error  Educated patient on refractive error and discussed lens options. Dispensed updated spectacle Rx. Educated about adaptation period to new specs.    Eyeglass Final Rx       Eyeglass Final Rx         Sphere Cylinder Axis Add    Right +0.75 +0.50 040 +2.50    Left +1.00 +0.50 150 +2.50      Expiration Date: 4/5/2025                      Follow up in about 1 year (around 4/5/2025) for Diabetic Eye Exam.

## 2024-04-09 NOTE — TELEPHONE ENCOUNTER
Please advise,    Hi Doc!     Did the results of my gluecomete show up on my chart?  Can I start taking a pill to help lower my sugar?     Doroteo Sharif

## 2024-04-16 ENCOUNTER — PATIENT MESSAGE (OUTPATIENT)
Dept: OTHER | Facility: OTHER | Age: 69
End: 2024-04-16
Payer: MEDICARE

## 2024-06-03 ENCOUNTER — OFFICE VISIT (OUTPATIENT)
Dept: FAMILY MEDICINE | Facility: CLINIC | Age: 69
End: 2024-06-03
Payer: MEDICARE

## 2024-06-03 VITALS
OXYGEN SATURATION: 98 % | WEIGHT: 170.19 LBS | DIASTOLIC BLOOD PRESSURE: 68 MMHG | HEIGHT: 69 IN | TEMPERATURE: 98 F | SYSTOLIC BLOOD PRESSURE: 120 MMHG | HEART RATE: 77 BPM | BODY MASS INDEX: 25.21 KG/M2

## 2024-06-03 DIAGNOSIS — Z86.010 HISTORY OF COLONIC POLYPS: ICD-10-CM

## 2024-06-03 DIAGNOSIS — E78.5 HYPERLIPIDEMIA, UNSPECIFIED HYPERLIPIDEMIA TYPE: ICD-10-CM

## 2024-06-03 DIAGNOSIS — E11.65 UNCONTROLLED TYPE 2 DIABETES MELLITUS WITH HYPERGLYCEMIA: Primary | ICD-10-CM

## 2024-06-03 DIAGNOSIS — I10 PRIMARY HYPERTENSION: ICD-10-CM

## 2024-06-03 DIAGNOSIS — D64.9 ANEMIA, UNSPECIFIED TYPE: ICD-10-CM

## 2024-06-03 PROCEDURE — 1101F PT FALLS ASSESS-DOCD LE1/YR: CPT | Mod: HCNC,CPTII,S$GLB, | Performed by: INTERNAL MEDICINE

## 2024-06-03 PROCEDURE — 1125F AMNT PAIN NOTED PAIN PRSNT: CPT | Mod: HCNC,CPTII,S$GLB, | Performed by: INTERNAL MEDICINE

## 2024-06-03 PROCEDURE — 1159F MED LIST DOCD IN RCRD: CPT | Mod: HCNC,CPTII,S$GLB, | Performed by: INTERNAL MEDICINE

## 2024-06-03 PROCEDURE — 4010F ACE/ARB THERAPY RXD/TAKEN: CPT | Mod: HCNC,CPTII,S$GLB, | Performed by: INTERNAL MEDICINE

## 2024-06-03 PROCEDURE — 3078F DIAST BP <80 MM HG: CPT | Mod: HCNC,CPTII,S$GLB, | Performed by: INTERNAL MEDICINE

## 2024-06-03 PROCEDURE — 3074F SYST BP LT 130 MM HG: CPT | Mod: HCNC,CPTII,S$GLB, | Performed by: INTERNAL MEDICINE

## 2024-06-03 PROCEDURE — 3051F HG A1C>EQUAL 7.0%<8.0%: CPT | Mod: HCNC,CPTII,S$GLB, | Performed by: INTERNAL MEDICINE

## 2024-06-03 PROCEDURE — 3288F FALL RISK ASSESSMENT DOCD: CPT | Mod: HCNC,CPTII,S$GLB, | Performed by: INTERNAL MEDICINE

## 2024-06-03 PROCEDURE — 99999 PR PBB SHADOW E&M-EST. PATIENT-LVL III: CPT | Mod: PBBFAC,HCNC,, | Performed by: INTERNAL MEDICINE

## 2024-06-03 PROCEDURE — 3008F BODY MASS INDEX DOCD: CPT | Mod: HCNC,CPTII,S$GLB, | Performed by: INTERNAL MEDICINE

## 2024-06-03 PROCEDURE — 99214 OFFICE O/P EST MOD 30 MIN: CPT | Mod: HCNC,S$GLB,, | Performed by: INTERNAL MEDICINE

## 2024-06-03 RX ORDER — METFORMIN HYDROCHLORIDE 750 MG/1
TABLET, EXTENDED RELEASE ORAL
Qty: 180 TABLET | Refills: 1 | Status: SHIPPED | OUTPATIENT
Start: 2024-06-03

## 2024-06-03 NOTE — PROGRESS NOTES
Chief complaint f/u on labs and DM     Physical  -8/23    Patient is a 68  -year-old white male .  2 normal colonoscopies , 2 POLYPS 6/22 -5 yrs ..  PSA 8/23.       Patient's last A1c was 6.5 back in February, 6.3, 7.2 .  Discussed overall starch and sugar reduction.  We also discussed he has not on any medication it is probably time to initiate some medication to help.  We discussed metformin and potential side effects and to follow-up in three months with an A1c.     Lipids are excellent apparently on crestor      Apparently no further follow-up for his lung mass issue.  Reviewed interval pulmonary note.  Reviewed prior chest x-rays and CT scans.  His initial chest x-ray is not available as it was at [facility].  He was ultimately treated for both bacterial and fungal causes and symptoms all resolved.  No recurrence of any fevers chills night sweats a respiratory symptoms or cough.  Reviewed prior chest x-rays and CT scans and Infectious Disease notes.  We looked up the bacteria causing use issue on up-to-date although no natural history seen to explain how he may have acquired.    Slight hemoglobin reduction appears stable      ..  He is up-to-date on his colon screening.            Also monitoring blood pressure at home and in the past was not perfect so we did increase his valsartan to 160 with good response.  Continue monitoring, patient says it runs a little bit higher at home but today's reading at the office is great.        ROS:   CONST: weight stable. EYES: no vision change. ENT: no sore throat. CV: no chest pain w/ exertion. RESP: no shortness of breath. GI: no nausea, vomiting, diarrhea. No dysphagia. : no urinary issues. MUSCULOSKELETAL: no new myalgias or arthralgias. SKIN: no new changes. NEURO: no focal deficits. PSYCH: no new issues. ENDOCRINE: no polyuria. HEME: no lymph nodes. ALLERGY: no general pruritis.     PAST MEDICAL HISTORY:                                                        1.   Hyperlipidemia.                                                          2.  Back surgery for slipped disk, Dr. Sams.                            3.  History of ED, seen prior by Urology.                                    4.  History of suspicious stress test and atypical chest pain, normal cath  by Dr. Lugo, likely done at [facility].                            5.  Vasectomy.                                                               6.  Normal colonoscopy,  And , 2 POLYPS  -5 yrs                                              7.  Erosive gastropathy, outside EGD along with a normal emptying study.     8.  Slight anemia in , but did donate blood prior.  9.  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 right hip replacement- Dr Thomas  12.   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                                                                                                    FAMILY HISTORY:  Father  of lung cancer and coronary disease at 73.      Sister  of lung cancer, mom  of breast cancer and she had diabetes.   A brother is apparently okay.                                                                                               Gen: no distress  Exam otherwise deferred      Diagnoses and all orders for this visit:    Uncontrolled type 2 diabetes mellitus with hyperglycemia, A1c higher than all prior, always improved diet, initiate metformin, follow-up in three months    Hyperlipidemia, unspecified hyperlipidemia type, excellent control on Crestor    Primary hypertension, chronic and stable    Anemia, unspecified type, slight hemoglobin reduction stable    History of colonic polyps,  up-to-date    Other orders  -     metFORMIN (GLUCOPHAGE-XR) 750 MG ER 24hr tablet; 2 pills in the AM

## 2024-07-07 ENCOUNTER — PATIENT MESSAGE (OUTPATIENT)
Dept: OTHER | Facility: OTHER | Age: 69
End: 2024-07-07
Payer: MEDICARE

## 2024-07-19 ENCOUNTER — HOSPITAL ENCOUNTER (OUTPATIENT)
Dept: RADIOLOGY | Facility: HOSPITAL | Age: 69
Discharge: HOME | End: 2024-07-19
Payer: MEDICARE

## 2024-07-19 ENCOUNTER — OFFICE VISIT (OUTPATIENT)
Dept: FAMILY MEDICINE | Facility: CLINIC | Age: 69
End: 2024-07-19
Payer: MEDICARE

## 2024-07-19 VITALS
OXYGEN SATURATION: 95 % | HEIGHT: 69 IN | BODY MASS INDEX: 24.61 KG/M2 | DIASTOLIC BLOOD PRESSURE: 68 MMHG | HEART RATE: 88 BPM | WEIGHT: 166.13 LBS | TEMPERATURE: 98 F | SYSTOLIC BLOOD PRESSURE: 150 MMHG

## 2024-07-19 DIAGNOSIS — B96.89 ACUTE BACTERIAL BRONCHITIS: ICD-10-CM

## 2024-07-19 DIAGNOSIS — J20.8 ACUTE BACTERIAL BRONCHITIS: ICD-10-CM

## 2024-07-19 DIAGNOSIS — R91.8 LUNG MASS: Primary | ICD-10-CM

## 2024-07-19 PROCEDURE — 71046 X-RAY EXAM CHEST 2 VIEWS: CPT | Mod: 26,,, | Performed by: RADIOLOGY

## 2024-07-19 PROCEDURE — 99214 OFFICE O/P EST MOD 30 MIN: CPT | Mod: S$GLB,,,

## 2024-07-19 PROCEDURE — 1101F PT FALLS ASSESS-DOCD LE1/YR: CPT | Mod: CPTII,S$GLB,,

## 2024-07-19 PROCEDURE — 1160F RVW MEDS BY RX/DR IN RCRD: CPT | Mod: CPTII,S$GLB,,

## 2024-07-19 PROCEDURE — 3288F FALL RISK ASSESSMENT DOCD: CPT | Mod: CPTII,S$GLB,,

## 2024-07-19 PROCEDURE — 71046 X-RAY EXAM CHEST 2 VIEWS: CPT | Mod: TC,HCNC,FY,PO

## 2024-07-19 PROCEDURE — 4010F ACE/ARB THERAPY RXD/TAKEN: CPT | Mod: CPTII,S$GLB,,

## 2024-07-19 PROCEDURE — 3077F SYST BP >= 140 MM HG: CPT | Mod: CPTII,S$GLB,,

## 2024-07-19 PROCEDURE — 99999 PR PBB SHADOW E&M-EST. PATIENT-LVL V: CPT | Mod: PBBFAC,HCNC,,

## 2024-07-19 PROCEDURE — 1126F AMNT PAIN NOTED NONE PRSNT: CPT | Mod: CPTII,S$GLB,,

## 2024-07-19 PROCEDURE — 3078F DIAST BP <80 MM HG: CPT | Mod: CPTII,S$GLB,,

## 2024-07-19 PROCEDURE — 1159F MED LIST DOCD IN RCRD: CPT | Mod: CPTII,S$GLB,,

## 2024-07-19 PROCEDURE — 3008F BODY MASS INDEX DOCD: CPT | Mod: CPTII,S$GLB,,

## 2024-07-19 PROCEDURE — 3051F HG A1C>EQUAL 7.0%<8.0%: CPT | Mod: CPTII,S$GLB,,

## 2024-07-19 RX ORDER — DOXYCYCLINE 100 MG/1
100 CAPSULE ORAL EVERY 12 HOURS
Qty: 14 CAPSULE | Refills: 0 | Status: SHIPPED | OUTPATIENT
Start: 2024-07-19 | End: 2024-07-22 | Stop reason: SDUPTHER

## 2024-07-19 NOTE — PROGRESS NOTES
"  HPI     Chief Complaint:  Chief Complaint   Patient presents with    Cold Exposure       Dariusz Urbina is a 69 y.o. male with multiple medical diagnoses as listed in the medical history and problem list that presents for   Chief Complaint   Patient presents with    Cold Exposure   .   Patient is not known to me with his last appointment in this department on 6/3/2024.      HPI    Cough with congestion and PND started approx 1 week ago. Pt admits notices wheeze at night sometimes. Cough nonproductive. No ear pain, sore throat or sinus congestion. PMH of lung mass/nodules that resolved after tx with abx. Smoking history. Denies fever, chest pain or SOB. Given history will complete chest xray and tx with doxy x 7 days.   HTN - took medication x 3 hours prior to appt. TP states has been followed BP at home, PCP aware. Consider adding medication to current regimen if still elevated in 2 weeks. DASH diet. Pt admits hasn't been working out lately.   DM - monitoring closely digital medicine program.     Assessment & Plan       Problem List Items Addressed This Visit          Pulmonary    Lung mass - Primary    Overview     Presented on Chika 10, 2017 with 2 weeks of fatigue, productive cough, shortness of breath on exertion, and fever.  X-ray on Chika 10, 2017 showed patchy airspace disease with multiple rounded opacities.  CT on June 12 showed numerous opacities throughout the lungs bilaterally.  The largest measured 4 cm in the right lower lobe with one in the right upper lobe measuring 3.1 cm and one in the right middle lobe measuring 3.4 cm.  There was an area of consolidation measuring 9 cm in the left lower lobe.      CT-guided biopsy of the lung was performed on June 16. No cultures were done.   Biopsy from FNA on June 16 showed fibrosis with granuloma and focal necrosis.  No malignancy was seen. Special stains were positive for fungal yeast forms. PCR for fungal RNA was negative, however, the report states "formalin " "dramatically reduces the sensitivity of the assays due to reduced template yield in quality."     PET scan on June 19 showed "innumerable hypermetabolic masses."    Cryptococcal antigen on June 21 was negative.  Repeat on July 11 also negative.      On July 19 he had bronchoscopy with LLL BAL and right upper lobe wedge resection via VATS.     BAL from the left lower lobe showed no malignant cells and a few inflammatory cells.    Pathology from the right upper lobe wedge resection showed formed, noncaseating, giant cell granulomata.  No fungal elements on GMS stain and no filamentous organisms on PASD stain seen.  No neoplasm or AFB were detected.    Cultures on July 19 from the left lower lobe were negative for aerobes but positive for Actinomyces odontolyticus in the anaerobic culture.  No AFB or fungus was seen or grown.  The anaerobic culture in the right upper lobe grew Finegoldia magna, and cultures for aerobes, AFB, fungi were negative.    Treatment included fluconazole started in late June at 800 mg daily and IV antibiotics starting on 8/8. PICC line was placed on August 8. He is on this therapy until 9/20.          Other Visit Diagnoses       Acute bacterial bronchitis      Chest xray  Symptom mgmt    Doxy x 7 days  I counseled the patient on fluids, rest, OTC medications that can safely be used, hand/cough hygiene, expected course of illness, and when further medical attention would be warranted.  Etiology most likely viral. Encouraged rest, fluids, cough suppressants, lozenges, Chloraseptic spray for sore throat, and nasal saline/Netti pot followed by Flonase, antihistamines prn.      Relevant Medications    doxycycline (VIBRAMYCIN) 100 MG Cap    Other Relevant Orders    X-Ray Chest PA And Lateral            --------------------------------------------      Health Maintenance:  Health Maintenance         Date Due Completion Date    Abdominal Aortic Aneurysm Screening Never done ---    COVID-19 Vaccine (6 - " "2023-24 season) 11/04/2023 9/9/2023    PROSTATE-SPECIFIC ANTIGEN 08/29/2024 8/29/2023    Influenza Vaccine (1) 09/01/2024 9/11/2023    Hemoglobin A1c 10/03/2024 4/3/2024    Diabetes Urine Screening 12/14/2024 12/14/2023    Foot Exam 12/19/2024 12/19/2023 (Done)    Override on 12/19/2023: Done    Eye Exam 04/05/2025 4/5/2024    High Dose Statin 06/03/2025 6/3/2024    TETANUS VACCINE 02/01/2028 2/1/2018    Colorectal Cancer Screening 06/16/2029 6/16/2022            Health maintenance reviewed    Follow Up:  No follow-ups on file.    Exam     Review of Systems:  (as noted above)  Review of Systems    Physical Exam:   Physical Exam  Constitutional:       General: He is not in acute distress.     Appearance: Normal appearance. He is normal weight. He is not toxic-appearing.   HENT:      Head: Normocephalic and atraumatic.   Cardiovascular:      Rate and Rhythm: Normal rate and regular rhythm.      Pulses: Normal pulses.      Heart sounds: No murmur heard.  Pulmonary:      Effort: Pulmonary effort is normal. No respiratory distress.      Breath sounds: No decreased air movement. Examination of the right-upper field reveals wheezing. Wheezing present. No decreased breath sounds.   Musculoskeletal:      Cervical back: Normal range of motion and neck supple.   Skin:     General: Skin is warm and dry.      Capillary Refill: Capillary refill takes less than 2 seconds.   Neurological:      General: No focal deficit present.      Mental Status: He is alert and oriented to person, place, and time.   Psychiatric:         Mood and Affect: Mood normal.       Vitals:    07/19/24 1128   BP: (!) 150/68   Pulse: 88   Temp: 98.3 °F (36.8 °C)   TempSrc: Oral   SpO2: 95%   Weight: 75.4 kg (166 lb 1.9 oz)   Height: 5' 9" (1.753 m)      Body mass index is 24.53 kg/m².        History     Past Medical History:  Past Medical History:   Diagnosis Date    Controlled type 2 diabetes mellitus without complication, without long-term current use of " insulin 2023    Decreased libido 2013    Dermatitis 10/12/2012    Hyperlipidemia 2013    Hypertension        Past Surgical History:  Past Surgical History:   Procedure Laterality Date    COLONOSCOPY      COLONOSCOPY N/A 2022    Procedure: COLONOSCOPY;  Surgeon: Omaira Mcclelland MD;  Location: [facility] ENDO;  Service: Endoscopy;  Laterality: N/A;  fully vaccinated  instructions emailed   [facility] provider from Dr. Alvarez to Dr. Mcclelland---    HIP REPLACEMENT ARTHROPLASTY  2019    LUMBAR FUSION      titanium rods    TONSILLECTOMY      TRANSFORAMINAL EPIDURAL INJECTION OF STEROID Left 2022    Procedure: TFESI Left  L3-4;  Surgeon: Mario Lamb DO;  Location: [facility] OR;  Service: Pain Management;  Laterality: Left;    TRANSFORAMINAL EPIDURAL INJECTION OF STEROID Left 2022    Procedure: INJECTION, STEROID, EPIDURAL, TRANSFORAMINAL APPROACH, LEFT L3-L4 CONTRAST DIRECT REF;  Surgeon: Jaspreet Amos MD;  Location: [facility] PAIN MGT;  Service: Pain Management;  Laterality: Left;       Social History:  Social History     Socioeconomic History    Marital status:    Tobacco Use    Smoking status: Former     Current packs/day: 0.00     Average packs/day: 1 pack/day for 10.0 years (10.0 ttl pk-yrs)     Types: Cigarettes     Start date: 1979     Quit date: 1989     Years since quittin.0    Smokeless tobacco: Never    Tobacco comments:     Patient Quit Smoking on 1989.   Substance and Sexual Activity    Alcohol use: No     Comment: RARELY    Drug use: Not Currently     Social Determinants of Health     Financial Resource Strain: Low Risk  (2024)    Overall Financial Resource Strain (CARDIA)     Difficulty of Paying Living Expenses: Not hard at all   Food Insecurity: No Food Insecurity (2024)    Hunger Vital Sign     Worried About Running Out of Food in the Last Year: Never true     Ran Out of Food in the Last Year: Never true   Transportation Needs: No  Transportation Needs (4/2/2024)    PRAPARE - Transportation     Lack of Transportation (Medical): No     Lack of Transportation (Non-Medical): No   Physical Activity: Sufficiently Active (4/2/2024)    Exercise Vital Sign     Days of Exercise per Week: 4 days     Minutes of Exercise per Session: 50 min   Recent Concern: Physical Activity - Insufficiently Active (1/8/2024)    Exercise Vital Sign     Days of Exercise per Week: 4 days     Minutes of Exercise per Session: 30 min   Stress: No Stress Concern Present (4/2/2024)    [employer] [place] [employer] - Occupational Stress Questionnaire     Feeling of Stress : Only a little   Housing Stability: Low Risk  (4/2/2024)    Housing Stability Vital Sign     Unable to Pay for Housing in the Last Year: No     Number of Places Lived in the Last Year: 1     Unstable Housing in the Last Year: No       Family History:  No family history on file.    Allergies and Medications: (updated and reviewed)  Review of patient's allergies indicates:  No Known Allergies  Current Outpatient Medications   Medication Sig Dispense Refill    ascorbic acid, vitamin C, (VITAMIN C) 1000 MG tablet Take 1,000 mg by mouth.      cyanocobalamin (VITAMIN B-12) 1000 MCG tablet Take 100 mcg by mouth.      famotidine (PEPCID) 20 MG tablet Take 1 tablet (20 mg total) by mouth 2 (two) times daily as needed for Heartburn. 180 tablet 1    ferrous gluconate (FERGON) 324 MG tablet Take 324 mg by mouth daily with breakfast.      ibuprofen (ADVIL,MOTRIN) 200 MG tablet Take 200 mg by mouth every 6 (six) hours as needed for Pain.      metFORMIN (GLUCOPHAGE-XR) 750 MG ER 24hr tablet 2 pills in the  tablet 1    multivitamin (ONE DAILY MULTIVITAMIN) per tablet Take 1 tablet by mouth once daily.      rosuvastatin (CRESTOR) 20 MG tablet Take 1 tablet (20 mg total) by mouth once daily. 30 tablet 0    valsartan (DIOVAN) 320 MG tablet Take 1 tablet (320 mg total) by mouth once daily. 90 tablet 3    vitamin  A 8000 UNIT capsule Take 8,000 Units by mouth once daily.      vitamin D (VITAMIN D3) 1000 units Tab Take 1,000 Units by mouth.      doxycycline (VIBRAMYCIN) 100 MG Cap Take 1 capsule (100 mg total) by mouth every 12 (twelve) hours. for 7 days 14 capsule 0     No current facility-administered medications for this visit.     Facility-Administered Medications Ordered in Other Visits   Medication Dose Route Frequency Provider Last Rate Last Admin    0.9%  NaCl infusion  500 mL Intravenous Continuous Gee Jj MD           Patient Care Team:  Nils Espino MD as PCP - General (Internal Medicine)  Frances Thomas MA as Care Coordinator  Rocio Cesar as Digital Medicine Health   Nils Espino MD as Hyperlipidemia Digital Medicine Responsible Provider (Internal Medicine)  Sania Duenas as Hyperlipidemia Digital Medicine Clinician  [employer] as Diabetes Digital Medicine Contract  Nils Espino MD as Diabetes Digital Medicine Responsible Provider (Internal Medicine)  Sania Duenas as Diabetes Digital Medicine Clinician         - The patient is given an After Visit Summary that lists all medications with directions, allergies, education, orders placed during this encounter and follow-up instructions.      - I have reviewed the patient's medical information including past medical, family, and social history sections including the medications and allergies.      - We discussed the patient's current medications.     This note was created by combination of typed  and MModal dictation.  Transcription errors may be present.  If there are any questions, please contact me.

## 2024-07-19 NOTE — PATIENT INSTRUCTIONS
Symptom mgmt for URI/colds:     - You can take over-the-counter claritin, zyrtec, allegra, or xyzal as directed. These are antihistamines that can help with runny nose, nasal congestion, sneezing, and helps to dry up post-nasal drip, which usually causes sore throat and cough.              - You can use Flonase (fluticasone) nasal spray as directed for sinus congestion and postnasal drip. This is a steroid nasal spray that works locally over time to decrease the inflammation in your nose/sinuses and help with allergic symptoms. This is not an quick- relief spray like afrin, but it works well if used daily.  Discontinue if you develop nose bleed  - use nasal saline prior to Flonase.  - Use Ocean Spray Nasal Saline 1-3 puffs each nostril every 2-3 hours then blow out onto tissue. This is to irrigate the nasal passage way to clear the sinus openings. Use until sinus problem resolved.     - you can take plain Mucinex (guaifenesin) 1200 mg twice a day to help loosen mucous.      -warm salt water gargles can help with sore throat     - warm tea with honey can help with cough. Honey is a natural cough suppressant.     - Dextromethorphan (DM) is a cough suppressant over the counter (ie. mucinex DM, robitussin, delsym; dayquil/nyquil has DM as well.)     - Go to the ER if you develop new or worsening symptoms.

## 2024-07-22 ENCOUNTER — TELEPHONE (OUTPATIENT)
Dept: FAMILY MEDICINE | Facility: CLINIC | Age: 69
End: 2024-07-22
Payer: MEDICARE

## 2024-07-22 DIAGNOSIS — J20.8 ACUTE BACTERIAL BRONCHITIS: ICD-10-CM

## 2024-07-22 DIAGNOSIS — B96.89 ACUTE BACTERIAL BRONCHITIS: ICD-10-CM

## 2024-07-22 RX ORDER — DOXYCYCLINE 100 MG/1
100 CAPSULE ORAL EVERY 12 HOURS
Qty: 14 CAPSULE | Refills: 0 | Status: SHIPPED | OUTPATIENT
Start: 2024-07-22 | End: 2024-07-29

## 2024-07-22 NOTE — TELEPHONE ENCOUNTER
----- Message from Shahnaz Sheldon sent at 7/22/2024 11:47 AM CDT -----  Regarding: SELF [phone]  Type: Patient Call Back     Who called:SELF     What is the request in detail:pt calling because he lost the set of doxycycline (VIBRAMYCIN) 100 MG Cap that was recently prescribed to him and would a new set sent into pharmacy     Can the clinic reply by [facility]?-No     Would the patient rather a call back or a response via [facility]?-Call Back     Best call back number: [phone]     Additional Information:   [id number] - [place] [address] AT [facility] [address]  [address] [place] [address]  Phone: [phone] Fax: [phone]

## 2024-08-02 ENCOUNTER — CLINICAL SUPPORT (OUTPATIENT)
Dept: FAMILY MEDICINE | Facility: CLINIC | Age: 69
End: 2024-08-02
Payer: MEDICARE

## 2024-08-02 VITALS — SYSTOLIC BLOOD PRESSURE: 122 MMHG | HEART RATE: 75 BPM | DIASTOLIC BLOOD PRESSURE: 72 MMHG

## 2024-08-02 DIAGNOSIS — I10 PRIMARY HYPERTENSION: Primary | ICD-10-CM

## 2024-08-02 PROCEDURE — 99999 PR PBB SHADOW E&M-EST. PATIENT-LVL II: CPT | Mod: PBBFAC,,,

## 2024-08-02 NOTE — PROGRESS NOTES
Review of patient's allergies indicates:  No Known Allergies  Creatinine   Date Value Ref Range Status   04/03/2024 0.9 0.5 - 1.4 mg/dL Final     Sodium   Date Value Ref Range Status   04/03/2024 137 136 - 145 mmol/L Final     Potassium   Date Value Ref Range Status   04/03/2024 4.5 3.5 - 5.1 mmol/L Final   ]  Patient verifies taking blood pressure medications on a regular basis at the same time of the day.     Current Outpatient Medications:     ascorbic acid, vitamin C, (VITAMIN C) 1000 MG tablet, Take 1,000 mg by mouth., Disp: , Rfl:     cyanocobalamin (VITAMIN B-12) 1000 MCG tablet, Take 100 mcg by mouth., Disp: , Rfl:     famotidine (PEPCID) 20 MG tablet, Take 1 tablet (20 mg total) by mouth 2 (two) times daily as needed for Heartburn., Disp: 180 tablet, Rfl: 1    ferrous gluconate (FERGON) 324 MG tablet, Take 324 mg by mouth daily with breakfast., Disp: , Rfl:     ibuprofen (ADVIL,MOTRIN) 200 MG tablet, Take 200 mg by mouth every 6 (six) hours as needed for Pain., Disp: , Rfl:     metFORMIN (GLUCOPHAGE-XR) 750 MG ER 24hr tablet, 2 pills in the AM, Disp: 180 tablet, Rfl: 1    multivitamin (ONE DAILY MULTIVITAMIN) per tablet, Take 1 tablet by mouth once daily., Disp: , Rfl:     rosuvastatin (CRESTOR) 20 MG tablet, Take 1 tablet (20 mg total) by mouth once daily., Disp: 30 tablet, Rfl: 0    valsartan (DIOVAN) 320 MG tablet, Take 1 tablet (320 mg total) by mouth once daily., Disp: 90 tablet, Rfl: 3    vitamin A 8000 UNIT capsule, Take 8,000 Units by mouth once daily., Disp: , Rfl:     vitamin D (VITAMIN D3) 1000 units Tab, Take 1,000 Units by mouth., Disp: , Rfl:   No current facility-administered medications for this visit.    Facility-Administered Medications Ordered in Other Visits:     0.9%  NaCl infusion, 500 mL, Intravenous, Continuous, Gee Jj MD  Does patient have record of home blood pressure readings no.   Last dose of blood pressure medication was taken at 08/02/2024 @0730.  Patient is  asymptomatic.       BP: 122/72 , Pulse: 75 .      Dr. Espino notified.

## 2024-09-23 ENCOUNTER — LAB VISIT (OUTPATIENT)
Dept: LAB | Facility: HOSPITAL | Age: 69
End: 2024-09-23
Attending: INTERNAL MEDICINE
Payer: MEDICARE

## 2024-09-23 DIAGNOSIS — E11.9 CONTROLLED TYPE 2 DIABETES MELLITUS WITHOUT COMPLICATION, WITHOUT LONG-TERM CURRENT USE OF INSULIN: ICD-10-CM

## 2024-09-23 LAB
ESTIMATED AVG GLUCOSE: 134 MG/DL (ref 68–131)
HBA1C MFR BLD: 6.3 % (ref 4–5.6)

## 2024-09-23 PROCEDURE — 83036 HEMOGLOBIN GLYCOSYLATED A1C: CPT | Mod: HCNC | Performed by: INTERNAL MEDICINE

## 2024-09-23 PROCEDURE — 36415 COLL VENOUS BLD VENIPUNCTURE: CPT | Mod: HCNC,PO | Performed by: INTERNAL MEDICINE

## 2024-10-05 ENCOUNTER — PATIENT MESSAGE (OUTPATIENT)
Dept: ADMINISTRATIVE | Facility: OTHER | Age: 69
End: 2024-10-05
Payer: MEDICARE

## 2024-10-05 NOTE — TELEPHONE ENCOUNTER
No care due was identified.  Health [facility] Embedded Care Due Messages. Reference number: [id number].   10/05/2024 6:36:57 PM CDT

## 2024-10-07 RX ORDER — METFORMIN HYDROCHLORIDE 750 MG/1
TABLET, EXTENDED RELEASE ORAL
Qty: 180 TABLET | Refills: 1 | Status: SHIPPED | OUTPATIENT
Start: 2024-10-07

## 2024-10-08 ENCOUNTER — PATIENT MESSAGE (OUTPATIENT)
Dept: ADMINISTRATIVE | Facility: OTHER | Age: 69
End: 2024-10-08
Payer: MEDICARE

## 2024-10-25 ENCOUNTER — TELEPHONE (OUTPATIENT)
Dept: FAMILY MEDICINE | Facility: CLINIC | Age: 69
End: 2024-10-25
Payer: MEDICARE

## 2024-10-28 ENCOUNTER — OFFICE VISIT (OUTPATIENT)
Dept: FAMILY MEDICINE | Facility: CLINIC | Age: 69
End: 2024-10-28
Payer: MEDICARE

## 2024-10-28 VITALS
TEMPERATURE: 98 F | OXYGEN SATURATION: 97 % | SYSTOLIC BLOOD PRESSURE: 132 MMHG | HEIGHT: 69 IN | WEIGHT: 170.63 LBS | DIASTOLIC BLOOD PRESSURE: 72 MMHG | BODY MASS INDEX: 25.27 KG/M2 | HEART RATE: 79 BPM

## 2024-10-28 DIAGNOSIS — Z12.5 SCREENING FOR PROSTATE CANCER: ICD-10-CM

## 2024-10-28 DIAGNOSIS — I10 PRIMARY HYPERTENSION: ICD-10-CM

## 2024-10-28 DIAGNOSIS — Z86.0100 HISTORY OF COLONIC POLYPS: ICD-10-CM

## 2024-10-28 DIAGNOSIS — E78.5 HYPERLIPIDEMIA, UNSPECIFIED HYPERLIPIDEMIA TYPE: ICD-10-CM

## 2024-10-28 DIAGNOSIS — D64.9 ANEMIA, UNSPECIFIED TYPE: ICD-10-CM

## 2024-10-28 DIAGNOSIS — E11.65 UNCONTROLLED TYPE 2 DIABETES MELLITUS WITH HYPERGLYCEMIA: Primary | ICD-10-CM

## 2024-10-28 PROCEDURE — 3288F FALL RISK ASSESSMENT DOCD: CPT | Mod: HCNC,CPTII,S$GLB, | Performed by: INTERNAL MEDICINE

## 2024-10-28 PROCEDURE — 3078F DIAST BP <80 MM HG: CPT | Mod: HCNC,CPTII,S$GLB, | Performed by: INTERNAL MEDICINE

## 2024-10-28 PROCEDURE — 3044F HG A1C LEVEL LT 7.0%: CPT | Mod: HCNC,CPTII,S$GLB, | Performed by: INTERNAL MEDICINE

## 2024-10-28 PROCEDURE — 1159F MED LIST DOCD IN RCRD: CPT | Mod: HCNC,CPTII,S$GLB, | Performed by: INTERNAL MEDICINE

## 2024-10-28 PROCEDURE — 4010F ACE/ARB THERAPY RXD/TAKEN: CPT | Mod: HCNC,CPTII,S$GLB, | Performed by: INTERNAL MEDICINE

## 2024-10-28 PROCEDURE — 1101F PT FALLS ASSESS-DOCD LE1/YR: CPT | Mod: HCNC,CPTII,S$GLB, | Performed by: INTERNAL MEDICINE

## 2024-10-28 PROCEDURE — 99214 OFFICE O/P EST MOD 30 MIN: CPT | Mod: HCNC,S$GLB,, | Performed by: INTERNAL MEDICINE

## 2024-10-28 PROCEDURE — 99999 PR PBB SHADOW E&M-EST. PATIENT-LVL III: CPT | Mod: PBBFAC,HCNC,, | Performed by: INTERNAL MEDICINE

## 2024-10-28 PROCEDURE — 1126F AMNT PAIN NOTED NONE PRSNT: CPT | Mod: HCNC,CPTII,S$GLB, | Performed by: INTERNAL MEDICINE

## 2024-10-28 PROCEDURE — 3008F BODY MASS INDEX DOCD: CPT | Mod: HCNC,CPTII,S$GLB, | Performed by: INTERNAL MEDICINE

## 2024-10-28 PROCEDURE — 3075F SYST BP GE 130 - 139MM HG: CPT | Mod: HCNC,CPTII,S$GLB, | Performed by: INTERNAL MEDICINE

## 2024-11-05 ENCOUNTER — PATIENT OUTREACH (OUTPATIENT)
Dept: ADMINISTRATIVE | Facility: HOSPITAL | Age: 69
End: 2024-11-05
Payer: MEDICARE

## 2024-11-05 DIAGNOSIS — E11.9 CONTROLLED TYPE 2 DIABETES MELLITUS WITHOUT COMPLICATION, WITHOUT LONG-TERM CURRENT USE OF INSULIN: Primary | ICD-10-CM

## 2024-11-06 ENCOUNTER — LAB VISIT (OUTPATIENT)
Dept: LAB | Facility: HOSPITAL | Age: 69
End: 2024-11-06
Attending: INTERNAL MEDICINE
Payer: MEDICARE

## 2024-11-06 DIAGNOSIS — E11.9 CONTROLLED TYPE 2 DIABETES MELLITUS WITHOUT COMPLICATION, WITHOUT LONG-TERM CURRENT USE OF INSULIN: ICD-10-CM

## 2024-11-06 LAB
ALBUMIN/CREAT UR: ABNORMAL UG/MG (ref 0–30)
CREAT UR-MCNC: 19 MG/DL (ref 23–375)
MICROALBUMIN UR DL<=1MG/L-MCNC: <5 UG/ML

## 2024-11-06 PROCEDURE — 82570 ASSAY OF URINE CREATININE: CPT | Mod: HCNC | Performed by: INTERNAL MEDICINE

## 2024-12-14 DIAGNOSIS — E78.5 HYPERLIPIDEMIA, UNSPECIFIED HYPERLIPIDEMIA TYPE: ICD-10-CM

## 2024-12-14 NOTE — TELEPHONE ENCOUNTER
No care due was identified.  [facility] Embedded Care Due Messages. Reference number: [id number].   12/14/2024 12:55:45 PM CST

## 2024-12-16 RX ORDER — METFORMIN HYDROCHLORIDE 750 MG/1
TABLET, EXTENDED RELEASE ORAL
Qty: 180 TABLET | Refills: 1 | Status: SHIPPED | OUTPATIENT
Start: 2024-12-16

## 2024-12-16 RX ORDER — VALSARTAN 320 MG/1
320 TABLET ORAL DAILY
Qty: 90 TABLET | Refills: 3 | Status: SHIPPED | OUTPATIENT
Start: 2024-12-16 | End: 2025-12-16

## 2024-12-16 RX ORDER — FAMOTIDINE 20 MG/1
20 TABLET, FILM COATED ORAL 2 TIMES DAILY PRN
Qty: 180 TABLET | Refills: 1 | Status: SHIPPED | OUTPATIENT
Start: 2024-12-16

## 2024-12-16 RX ORDER — ROSUVASTATIN CALCIUM 20 MG/1
20 TABLET, COATED ORAL DAILY
Qty: 90 TABLET | Refills: 2 | Status: SHIPPED | OUTPATIENT
Start: 2024-12-16

## 2025-03-09 ENCOUNTER — PATIENT MESSAGE (OUTPATIENT)
Dept: FAMILY MEDICINE | Facility: CLINIC | Age: 70
End: 2025-03-09
Payer: MEDICARE

## 2025-03-17 RX ORDER — GABAPENTIN 300 MG/1
300 CAPSULE ORAL 3 TIMES DAILY
Qty: 90 CAPSULE | Refills: 11 | Status: SHIPPED | OUTPATIENT
Start: 2025-03-17 | End: 2026-03-17

## 2025-04-02 ENCOUNTER — PATIENT MESSAGE (OUTPATIENT)
Dept: ADMINISTRATIVE | Facility: OTHER | Age: 70
End: 2025-04-02
Payer: MEDICARE

## 2025-04-02 NOTE — TELEPHONE ENCOUNTER
Care Due:                  Date            Visit Type   Department     Provider  --------------------------------------------------------------------------------                                [facility]                              FOLLOWUP/OF  / INTERNAL MED Nils Gross  Last Visit: 10-      FICE VISIT   / PEDS         [place]  Next Visit: None Scheduled  None         None Found                                                            Last  Test          Frequency    Reason                     Performed    Due Date  --------------------------------------------------------------------------------    CMP.........  12 months..  famotidine, metFORMIN,     04- 03-                             rosuvastatin, valsartan..    HBA1C.......  6 months...  metFORMIN................  09- 03-    Lipid Panel.  12 months..  rosuvastatin.............  04- 03-    Health [facility] Embedded Care Due Messages. Reference number: [id number].   4/02/2025 7:48:32 AM CDT

## 2025-04-03 RX ORDER — VALSARTAN 320 MG/1
320 TABLET ORAL DAILY
Qty: 90 TABLET | Refills: 3 | Status: SHIPPED | OUTPATIENT
Start: 2025-04-03 | End: 2026-04-03

## 2025-04-17 ENCOUNTER — E-VISIT (OUTPATIENT)
Dept: FAMILY MEDICINE | Facility: CLINIC | Age: 70
End: 2025-04-17
Payer: MEDICARE

## 2025-04-17 DIAGNOSIS — M54.30 SCIATICA, UNSPECIFIED LATERALITY: Primary | ICD-10-CM

## 2025-04-17 NOTE — PROGRESS NOTES
Patient ID: Dariusz Urbina is a 69 y.o. male.    Chief Complaint: General Illness (Entered automatically based on patient selection in [facility].)    The patient initiated a request through [facility] on 4/17/2025 for evaluation and management with a chief complaint of General Illness (Entered automatically based on patient selection in [facility].)     I evaluated the questionnaire submission on April r/17.    [facility] E[facility]-Muscle,Back,Joint    4/17/2025  2:12 PM CDT - Filed by Patient   What do you need help with? Back Problem   Do you agree to participate in an E-Visit? Yes   If you have any of the following symptoms, please present to your local emergency room or call 911: I acknowledge   What is the main issue you would like addressed today? I have a pain in my hip and leg. A doctor at [facility] tried a steroid injection in my help about 10 days ago.  No improvement. He suggests a MRI and possibly an injection in my back.  They are not with my health plan.   Where are you having pain? Buttocks   Does the pain extend into your legs? Yes, into my right leg   How bad is the pain? The pain is moderate   Did you have an injury that caused the pain? No, I cannot remember an injury   How long has the pain been present? More than 1 week but less then 4 weeks   Have you had back pain in the past? Yes, I have infrequently had pain similar to this before   Please list any medications or treatments you have used for back pain and indicate if it was effective or not. Advill some relief   Do you have a fever? No   Do you have any of the following? None of the above   What makes the pain worse? Strenuous activity   What makes the pain better? Hot or cold compress;  Lying in bed   Have you ever been diagnosed with cancer? No   Have you ever been diagnosed with degenerative disc disease (arthritis of the spine)? No   Have you ever been diagnosed with osteoporosis or any other bone weakness? No   Have you ever  had surgery on your back or spine? Yes   What is your usual health status? I am active and can move normally   Provide any additional information you feel is important.    Please attach any relevant images or files    Are you able to take your vital signs? Yes   Systolic Blood Pressure: 130   Diastolic Blood Pressure: 89   Weight: 165   Height: 69   Pulse: 70   Temperature: 98   Respiration rate:    Pulse Oxygen:          Encounter Diagnosis   Name Primary?    Sciatica, unspecified laterality Yes        No orders of the defined types were placed in this encounter.           No follow-ups on file.      E-Visit Time Tracking:  Over 21 minutes reviewing issue and reply to patient.  See note sent to patient regarding disposition and plan for this problem

## 2025-04-21 ENCOUNTER — RESULTS FOLLOW-UP (OUTPATIENT)
Dept: FAMILY MEDICINE | Facility: CLINIC | Age: 70
End: 2025-04-21

## 2025-04-21 ENCOUNTER — LAB VISIT (OUTPATIENT)
Dept: LAB | Facility: HOSPITAL | Age: 70
End: 2025-04-21
Attending: INTERNAL MEDICINE
Payer: MEDICARE

## 2025-04-21 ENCOUNTER — OFFICE VISIT (OUTPATIENT)
Dept: FAMILY MEDICINE | Facility: CLINIC | Age: 70
End: 2025-04-21
Payer: MEDICARE

## 2025-04-21 VITALS
OXYGEN SATURATION: 97 % | BODY MASS INDEX: 24.39 KG/M2 | WEIGHT: 164.69 LBS | DIASTOLIC BLOOD PRESSURE: 70 MMHG | TEMPERATURE: 98 F | HEIGHT: 69 IN | SYSTOLIC BLOOD PRESSURE: 128 MMHG | HEART RATE: 72 BPM

## 2025-04-21 DIAGNOSIS — N40.0 BENIGN PROSTATIC HYPERPLASIA, UNSPECIFIED WHETHER LOWER URINARY TRACT SYMPTOMS PRESENT: ICD-10-CM

## 2025-04-21 DIAGNOSIS — E78.5 HYPERLIPIDEMIA, UNSPECIFIED HYPERLIPIDEMIA TYPE: ICD-10-CM

## 2025-04-21 DIAGNOSIS — S76.311A STRAIN OF RIGHT HAMSTRING MUSCLE, INITIAL ENCOUNTER: Primary | ICD-10-CM

## 2025-04-21 DIAGNOSIS — D64.9 ANEMIA, UNSPECIFIED TYPE: ICD-10-CM

## 2025-04-21 DIAGNOSIS — E11.65 UNCONTROLLED TYPE 2 DIABETES MELLITUS WITH HYPERGLYCEMIA: ICD-10-CM

## 2025-04-21 DIAGNOSIS — I10 PRIMARY HYPERTENSION: ICD-10-CM

## 2025-04-21 DIAGNOSIS — Z12.5 SCREENING FOR PROSTATE CANCER: ICD-10-CM

## 2025-04-21 DIAGNOSIS — E11.36 TYPE 2 DIABETES MELLITUS WITH DIABETIC CATARACT, WITHOUT LONG-TERM CURRENT USE OF INSULIN: ICD-10-CM

## 2025-04-21 DIAGNOSIS — M79.604 RIGHT LEG PAIN: ICD-10-CM

## 2025-04-21 LAB
ABSOLUTE EOSINOPHIL (OHS): 0.11 K/UL
ABSOLUTE MONOCYTE (OHS): 0.95 K/UL (ref 0.3–1)
ABSOLUTE NEUTROPHIL COUNT (OHS): 8.13 K/UL (ref 1.8–7.7)
ALBUMIN SERPL BCP-MCNC: 3.9 G/DL (ref 3.5–5.2)
ALP SERPL-CCNC: 51 UNIT/L (ref 40–150)
ALT SERPL W/O P-5'-P-CCNC: 25 UNIT/L (ref 10–44)
ANION GAP (OHS): 9 MMOL/L (ref 8–16)
AST SERPL-CCNC: 20 UNIT/L (ref 11–45)
BASOPHILS # BLD AUTO: 0.06 K/UL
BASOPHILS NFR BLD AUTO: 0.6 %
BILIRUB SERPL-MCNC: 0.5 MG/DL (ref 0.1–1)
BUN SERPL-MCNC: 14 MG/DL (ref 8–23)
CALCIUM SERPL-MCNC: 9.9 MG/DL (ref 8.7–10.5)
CHLORIDE SERPL-SCNC: 102 MMOL/L (ref 95–110)
CHOLEST SERPL-MCNC: 151 MG/DL (ref 120–199)
CHOLEST/HDLC SERPL: 3 {RATIO} (ref 2–5)
CO2 SERPL-SCNC: 26 MMOL/L (ref 23–29)
CREAT SERPL-MCNC: 0.9 MG/DL (ref 0.5–1.4)
EAG (OHS): 157 MG/DL (ref 68–131)
ERYTHROCYTE [DISTWIDTH] IN BLOOD BY AUTOMATED COUNT: 12.9 % (ref 11.5–14.5)
GFR SERPLBLD CREATININE-BSD FMLA CKD-EPI: >60 ML/MIN/1.73/M2
GLUCOSE SERPL-MCNC: 136 MG/DL (ref 70–110)
HBA1C MFR BLD: 7.1 % (ref 4–5.6)
HCT VFR BLD AUTO: 43.7 % (ref 40–54)
HDLC SERPL-MCNC: 50 MG/DL (ref 40–75)
HDLC SERPL: 33.1 % (ref 20–50)
HGB BLD-MCNC: 14.4 GM/DL (ref 14–18)
IMM GRANULOCYTES # BLD AUTO: 0.03 K/UL (ref 0–0.04)
IMM GRANULOCYTES NFR BLD AUTO: 0.3 % (ref 0–0.5)
LDLC SERPL CALC-MCNC: 79 MG/DL (ref 63–159)
LYMPHOCYTES # BLD AUTO: 1.58 K/UL (ref 1–4.8)
MCH RBC QN AUTO: 30.1 PG (ref 27–31)
MCHC RBC AUTO-ENTMCNC: 33 G/DL (ref 32–36)
MCV RBC AUTO: 91 FL (ref 82–98)
NONHDLC SERPL-MCNC: 101 MG/DL
NUCLEATED RBC (/100WBC) (OHS): 0 /100 WBC
PLATELET # BLD AUTO: 302 K/UL (ref 150–450)
PMV BLD AUTO: 9.6 FL (ref 9.2–12.9)
POTASSIUM SERPL-SCNC: 4.2 MMOL/L (ref 3.5–5.1)
PROT SERPL-MCNC: 7.4 GM/DL (ref 6–8.4)
PSA SERPL-MCNC: 2.76 NG/ML
RBC # BLD AUTO: 4.78 M/UL (ref 4.6–6.2)
RELATIVE EOSINOPHIL (OHS): 1 %
RELATIVE LYMPHOCYTE (OHS): 14.5 % (ref 18–48)
RELATIVE MONOCYTE (OHS): 8.7 % (ref 4–15)
RELATIVE NEUTROPHIL (OHS): 74.9 % (ref 38–73)
SODIUM SERPL-SCNC: 137 MMOL/L (ref 136–145)
TRIGL SERPL-MCNC: 110 MG/DL (ref 30–150)
TSH SERPL-ACNC: 2.29 UIU/ML (ref 0.4–4)
WBC # BLD AUTO: 10.86 K/UL (ref 3.9–12.7)

## 2025-04-21 PROCEDURE — 80053 COMPREHEN METABOLIC PANEL: CPT | Mod: HCNC

## 2025-04-21 PROCEDURE — 4010F ACE/ARB THERAPY RXD/TAKEN: CPT | Mod: HCNC,CPTII,S$GLB, | Performed by: INTERNAL MEDICINE

## 2025-04-21 PROCEDURE — 99214 OFFICE O/P EST MOD 30 MIN: CPT | Mod: HCNC,S$GLB,, | Performed by: INTERNAL MEDICINE

## 2025-04-21 PROCEDURE — 1125F AMNT PAIN NOTED PAIN PRSNT: CPT | Mod: HCNC,CPTII,S$GLB, | Performed by: INTERNAL MEDICINE

## 2025-04-21 PROCEDURE — 1101F PT FALLS ASSESS-DOCD LE1/YR: CPT | Mod: HCNC,CPTII,S$GLB, | Performed by: INTERNAL MEDICINE

## 2025-04-21 PROCEDURE — 80061 LIPID PANEL: CPT | Mod: HCNC

## 2025-04-21 PROCEDURE — 1159F MED LIST DOCD IN RCRD: CPT | Mod: HCNC,CPTII,S$GLB, | Performed by: INTERNAL MEDICINE

## 2025-04-21 PROCEDURE — 83036 HEMOGLOBIN GLYCOSYLATED A1C: CPT | Mod: HCNC

## 2025-04-21 PROCEDURE — 84153 ASSAY OF PSA TOTAL: CPT | Mod: HCNC

## 2025-04-21 PROCEDURE — 3078F DIAST BP <80 MM HG: CPT | Mod: HCNC,CPTII,S$GLB, | Performed by: INTERNAL MEDICINE

## 2025-04-21 PROCEDURE — 3072F LOW RISK FOR RETINOPATHY: CPT | Mod: HCNC,CPTII,S$GLB, | Performed by: INTERNAL MEDICINE

## 2025-04-21 PROCEDURE — G2211 COMPLEX E/M VISIT ADD ON: HCPCS | Mod: HCNC,S$GLB,, | Performed by: INTERNAL MEDICINE

## 2025-04-21 PROCEDURE — 84443 ASSAY THYROID STIM HORMONE: CPT | Mod: HCNC

## 2025-04-21 PROCEDURE — 3008F BODY MASS INDEX DOCD: CPT | Mod: HCNC,CPTII,S$GLB, | Performed by: INTERNAL MEDICINE

## 2025-04-21 PROCEDURE — 85025 COMPLETE CBC W/AUTO DIFF WBC: CPT | Mod: HCNC

## 2025-04-21 PROCEDURE — 99999 PR PBB SHADOW E&M-EST. PATIENT-LVL III: CPT | Mod: PBBFAC,HCNC,, | Performed by: INTERNAL MEDICINE

## 2025-04-21 PROCEDURE — 36415 COLL VENOUS BLD VENIPUNCTURE: CPT | Mod: HCNC,PO

## 2025-04-21 PROCEDURE — 3288F FALL RISK ASSESSMENT DOCD: CPT | Mod: HCNC,CPTII,S$GLB, | Performed by: INTERNAL MEDICINE

## 2025-04-21 PROCEDURE — 3074F SYST BP LT 130 MM HG: CPT | Mod: HCNC,CPTII,S$GLB, | Performed by: INTERNAL MEDICINE

## 2025-04-21 NOTE — PROGRESS NOTES
Chief complaint right leg pain     Physical  -8/23    Patient is a 69  -year-old white male .  For three or four weeks patient has had some right lateral thigh pain posteriorly.  It seems to be over the lateral hamstring origin right behind the greater trochanter of the femur extending down laterally to the knee.  He thought he might have sciatica.  He had a pain over the trochanteric bursa years ago and he had an injection there with good response so he went back to the [facility] and they gave him a steroid injection there as well with no improvement.  I think they thereafter suggest a maybe get an MRI to make sure it is not a pinched nerve in the upper lumbar region but patient really has no radiation of the pain down the leg and it is very atypical for sciatica.  He also has a reproducible pain when I press on the lateral hamstring muscle.  Negative straight leg testing.  Discussed the pathophysiology of a muscle overuse injury as well as an appropriate time for healing.  He has been doing some stretching but only for about two weeks.    Patient sent a message the other day and he was asked to come in today in an opening on Monday     I have a pain in my hip and leg. A doctor at [facility] tried a steroid injection in my help about 10 days ago.  No improvement. He suggests a MRI and possibly an injection in my back.  They are not with my health plan.   Buttocks   Yes, into my right leg   The pain is moderate   No, I cannot remember an injury   More than 1 week but less then 4 weeks   Yes, I have infrequently had pain similar to this before   Advill some relief          Patient's last A1c was 6.5 back in February, 6.3, 7.2, 6.3 September 2024 .  Discussed overall starch and sugar reduction.  He is here with his wife.  Apparently his daughter who is  to an oncologist was concerned that is sugar levels might not be adequate and was concerned about the long-term complications of diabetes.   She might well have seen his A1c of 7.2 but she can be reassured that it is back down to near normal at 6.3.  We did discuss that our goals are to get it normal with a normal A1c.  He is on metformin 1500 mg per day.  We discussed potential addition of glimepiride but also for the potential for low sugars with that.  We discussed Januvia would be perfect but sometimes still can be cost prohibitive at this point as with numerous other great medications for diabetes.  He would like to continue to try diet and exercise and discussed the importance of checking an A1c every three months.  I will put the order in for January and then when he returns in April he will be due for all of his blood work PSA and physical.  He monitors his sugars we discussed the need to check at different times of the day.  We discussed how the sugars he is getting at home are in line with his estimated glucose average as represented by his A1c of 6.3.     Lipids are excellent apparently on crestor by prior review of lab      Slight hemoglobin reduction appears stable as of 4/24     ..  He is up-to-date on his colon screening.            Also monitoring blood pressure at home and in the past was not perfect so we did prior increase his valsartan to 160 with good response.        ROS:   CONST: weight stable.  No other myalgias or arthralgias, no neurological symptoms    PAST MEDICAL HISTORY:                                                        1.  Hyperlipidemia.                                                          2.  Back surgery for slipped disk, Dr. Sams.                            3.  History of ED, seen prior by Urology.                                    4.  History of suspicious stress test and atypical chest pain, normal cath 2003 by Dr. Lugo, likely done at [place].                            5.  Vasectomy.                                                               6.  Normal colonoscopy, 2004 And 2012, 2 POLYPS   -5 yrs                                              7.  Erosive gastropathy, outside EGD along with a normal emptying study.     8.  Slight anemia in 2009, but did donate blood prior.  9.  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 right hip replacement- Dr Thomas  12.   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                                                                                                    FAMILY HISTORY:  Father  of lung cancer and coronary disease at 73.      Sister  of lung cancer, mom  of breast cancer and she had diabetes.   A brother is apparently okay.                                                                                               Gen: no distress  Both hips full range of motion with no internal and external pain.  The right greater trochanter is very nontender but just posterior to that where the hamstring would expect to partially originate down across the lateral hamstring muscle to behind the knee is reproducibly tight and tender.  Negative straight leg testing.  Skin no rashes, warm to touch, no edema, gait is normal    Labs and x-ray reports reviewed         Diagnoses and all orders for this visit:    Strain of right hamstring muscle, initial encounter, reassured that his symptoms do not appear to be trochanteric bursitis at all which is also supported by his negative response to the bursa injection.  Clearly appears to be located over the lateral hamstring at its origin.  They might have mentioned piriformis strain to him as well but again no nerve impingement and the distribution of his discomfort appears to be more so over the hamstring.  It is tight when he goes to get up.   It is better when he squats which would stretch the hamstring.  We discussed a referral to physical therapy but he could also probably look up hamstring stretches and piriformis stretches on his phone and do those but we can always refer if it is an ongoing or difficult problem for him.  The condition will heal on its own if allowed to.  Frequent heat and stretching in the shower prior to exercises would be important to prevent re-injury.  Again no need for MRI, no nerve impingement suspected    Right leg pain, as above    Uncontrolled type 2 diabetes mellitus with hyperglycemia, overdue for assessment  -     Hemoglobin A1C; Future  -     Comprehensive Metabolic Panel; Future  -     Lipid Panel; Future  -     CBC Auto Differential; Future  -     TSH; Future  -     Prostate Specific Antigen, Diagnostic; Future    Anemia, unspecified type, reassess    Primary hypertension, chronic and stable but check labs and TSH    Type 2 diabetes mellitus with diabetic cataract, without long-term current use of insulin, overdue  -     Hemoglobin A1C; Future  -     Comprehensive Metabolic Panel; Future  -     Lipid Panel; Future  -     CBC Auto Differential; Future  -     TSH; Future  -     Prostate Specific Antigen, Diagnostic; Future    Screening for prostate cancer, overdue for PSA  -     Prostate Specific Antigen, Diagnostic; Future    Benign prostatic hyperplasia, unspecified whether lower urinary tract symptoms present  -     Prostate Specific Antigen, Diagnostic; Future    Hyperlipidemia, unspecified hyperlipidemia type, due to reassess  -     Lipid Panel; Future     This is a patient with chronic and complex diagnoses whose overall, ongoing care is being managed and monitored by me and our primary care clinic. As such,   is the appropriate add-on code to accompany the other E/M billing for this visit.

## 2025-04-23 DIAGNOSIS — E11.65 UNCONTROLLED TYPE 2 DIABETES MELLITUS WITH HYPERGLYCEMIA: Primary | ICD-10-CM

## 2025-04-23 RX ORDER — METFORMIN HYDROCHLORIDE 750 MG/1
TABLET, EXTENDED RELEASE ORAL
Qty: 180 TABLET | Refills: 1 | Status: SHIPPED | OUTPATIENT
Start: 2025-04-23

## 2025-04-23 NOTE — TELEPHONE ENCOUNTER
No care due was identified.  [facility] Embedded Care Due Messages. Reference number: [id number].   4/23/2025 10:08:56 AM CDT

## 2025-06-22 DIAGNOSIS — E78.5 HYPERLIPIDEMIA, UNSPECIFIED HYPERLIPIDEMIA TYPE: ICD-10-CM

## 2025-06-22 NOTE — TELEPHONE ENCOUNTER
No care due was identified.  [facility] Embedded Care Due Messages. Reference number: [id number].   6/22/2025 2:32:44 PM CDT

## 2025-06-23 ENCOUNTER — OFFICE VISIT (OUTPATIENT)
Dept: FAMILY MEDICINE | Facility: CLINIC | Age: 70
End: 2025-06-23
Payer: MEDICARE

## 2025-06-23 ENCOUNTER — HOSPITAL ENCOUNTER (OUTPATIENT)
Dept: RADIOLOGY | Facility: HOSPITAL | Age: 70
Discharge: HOME | End: 2025-06-23
Payer: MEDICARE

## 2025-06-23 VITALS
WEIGHT: 163.94 LBS | DIASTOLIC BLOOD PRESSURE: 68 MMHG | SYSTOLIC BLOOD PRESSURE: 122 MMHG | BODY MASS INDEX: 24.28 KG/M2 | OXYGEN SATURATION: 97 % | HEART RATE: 80 BPM | TEMPERATURE: 98 F | RESPIRATION RATE: 16 BRPM | HEIGHT: 69 IN

## 2025-06-23 DIAGNOSIS — L57.0 ACTINIC KERATOSIS: ICD-10-CM

## 2025-06-23 DIAGNOSIS — G89.29 CHRONIC RIGHT SHOULDER PAIN: ICD-10-CM

## 2025-06-23 DIAGNOSIS — G89.29 CHRONIC RIGHT SHOULDER PAIN: Primary | ICD-10-CM

## 2025-06-23 DIAGNOSIS — M25.511 CHRONIC RIGHT SHOULDER PAIN: Primary | ICD-10-CM

## 2025-06-23 DIAGNOSIS — M25.511 CHRONIC RIGHT SHOULDER PAIN: ICD-10-CM

## 2025-06-23 PROCEDURE — 73030 X-RAY EXAM OF SHOULDER: CPT | Mod: TC,HCNC,FY,RT

## 2025-06-23 PROCEDURE — 3051F HG A1C>EQUAL 7.0%<8.0%: CPT | Mod: CPTII,HCNC,S$GLB,

## 2025-06-23 PROCEDURE — 4010F ACE/ARB THERAPY RXD/TAKEN: CPT | Mod: CPTII,HCNC,S$GLB,

## 2025-06-23 PROCEDURE — 73030 X-RAY EXAM OF SHOULDER: CPT | Mod: 26,HCNC,RT, | Performed by: RADIOLOGY

## 2025-06-23 PROCEDURE — G2211 COMPLEX E/M VISIT ADD ON: HCPCS | Mod: HCNC,S$GLB,,

## 2025-06-23 PROCEDURE — 1101F PT FALLS ASSESS-DOCD LE1/YR: CPT | Mod: CPTII,HCNC,S$GLB,

## 2025-06-23 PROCEDURE — 3074F SYST BP LT 130 MM HG: CPT | Mod: CPTII,HCNC,S$GLB,

## 2025-06-23 PROCEDURE — 99999 PR PBB SHADOW E&M-EST. PATIENT-LVL IV: CPT | Mod: PBBFAC,HCNC,,

## 2025-06-23 PROCEDURE — 3008F BODY MASS INDEX DOCD: CPT | Mod: CPTII,HCNC,S$GLB,

## 2025-06-23 PROCEDURE — 1126F AMNT PAIN NOTED NONE PRSNT: CPT | Mod: CPTII,HCNC,S$GLB,

## 2025-06-23 PROCEDURE — 3288F FALL RISK ASSESSMENT DOCD: CPT | Mod: CPTII,HCNC,S$GLB,

## 2025-06-23 PROCEDURE — 1159F MED LIST DOCD IN RCRD: CPT | Mod: CPTII,HCNC,S$GLB,

## 2025-06-23 PROCEDURE — 3072F LOW RISK FOR RETINOPATHY: CPT | Mod: CPTII,HCNC,S$GLB,

## 2025-06-23 PROCEDURE — 99214 OFFICE O/P EST MOD 30 MIN: CPT | Mod: HCNC,S$GLB,,

## 2025-06-23 PROCEDURE — 3078F DIAST BP <80 MM HG: CPT | Mod: CPTII,HCNC,S$GLB,

## 2025-06-24 RX ORDER — ROSUVASTATIN CALCIUM 20 MG/1
20 TABLET, COATED ORAL DAILY
Qty: 90 TABLET | Refills: 2 | Status: SHIPPED | OUTPATIENT
Start: 2025-06-24

## 2025-06-24 RX ORDER — VALSARTAN 320 MG/1
320 TABLET ORAL DAILY
Qty: 90 TABLET | Refills: 3 | Status: SHIPPED | OUTPATIENT
Start: 2025-06-24 | End: 2026-06-24

## 2025-06-24 NOTE — PROGRESS NOTES
Family Medicine      Patient Name: Dariusz Urbina  MRN: [id number]  : 1955    PCP: Nils Espino MD       HPI      Dariusz Urbina is a 70 y.o. male with multiple medical diagnoses as listed in the medical history and problem list that presents for   Chief Complaint   Patient presents with    Shoulder Pain     Over a month    Skin Tags         History of Present Illness    Patient presents today for right shoulder pain and skin lesions. He reports right shoulder pain that started approximately 30 days ago, likely from strain during push-ups or similar physical activity. Pain is localized to the upper shoulder area and described as mild. He denies taking any pain medication, despite having previous prescriptions for ibuprofen and gabapentin. He reports a single, long-standing rough patch of skin that has remained unchanged over time. He has family history of skin cancer. He uses sunscreen regularly and was previously seen by dermatologist Dr. Beatty. He expresses interest in skin cancer screening and prevention. The lesion was described as potentially an actinic keratosis.      ROS:  General: -fever, -chills, -fatigue, -weight gain, -weight loss  Eyes: -vision changes, -redness, -discharge  ENT: -ear pain, -nasal congestion, -sore throat  Cardiovascular: -chest pain, -palpitations, -lower extremity edema  Respiratory: -cough, -shortness of breath  Gastrointestinal: -abdominal pain, -nausea, -vomiting, -diarrhea, -constipation, -blood in stool  Genitourinary: -dysuria, -hematuria, -frequency  Musculoskeletal: +joint pain, -muscle pain, +pain with movement  Skin: -rash, +lesion, +skin texture changes  Neurological: -headache, -dizziness, -numbness, -tingling  Psychiatric: -anxiety, -depression, -sleep difficulty              History      Past Medical History:  Past Medical History:   Diagnosis Date    Controlled type 2 diabetes mellitus without complication, without long-term current use of insulin  2/8/2023    Decreased libido 11/11/2013    Dermatitis 10/12/2012    Hyperlipidemia 11/11/2013    Hypertension        Past Surgical History:  Past Surgical History:   Procedure Laterality Date    COLONOSCOPY      COLONOSCOPY N/A 6/16/2022    Procedure: COLONOSCOPY;  Surgeon: Omaira Mcclelland MD;  Location: [facility] ENDO;  Service: Endoscopy;  Laterality: N/A;  fully vaccinated  instructions emailed   [facility] provider from Dr. Alvarez to Dr. Mcclelland---    HIP REPLACEMENT ARTHROPLASTY  03/2019    LUMBAR FUSION      titanium rods    TONSILLECTOMY      TRANSFORAMINAL EPIDURAL INJECTION OF STEROID Left 7/1/2022    Procedure: TFESI Left  L3-4;  Surgeon: Mario Lamb DO;  Location: [facility] OR;  Service: Pain Management;  Laterality: Left;    TRANSFORAMINAL EPIDURAL INJECTION OF STEROID Left 7/29/2022    Procedure: INJECTION, STEROID, EPIDURAL, TRANSFORAMINAL APPROACH, LEFT L3-L4 CONTRAST DIRECT REF;  Surgeon: Jaspreet Amos MD;  Location: [facility] PAIN MGT;  Service: Pain Management;  Laterality: Left;       Social History:  Social History[1]    Family History:  No family history on file.    Allergies and Medications: (updated and reviewed)  Review of patient's allergies indicates:  No Known Allergies  Current Medications[2]    Patient Care Team:  Nils Espino MD as PCP - General (Internal Medicine)  Nils Espino MD as Hyperlipidemia Digital Medicine Responsible Provider (Internal Medicine)  Sania Costa as Hyperlipidemia Digital Medicine Clinician  [facility] [employer] [facility] as Diabetes Digital Medicine Contract  Nils Espino MD as Diabetes Digital Medicine Responsible Provider (Internal Medicine)  Sania Costa as Diabetes Digital Medicine Clinician         Exam      Review of Systems:  (as noted above)      Physical Exam:  Physical Exam  Vitals and nursing note reviewed.   Constitutional:       Appearance: Normal appearance. He is normal weight.   HENT:      Head:  "Normocephalic and atraumatic.   Eyes:      Extraocular Movements: Extraocular movements intact.      Pupils: Pupils are equal, round, and reactive to light.   Cardiovascular:      Rate and Rhythm: Normal rate and regular rhythm.      Pulses: Normal pulses.      Heart sounds: Normal heart sounds.   Pulmonary:      Effort: Pulmonary effort is normal.      Breath sounds: Normal breath sounds.   Abdominal:      General: Abdomen is flat.      Palpations: Abdomen is soft.   Musculoskeletal:         General: Tenderness (R sholder) present. Normal range of motion.      Right shoulder: Normal range of motion.      Cervical back: Normal range of motion and neck supple.   Skin:     General: Skin is warm and dry.   Neurological:      Mental Status: He is alert and oriented to person, place, and time. Mental status is at baseline.   Psychiatric:         Mood and Affect: Mood normal.         Behavior: Behavior normal.       Vitals:    06/23/25 1527   BP: 122/68   Pulse: 80   Resp: 16   Temp: 97.8 °F (36.6 °C)   TempSrc: Oral   SpO2: 97%   Weight: 74.3 kg (163 lb 14.6 oz)   Height: 5' 9" (1.753 m)      Body mass index is 24.21 kg/m².             Assessment & Plan      1. Chronic right shoulder pain  - X-ray Shoulder 2 or More Views Right; Future    2. Actinic keratosis  - Ambulatory referral/consult to Dermatology; Future       Assessment & Plan    IMPRESSION:  - Assessed right shoulder pain, present for approximately 30 days.  - Considered possible etiologies including strain, arthritis, or rotator cuff tear.  - Evaluated skin lesions on leg, likely benign based on lack of growth, pain, or color changes.    RIGHT SHOULDER PAIN:  - Patient reports right shoulder pain for about a month, likely from strain during activities like push-ups.  - Pain is localized to the shoulder without radiation.  - Examination revealed discomfort but not severe soreness.  - Potential causes include arthritis or rotator cuff tear.  - Prescribed Tylenol " (acetaminophen) up to 3,000 mg daily with ibuprofen as needed, though advised limiting ibuprofen use.  - Acetaminophen is recommended over ibuprofen for older adults due to reduced nephrotoxicity and gastric irritation risks.  - Ordered right shoulder radiograph to evaluate for arthritic changes; if normal with persistent symptoms, will consider MRI for soft tissue assessment.    SKIN LESIONS AND SUN PROTECTION:  - Patient advised to use sunscreen with SPF greater than 30, wear hats, and long sleeves for sun protection.  - Referred to Dr. Mary Fernando at [facility] for evaluation and potential treatment of skin lesions.           --------------------------------------------      Health Maintenance:  Health Maintenance         Date Due Completion Date    Abdominal Aortic Aneurysm Screening Never done ---    Foot Exam 12/19/2024 12/19/2023 (Done)    Override on 12/19/2023: Done    Diabetic Eye Exam 04/05/2025 4/5/2024    Hemoglobin A1c 10/21/2025 4/21/2025    Diabetes Urine Screening 11/06/2025 11/6/2024    PROSTATE-SPECIFIC ANTIGEN 04/21/2026 4/21/2025    High Dose Statin 06/23/2026 6/23/2025    TETANUS VACCINE 02/01/2028 2/1/2018    Colorectal Cancer Screening 06/16/2029 6/16/2022            Health maintenance reviewed    Follow Up:  No follow-ups on file.       - The patient is given an After Visit Summary that lists all medications with directions, allergies, education, orders placed during this encounter and follow-up instructions.      - I have reviewed the patient's medical information including past medical, family, and social history sections including the medications and allergies.      - We discussed the patient's current medications.     This note was generated with the assistance of ambient listening technology. Verbal consent was obtained by the patient and accompanying visitor(s) for the recording of patient appointment to facilitate this note. I attest to having reviewed and edited the  generated note for accuracy, though some syntax or spelling errors may persist. Please contact the author of this note for any clarification.      This note was created by combination of typed  and MModal dictation.  Transcription errors may be present.  If there are any questions, please contact me.     Edda Red PA-C  [facility]               [1]   Social History  Socioeconomic History    Marital status:    Tobacco Use    Smoking status: Former     Current packs/day: 0.00     Average packs/day: 1 pack/day for 10.0 years (10.0 ttl pk-yrs)     Types: Cigarettes     Start date: 1979     Quit date: 1989     Years since quittin.9    Smokeless tobacco: Never    Tobacco comments:     Patient Quit Smoking on 1989.   Substance and Sexual Activity    Alcohol use: No     Comment: RARELY    Drug use: Not Currently     Social Drivers of Health     Financial Resource Strain: Low Risk  (2025)    Overall Financial Resource Strain (CARDIA)     Difficulty of Paying Living Expenses: Not hard at all   Food Insecurity: No Food Insecurity (2025)    Hunger Vital Sign     Worried About Running Out of Food in the Last Year: Never true     Ran Out of Food in the Last Year: Never true   Transportation Needs: No Transportation Needs (2025)    PRAPARE - Transportation     Lack of Transportation (Medical): No     Lack of Transportation (Non-Medical): No   Physical Activity: Insufficiently Active (2025)    Exercise Vital Sign     Days of Exercise per Week: 3 days     Minutes of Exercise per Session: 40 min   Stress: No Stress Concern Present (2025)    [employer] [place] - Occupational Stress Questionnaire     Feeling of Stress : Not at all   Housing Stability: Low Risk  (2025)    Housing Stability Vital Sign     Unable to Pay for Housing in the Last Year: No     Homeless in the Last Year: No   [2]   Current Outpatient  Medications   Medication Sig Dispense Refill    ascorbic acid, vitamin C, (VITAMIN C) 1000 MG tablet Take 1,000 mg by mouth.      cyanocobalamin (VITAMIN B-12) 1000 MCG tablet Take 100 mcg by mouth.      famotidine (PEPCID) 20 MG tablet Take 1 tablet (20 mg total) by mouth 2 (two) times daily as needed for Heartburn. 180 tablet 1    ferrous gluconate (FERGON) 324 MG tablet Take 324 mg by mouth daily with breakfast.      gabapentin (NEURONTIN) 300 MG capsule Take 1 capsule (300 mg total) by mouth 3 (three) times daily. 90 capsule 11    metFORMIN (GLUCOPHAGE-XR) 750 MG ER 24hr tablet 2 pills in the  tablet 1    multivitamin (ONE DAILY MULTIVITAMIN) per tablet Take 1 tablet by mouth once daily.      rosuvastatin (CRESTOR) 20 MG tablet Take 1 tablet (20 mg total) by mouth once daily. 90 tablet 2    valsartan (DIOVAN) 320 MG tablet Take 1 tablet (320 mg total) by mouth once daily. 90 tablet 3    vitamin A 8000 UNIT capsule Take 8,000 Units by mouth once daily.      vitamin D (VITAMIN D3) 1000 units Tab Take 1,000 Units by mouth.      ibuprofen (ADVIL,MOTRIN) 200 MG tablet Take 200 mg by mouth every 6 (six) hours as needed for Pain.       No current facility-administered medications for this visit.     Facility-Administered Medications Ordered in Other Visits   Medication Dose Route Frequency Provider Last Rate Last Admin    0.9%  NaCl infusion  500 mL Intravenous Continuous Gee Jj MD

## 2025-06-25 ENCOUNTER — PATIENT MESSAGE (OUTPATIENT)
Dept: FAMILY MEDICINE | Facility: CLINIC | Age: 70
End: 2025-06-25
Payer: MEDICARE

## 2025-06-25 ENCOUNTER — RESULTS FOLLOW-UP (OUTPATIENT)
Dept: FAMILY MEDICINE | Facility: CLINIC | Age: 70
End: 2025-06-25

## 2025-06-25 DIAGNOSIS — M25.511 CHRONIC RIGHT SHOULDER PAIN: Primary | ICD-10-CM

## 2025-06-25 DIAGNOSIS — G89.29 CHRONIC RIGHT SHOULDER PAIN: Primary | ICD-10-CM

## 2025-07-09 NOTE — PROGRESS NOTES
Assessment: 70 y.o. male with right cuff tendinitis, biceps tendinitis     I explained my diagnostic impression and the reasoning behind it in detail, using layman's terms.      Plan:   - Hernando patiño   - Mobic 15 mg PO QD x 2 weeks then PRN. The patient was advised that NSAID-type medications have important potential side effects: gastrointestinal irritation, GI bleeding, cardiac effects and renal injuries. Take the medication with food and to stop and call the office for any GI upset, vomiting, abdominal pain or black/bloody stools. The patient expresses understanding of these issues and questions were answered.  - Return to clinic in 12 weeks. Return sooner if symptoms worsen or fail to improve.    All questions were answered in detail. The patient is in full agreement with the treatment plan and will proceed accordingly.    No chief complaint on file.    Initial visit (7/14/25): Dariusz Urbina is a 70 y.o. male who presents today complaining of right shoulder pain     Dariusz presents with left shoulder pain ongoing for approximately 45 days. Pain is localized to the side of the shoulder, with some tender areas that are difficult to pinpoint precisely.  He denies any specific injury or inciting event.    Pain worsens with reaching overhead, causing pain on the side and front of the shoulder. He also notes discomfort when lying on the affected side or sleeping with the arm in certain positions. This pain is disruptive to sleep. He previously did pushups regularly but now finds this activity painful. 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    For pain management, he has been taking gabapentin and Advil periodically, which provides temporary relief but has not led to overall improvement. He reports no formal therapy for this condition.    He denies any numbness or tingling down the arm or pain when reaching behind the back.    WORK STATUS:  Dariusz works in real estate.  There is no mention of physical activities or limitations at work related to his shoulder pain.        This patient was seen in consultation at the request of Edda Red    This is the extent of the patient's complaints at this time.     Hand dominance: Right        Review of patient's allergies indicates:  No Known Allergies      Physical Exam:   There were no vitals filed for this visit.    General:   There is no height or weight on file to calculate BMI.  Patient is alert, awake and oriented to time, place and person. Mood and affect are appropriate.  Patient does not appear to be in any distress, denies any constitutional symptoms and appears stated age.   HEENT: Pupils are equal and round, sclera are not injected. External examination of ears and nose reveals no abnormalities. Cranial nerves II-X are grossly intact  Skin: no rashes, abrasions or open wounds on the affected extremity   Resp: No respiratory distress or audible wheezing   CV: 2+  pulses, all extremities warm and well perfused   Right Shoulder    Shoulder Range of Motion    Right     Left   (Active/Passive)       Forward Elevation     170/175            170/175  External rotation (arm at side)  45/45             45/45   Internal rotation behind the back  L5             L5     Range of motion is mildly painful      Acromioclavicular joint is not tender  Crossbody test: negative    Neer's positive  Hawkin's positive    Jude's positive  Drop arm negative  Belly press negative       Cuff Strength     Right     Left   Supraspinatus        5/5    5/5  Infraspinatus     5/5    5/5  Subscapularis     5/5    5/5    Deltoid testing            5/5    5/5    Speeds positive  Yergasons positive       Elbow examination demonstrates no tenderness to palpation and has normal range of motion.     ltsi C5-T1  + epl, io, fds, fdp   2+ RP      Imaging:  3 views of the right shoulder:  positive for degenerative changes of the AC joint. The humeral head is well  centered on the AP and axillary views.  No cortical changes of the greater tuberosity noted. Multiple calcifications remote from cuff insertion - I do not agree that this represents calcific tendinitis of the shoulder. There is not significant degenerative change of the glenohumeral joint or posterior subluxation of the humeral head. No acute changes or fracture.      I personally reviewed and interpreted the patient's imaging obtained prior to visit      A note notifying Edda Red of my findings was sent via the electronic medical record     This note was created by combination of typed  and M-Modal dictation. Transcription and phonetic errors may be present.  If there are any questions, please contact me.    Current Medications[1]    Past Medical History:   Diagnosis Date    Controlled type 2 diabetes mellitus without complication, without long-term current use of insulin 2/8/2023    Decreased libido 11/11/2013    Dermatitis 10/12/2012    Hyperlipidemia 11/11/2013    Hypertension        Active Problem List with Overview Notes    Diagnosis Date Noted    Type 2 diabetes mellitus with diabetic cataract, without long-term current use of insulin 04/21/2025    Refractive error 04/05/2024    Nuclear sclerosis of both eyes 04/05/2024    Controlled type 2 diabetes mellitus without complication, without long-term current use of insulin 02/08/2023    Atherosclerosis of aorta 05/10/2022    Low back strain, initial encounter 05/10/2022    Status post right hip replacement 12/07/2020    Abnormal CT of the chest 03/06/2020     Actinomycosis s/p prolonged antibioitcs.  Ct with significant improvement of aeration when compared to 2017.  However, lll stranding is new when compared to 2018 ct.  Suspect scarring process.  pft with improved fvc and fev1 c/w radiographic improvement.        Decreased range of motion of right lower extremity 11/20/2018    Pain in right hip 11/20/2018    Decreased strength 11/20/2018     "Impaired motor control 11/20/2018    Actinomycosis due to Actinomyces odontolyticus 09/15/2017    Fungal infection of lung 09/15/2017    Lung nodules 07/19/2017    Lung mass 06/16/2017     Presented on Chika 10, 2017 with 2 weeks of fatigue, productive cough, shortness of breath on exertion, and fever.  X-ray on Chkia 10, 2017 showed patchy airspace disease with multiple rounded opacities.  CT on June 12 showed numerous opacities throughout the lungs bilaterally.  The largest measured 4 cm in the right lower lobe with one in the right upper lobe measuring 3.1 cm and one in the right middle lobe measuring 3.4 cm.  There was an area of consolidation measuring 9 cm in the left lower lobe.      CT-guided biopsy of the lung was performed on June 16. No cultures were done.   Biopsy from FNA on June 16 showed fibrosis with granuloma and focal necrosis.  No malignancy was seen. Special stains were positive for fungal yeast forms. PCR for fungal RNA was negative, however, the report states "formalin dramatically reduces the sensitivity of the assays due to reduced template yield in quality."     PET scan on June 19 showed "innumerable hypermetabolic masses."    Cryptococcal antigen on June 21 was negative.  Repeat on July 11 also negative.      On July 19 he had bronchoscopy with LLL BAL and right upper lobe wedge resection via VATS.     BAL from the left lower lobe showed no malignant cells and a few inflammatory cells.    Pathology from the right upper lobe wedge resection showed formed, noncaseating, giant cell granulomata.  No fungal elements on GMS stain and no filamentous organisms on PASD stain seen.  No neoplasm or AFB were detected.    Cultures on July 19 from the left lower lobe were negative for aerobes but positive for Actinomyces odontolyticus in the anaerobic culture.  No AFB or fungus was seen or grown.  The anaerobic culture in the right upper lobe grew Finegoldia magna, and cultures for aerobes, AFB, fungi " were negative.    Treatment included fluconazole started in late June at 800 mg daily and IV antibiotics starting on 8/8. PICC line was placed on August 8. He is on this therapy until 9/20.      Decreased libido 11/11/2013    Screening for prostate cancer 11/11/2013    Hyperlipidemia 11/11/2013    Backache 10/12/2012    Knee pain 10/12/2012    Dermatitis 10/12/2012       Past Surgical History:   Procedure Laterality Date    COLONOSCOPY      COLONOSCOPY N/A 6/16/2022    Procedure: COLONOSCOPY;  Surgeon: Omaira Mcclelland MD;  Location: [facility] ENDO;  Service: Endoscopy;  Laterality: N/A;  fully vaccinated  instructions emailed   [facility] provider from Dr. Alvarez to Dr. Mcclelland---    HIP REPLACEMENT ARTHROPLASTY  03/2019    LUMBAR FUSION      titanium rods    TONSILLECTOMY      TRANSFORAMINAL EPIDURAL INJECTION OF STEROID Left 7/1/2022    Procedure: TFESI Left  L3-4;  Surgeon: Mario Lamb DO;  Location: [facility] OR;  Service: Pain Management;  Laterality: Left;    TRANSFORAMINAL EPIDURAL INJECTION OF STEROID Left 7/29/2022    Procedure: INJECTION, STEROID, EPIDURAL, TRANSFORAMINAL APPROACH, LEFT L3-L4 CONTRAST DIRECT REF;  Surgeon: Jaspreet Amos MD;  Location: [facility] PAIN [facility]GT;  Service: Pain Management;  Laterality: Left;       Social History[2]                [1]   Current Outpatient Medications:     ascorbic acid, vitamin C, (VITAMIN C) 1000 MG tablet, Take 1,000 mg by mouth., Disp: , Rfl:     cyanocobalamin (VITAMIN B-12) 1000 MCG tablet, Take 100 mcg by mouth., Disp: , Rfl:     famotidine (PEPCID) 20 MG tablet, Take 1 tablet (20 mg total) by mouth 2 (two) times daily as needed for Heartburn., Disp: 180 tablet, Rfl: 1    ferrous gluconate (FERGON) 324 MG tablet, Take 324 mg by mouth daily with breakfast., Disp: , Rfl:     gabapentin (NEURONTIN) 300 MG capsule, Take 1 capsule (300 mg total) by mouth 3 (three) times daily., Disp: 90 capsule, Rfl: 11    metFORMIN (GLUCOPHAGE-XR) 750 MG ER 24hr tablet, 2 pills in the  AM, Disp: 180 tablet, Rfl: 1    multivitamin (ONE DAILY MULTIVITAMIN) per tablet, Take 1 tablet by mouth once daily., Disp: , Rfl:     rosuvastatin (CRESTOR) 20 MG tablet, Take 1 tablet (20 mg total) by mouth once daily., Disp: 90 tablet, Rfl: 2    valsartan (DIOVAN) 320 MG tablet, Take 1 tablet (320 mg total) by mouth once daily., Disp: 90 tablet, Rfl: 3    vitamin A 8000 UNIT capsule, Take 8,000 Units by mouth once daily., Disp: , Rfl:     vitamin D (VITAMIN D3) 1000 units Tab, Take 1,000 Units by mouth., Disp: , Rfl:   No current facility-administered medications for this visit.    Facility-Administered Medications Ordered in Other Visits:     0.9%  NaCl infusion, 500 mL, Intravenous, Continuous, Gee Jj MD  [2]   Social History  Socioeconomic History    Marital status:    Tobacco Use    Smoking status: Former     Current packs/day: 0.00     Average packs/day: 1 pack/day for 10.0 years (10.0 ttl pk-yrs)     Types: Cigarettes     Start date: 1979     Quit date: 1989     Years since quittin.0    Smokeless tobacco: Never    Tobacco comments:     Patient Quit Smoking on 1989.   Substance and Sexual Activity    Alcohol use: No     Comment: RARELY    Drug use: Not Currently     Social Drivers of Health     Financial Resource Strain: Low Risk  (2025)    Overall Financial Resource Strain (CARDIA)     Difficulty of Paying Living Expenses: Not hard at all   Food Insecurity: No Food Insecurity (2025)    Hunger Vital Sign     Worried About Running Out of Food in the Last Year: Never true     Ran Out of Food in the Last Year: Never true   Transportation Needs: No Transportation Needs (2025)    PRAPARE - Transportation     Lack of Transportation (Medical): No     Lack of Transportation (Non-Medical): No   Physical Activity: Insufficiently Active (2025)    Exercise Vital Sign     Days of Exercise per Week: 3 days     Minutes of Exercise per Session: 40 min   Stress: No  Stress Concern Present (6/22/2025)    [employer] [place] [employer] - Occupational Stress Questionnaire     Feeling of Stress : Not at all   Housing Stability: Low Risk  (6/22/2025)    Housing Stability Vital Sign     Unable to Pay for Housing in the Last Year: No     Homeless in the Last Year: No

## 2025-07-14 ENCOUNTER — OFFICE VISIT (OUTPATIENT)
Dept: ORTHOPEDICS | Facility: CLINIC | Age: 70
End: 2025-07-14
Payer: MEDICARE

## 2025-07-14 DIAGNOSIS — M25.511 CHRONIC RIGHT SHOULDER PAIN: ICD-10-CM

## 2025-07-14 DIAGNOSIS — G89.29 CHRONIC RIGHT SHOULDER PAIN: ICD-10-CM

## 2025-07-14 PROCEDURE — 3072F LOW RISK FOR RETINOPATHY: CPT | Mod: CPTII,HCNC,S$GLB, | Performed by: ORTHOPAEDIC SURGERY

## 2025-07-14 PROCEDURE — 99999 PR PBB SHADOW E&M-EST. PATIENT-LVL III: CPT | Mod: PBBFAC,HCNC,, | Performed by: ORTHOPAEDIC SURGERY

## 2025-07-14 PROCEDURE — 1160F RVW MEDS BY RX/DR IN RCRD: CPT | Mod: CPTII,HCNC,S$GLB, | Performed by: ORTHOPAEDIC SURGERY

## 2025-07-14 PROCEDURE — 4010F ACE/ARB THERAPY RXD/TAKEN: CPT | Mod: CPTII,HCNC,S$GLB, | Performed by: ORTHOPAEDIC SURGERY

## 2025-07-14 PROCEDURE — 1159F MED LIST DOCD IN RCRD: CPT | Mod: CPTII,HCNC,S$GLB, | Performed by: ORTHOPAEDIC SURGERY

## 2025-07-14 PROCEDURE — 1126F AMNT PAIN NOTED NONE PRSNT: CPT | Mod: CPTII,HCNC,S$GLB, | Performed by: ORTHOPAEDIC SURGERY

## 2025-07-14 PROCEDURE — 3051F HG A1C>EQUAL 7.0%<8.0%: CPT | Mod: CPTII,HCNC,S$GLB, | Performed by: ORTHOPAEDIC SURGERY

## 2025-07-14 PROCEDURE — 99204 OFFICE O/P NEW MOD 45 MIN: CPT | Mod: HCNC,S$GLB,, | Performed by: ORTHOPAEDIC SURGERY

## 2025-07-14 PROCEDURE — 1101F PT FALLS ASSESS-DOCD LE1/YR: CPT | Mod: CPTII,HCNC,S$GLB, | Performed by: ORTHOPAEDIC SURGERY

## 2025-07-14 PROCEDURE — 3288F FALL RISK ASSESSMENT DOCD: CPT | Mod: CPTII,HCNC,S$GLB, | Performed by: ORTHOPAEDIC SURGERY

## 2025-07-14 RX ORDER — MELOXICAM 15 MG/1
15 TABLET ORAL DAILY
Qty: 30 TABLET | Refills: 0 | Status: SHIPPED | OUTPATIENT
Start: 2025-07-14

## 2025-07-23 ENCOUNTER — LAB VISIT (OUTPATIENT)
Dept: LAB | Facility: HOSPITAL | Age: 70
End: 2025-07-23
Attending: INTERNAL MEDICINE
Payer: MEDICARE

## 2025-07-23 DIAGNOSIS — E11.65 UNCONTROLLED TYPE 2 DIABETES MELLITUS WITH HYPERGLYCEMIA: ICD-10-CM

## 2025-07-23 LAB
EAG (OHS): 151 MG/DL (ref 68–131)
HBA1C MFR BLD: 6.9 % (ref 4–5.6)

## 2025-07-23 PROCEDURE — 36415 COLL VENOUS BLD VENIPUNCTURE: CPT | Mod: HCNC,PO

## 2025-07-23 PROCEDURE — 83036 HEMOGLOBIN GLYCOSYLATED A1C: CPT | Mod: HCNC

## (undated) DEVICE — SOL CLEARIFY VISUALIZATION LAP

## (undated) DEVICE — RELOAD ENDO GIA TRISTAPLE 45MM

## (undated) DEVICE — CATH THORACIC 24FR ST

## (undated) DEVICE — SEE MEDLINE ITEM 146292

## (undated) DEVICE — TOWEL OR DISP STRL BLUE 4/PK

## (undated) DEVICE — DRAPE INCISE IOBAN 2 23X17IN

## (undated) DEVICE — TRAY MINOR GEN SURG

## (undated) DEVICE — MARKER SKIN STND TIP BLUE BARR

## (undated) DEVICE — DRESSING TELFA STRL 4X3 LF

## (undated) DEVICE — SPONGE TONSIL LARGE

## (undated) DEVICE — FORCEP JAW RADIAL PULM 2X100CM

## (undated) DEVICE — KIT ANTIFOG

## (undated) DEVICE — ENDO GIA UNIVERSAL 12MM

## (undated) DEVICE — SUT MONOCYRL 4-0 PS2 UND

## (undated) DEVICE — ADAPTER SWIVEL

## (undated) DEVICE — SYR SLIP TIP 20CC

## (undated) DEVICE — SEE MEDLINE ITEM 146416

## (undated) DEVICE — GLOVE BIOGEL SKINSENSE PI 7.5

## (undated) DEVICE — DRAIN CHEST DRY SUCTION

## (undated) DEVICE — TAPE MEDIPORE 4IN X 2YDS

## (undated) DEVICE — CLOSURE SKIN STERI STRIP 1/4X4

## (undated) DEVICE — SYS LABEL CORRECT MED

## (undated) DEVICE — WARMER DRAPE STERILE LF

## (undated) DEVICE — SEE MEDLINE ITEM 157117

## (undated) DEVICE — SEE MEDLINE ITEM 157131

## (undated) DEVICE — ELECTRODE REM PLYHSV RETURN 9

## (undated) DEVICE — DRAIN CHAN RND HUBLS 8MM 24FR

## (undated) DEVICE — SYR 60CC W/GAUGE

## (undated) DEVICE — SUT ETHILON 2-0 BLK PS-2

## (undated) DEVICE — SEE MEDLINE ITEM 152487

## (undated) DEVICE — GAUZE SPONGE 4X4 12PLY

## (undated) DEVICE — ELECTRODE BLADE INSULATED 1 IN

## (undated) DEVICE — PACK DRAPE UNIVERSAL CONVERTOR

## (undated) DEVICE — SEE MEDLINE ITEM 146313

## (undated) DEVICE — SEE MEDLINE ITEM 152622

## (undated) DEVICE — BLADE ELECTRO EDGE INSULATED

## (undated) DEVICE — SUT 2-0 VICRYL / CT-1

## (undated) DEVICE — WIRE JAGWIRE 35G PULMONARY

## (undated) DEVICE — SUT CTD VICRYL 0 UND BR CT

## (undated) DEVICE — TAPE SILK 3IN

## (undated) DEVICE — DRESSING TELFA PAD N ADH 2X3

## (undated) DEVICE — CONTAINER SPECIMEN STRL 4OZ

## (undated) DEVICE — DRESSING TRANS 4X4 TEGADERM

## (undated) DEVICE — KIT NERVE BLOCK PREP BAPTIST

## (undated) DEVICE — BAG TISS RETRV MONARCH 10MM

## (undated) DEVICE — SEE MEDLINE ITEM 146420

## (undated) DEVICE — PACK SET UP CONVERTORS